# Patient Record
Sex: MALE | Race: WHITE | Employment: OTHER | URBAN - METROPOLITAN AREA
[De-identification: names, ages, dates, MRNs, and addresses within clinical notes are randomized per-mention and may not be internally consistent; named-entity substitution may affect disease eponyms.]

---

## 2023-05-14 ENCOUNTER — APPOINTMENT (OUTPATIENT)
Dept: CT IMAGING | Age: 73
DRG: 068 | End: 2023-05-14
Payer: MEDICARE

## 2023-05-14 ENCOUNTER — HOSPITAL ENCOUNTER (INPATIENT)
Age: 73
LOS: 2 days | Discharge: HOME | DRG: 068 | End: 2023-05-16
Attending: EMERGENCY MEDICINE | Admitting: INTERNAL MEDICINE
Payer: MEDICARE

## 2023-05-14 ENCOUNTER — APPOINTMENT (OUTPATIENT)
Dept: GENERAL RADIOLOGY | Age: 73
DRG: 068 | End: 2023-05-14
Payer: MEDICARE

## 2023-05-14 DIAGNOSIS — I48.91 NEW ONSET ATRIAL FIBRILLATION (HCC): ICD-10-CM

## 2023-05-14 DIAGNOSIS — I63.9 CEREBROVASCULAR ACCIDENT (CVA), UNSPECIFIED MECHANISM (HCC): Primary | ICD-10-CM

## 2023-05-14 DIAGNOSIS — I48.91 ATRIAL FIBRILLATION WITH RVR (HCC): ICD-10-CM

## 2023-05-14 DIAGNOSIS — D69.6 THROMBOCYTOPENIA (HCC): ICD-10-CM

## 2023-05-14 PROBLEM — R03.0 ELEVATED BLOOD PRESSURE READING: Status: ACTIVE | Noted: 2023-05-14

## 2023-05-14 PROBLEM — R47.01 EXPRESSIVE APHASIA: Status: ACTIVE | Noted: 2023-05-14

## 2023-05-14 PROBLEM — R20.0 LEFT ARM NUMBNESS: Status: ACTIVE | Noted: 2023-05-14

## 2023-05-14 PROBLEM — R29.810 FACIAL DROOP: Status: ACTIVE | Noted: 2023-05-14

## 2023-05-14 PROBLEM — R29.90 STROKE-LIKE SYMPTOMS: Status: ACTIVE | Noted: 2023-05-14

## 2023-05-14 LAB
ALBUMIN SERPL-MCNC: 4.5 G/DL (ref 3.4–5)
ALBUMIN/GLOB SERPL: 1.4 {RATIO} (ref 1.1–2.2)
ALP SERPL-CCNC: 238 U/L (ref 40–129)
ALT SERPL-CCNC: 39 U/L (ref 10–40)
ANION GAP SERPL CALCULATED.3IONS-SCNC: 16 MMOL/L (ref 3–16)
AST SERPL-CCNC: 75 U/L (ref 15–37)
BASOPHILS # BLD: 0 K/UL (ref 0–0.2)
BASOPHILS NFR BLD: 0.4 %
BILIRUB SERPL-MCNC: 1.6 MG/DL (ref 0–1)
BUN SERPL-MCNC: 9 MG/DL (ref 7–20)
CALCIUM SERPL-MCNC: 9.2 MG/DL (ref 8.3–10.6)
CHLORIDE SERPL-SCNC: 97 MMOL/L (ref 99–110)
CO2 SERPL-SCNC: 24 MMOL/L (ref 21–32)
CREAT SERPL-MCNC: 0.7 MG/DL (ref 0.8–1.3)
DEPRECATED RDW RBC AUTO: 13.6 % (ref 12.4–15.4)
EOSINOPHIL # BLD: 0.2 K/UL (ref 0–0.6)
EOSINOPHIL NFR BLD: 1.9 %
GFR SERPLBLD CREATININE-BSD FMLA CKD-EPI: >60 ML/MIN/{1.73_M2}
GLUCOSE BLD-MCNC: 100 MG/DL
GLUCOSE BLD-MCNC: 100 MG/DL (ref 70–99)
GLUCOSE SERPL-MCNC: 114 MG/DL (ref 70–99)
HCT VFR BLD AUTO: 49.9 % (ref 40.5–52.5)
HGB BLD-MCNC: 17.2 G/DL (ref 13.5–17.5)
INR PPP: 1.27 (ref 0.84–1.16)
LYMPHOCYTES # BLD: 1.7 K/UL (ref 1–5.1)
LYMPHOCYTES NFR BLD: 18.9 %
MCH RBC QN AUTO: 36.5 PG (ref 26–34)
MCHC RBC AUTO-ENTMCNC: 34.5 G/DL (ref 31–36)
MCV RBC AUTO: 105.7 FL (ref 80–100)
MONOCYTES # BLD: 0.9 K/UL (ref 0–1.3)
MONOCYTES NFR BLD: 10.5 %
NEUTROPHILS # BLD: 6.1 K/UL (ref 1.7–7.7)
NEUTROPHILS NFR BLD: 68.3 %
PERFORMED ON: ABNORMAL
PLATELET # BLD AUTO: 116 K/UL (ref 135–450)
PMV BLD AUTO: 8.1 FL (ref 5–10.5)
POTASSIUM SERPL-SCNC: 3.9 MMOL/L (ref 3.5–5.1)
PROT SERPL-MCNC: 7.7 G/DL (ref 6.4–8.2)
PROTHROMBIN TIME: 15.8 SEC (ref 11.5–14.8)
RBC # BLD AUTO: 4.72 M/UL (ref 4.2–5.9)
SODIUM SERPL-SCNC: 137 MMOL/L (ref 136–145)
TROPONIN, HIGH SENSITIVITY: 13 NG/L (ref 0–22)
WBC # BLD AUTO: 8.9 K/UL (ref 4–11)

## 2023-05-14 PROCEDURE — 6370000000 HC RX 637 (ALT 250 FOR IP): Performed by: INTERNAL MEDICINE

## 2023-05-14 PROCEDURE — 85025 COMPLETE CBC W/AUTO DIFF WBC: CPT

## 2023-05-14 PROCEDURE — 80053 COMPREHEN METABOLIC PANEL: CPT

## 2023-05-14 PROCEDURE — 85610 PROTHROMBIN TIME: CPT

## 2023-05-14 PROCEDURE — 70496 CT ANGIOGRAPHY HEAD: CPT

## 2023-05-14 PROCEDURE — 70450 CT HEAD/BRAIN W/O DYE: CPT

## 2023-05-14 PROCEDURE — 99285 EMERGENCY DEPT VISIT HI MDM: CPT

## 2023-05-14 PROCEDURE — 93005 ELECTROCARDIOGRAM TRACING: CPT | Performed by: EMERGENCY MEDICINE

## 2023-05-14 PROCEDURE — 2060000000 HC ICU INTERMEDIATE R&B

## 2023-05-14 PROCEDURE — 84484 ASSAY OF TROPONIN QUANT: CPT

## 2023-05-14 PROCEDURE — 71045 X-RAY EXAM CHEST 1 VIEW: CPT

## 2023-05-14 PROCEDURE — 6360000004 HC RX CONTRAST MEDICATION: Performed by: EMERGENCY MEDICINE

## 2023-05-14 RX ORDER — ACETAMINOPHEN 325 MG/1
650 TABLET ORAL EVERY 4 HOURS PRN
Status: DISCONTINUED | OUTPATIENT
Start: 2023-05-14 | End: 2023-05-16 | Stop reason: HOSPADM

## 2023-05-14 RX ORDER — DILTIAZEM HYDROCHLORIDE 60 MG/1
60 TABLET, FILM COATED ORAL EVERY 6 HOURS SCHEDULED
Status: DISCONTINUED | OUTPATIENT
Start: 2023-05-14 | End: 2023-05-15

## 2023-05-14 RX ORDER — ATORVASTATIN CALCIUM 80 MG/1
80 TABLET, FILM COATED ORAL NIGHTLY
Status: DISCONTINUED | OUTPATIENT
Start: 2023-05-14 | End: 2023-05-16 | Stop reason: HOSPADM

## 2023-05-14 RX ORDER — ASPIRIN 300 MG/1
300 SUPPOSITORY RECTAL DAILY
Status: DISCONTINUED | OUTPATIENT
Start: 2023-05-15 | End: 2023-05-16 | Stop reason: HOSPADM

## 2023-05-14 RX ORDER — ONDANSETRON 2 MG/ML
4 INJECTION INTRAMUSCULAR; INTRAVENOUS EVERY 6 HOURS PRN
Status: DISCONTINUED | OUTPATIENT
Start: 2023-05-14 | End: 2023-05-16 | Stop reason: HOSPADM

## 2023-05-14 RX ORDER — M-VIT,TX,IRON,MINS/CALC/FOLIC 27MG-0.4MG
1 TABLET ORAL DAILY
COMMUNITY

## 2023-05-14 RX ORDER — POLYETHYLENE GLYCOL 3350 17 G/17G
17 POWDER, FOR SOLUTION ORAL DAILY PRN
Status: DISCONTINUED | OUTPATIENT
Start: 2023-05-14 | End: 2023-05-16 | Stop reason: HOSPADM

## 2023-05-14 RX ORDER — ENOXAPARIN SODIUM 100 MG/ML
40 INJECTION SUBCUTANEOUS DAILY
Status: CANCELLED | OUTPATIENT
Start: 2023-05-14

## 2023-05-14 RX ORDER — ASPIRIN 81 MG/1
81 TABLET ORAL DAILY
Status: DISCONTINUED | OUTPATIENT
Start: 2023-05-15 | End: 2023-05-16 | Stop reason: HOSPADM

## 2023-05-14 RX ORDER — ASPIRIN 81 MG/1
81 TABLET ORAL DAILY
COMMUNITY

## 2023-05-14 RX ORDER — ONDANSETRON 4 MG/1
4 TABLET, ORALLY DISINTEGRATING ORAL EVERY 8 HOURS PRN
Status: DISCONTINUED | OUTPATIENT
Start: 2023-05-14 | End: 2023-05-16 | Stop reason: HOSPADM

## 2023-05-14 RX ORDER — IBUPROFEN 200 MG
200 TABLET ORAL EVERY 6 HOURS PRN
Status: ON HOLD | COMMUNITY
End: 2023-05-15 | Stop reason: HOSPADM

## 2023-05-14 RX ADMIN — APIXABAN 5 MG: 5 TABLET, FILM COATED ORAL at 20:55

## 2023-05-14 RX ADMIN — ATORVASTATIN CALCIUM 80 MG: 80 TABLET, FILM COATED ORAL at 22:25

## 2023-05-14 RX ADMIN — DILTIAZEM HYDROCHLORIDE 60 MG: 60 TABLET, FILM COATED ORAL at 20:38

## 2023-05-14 RX ADMIN — IOPAMIDOL 75 ML: 755 INJECTION, SOLUTION INTRAVENOUS at 18:58

## 2023-05-14 RX ADMIN — DILTIAZEM HYDROCHLORIDE 60 MG: 60 TABLET, FILM COATED ORAL at 22:25

## 2023-05-14 ASSESSMENT — ENCOUNTER SYMPTOMS
RHINORRHEA: 0
SORE THROAT: 0
ABDOMINAL PAIN: 0
EYE REDNESS: 0
SHORTNESS OF BREATH: 0

## 2023-05-14 ASSESSMENT — PAIN SCALES - GENERAL: PAINLEVEL_OUTOF10: 2

## 2023-05-15 ENCOUNTER — TELEPHONE (OUTPATIENT)
Dept: CARDIOLOGY | Age: 73
End: 2023-05-15

## 2023-05-15 ENCOUNTER — APPOINTMENT (OUTPATIENT)
Dept: MRI IMAGING | Age: 73
DRG: 068 | End: 2023-05-15
Payer: MEDICARE

## 2023-05-15 PROBLEM — I63.9 CEREBROVASCULAR ACCIDENT (CVA) (HCC): Status: ACTIVE | Noted: 2023-05-15

## 2023-05-15 LAB
ANION GAP SERPL CALCULATED.3IONS-SCNC: 13 MMOL/L (ref 3–16)
BASOPHILS # BLD: 0 K/UL (ref 0–0.2)
BASOPHILS NFR BLD: 0.5 %
BUN SERPL-MCNC: 10 MG/DL (ref 7–20)
CALCIUM SERPL-MCNC: 8.8 MG/DL (ref 8.3–10.6)
CHLORIDE SERPL-SCNC: 101 MMOL/L (ref 99–110)
CHOLEST SERPL-MCNC: 176 MG/DL (ref 0–199)
CO2 SERPL-SCNC: 24 MMOL/L (ref 21–32)
CREAT SERPL-MCNC: 0.6 MG/DL (ref 0.8–1.3)
DEPRECATED RDW RBC AUTO: 13.6 % (ref 12.4–15.4)
EKG ATRIAL RATE: 170 BPM
EKG DIAGNOSIS: NORMAL
EKG Q-T INTERVAL: 304 MS
EKG QRS DURATION: 88 MS
EKG QTC CALCULATION (BAZETT): 472 MS
EKG R AXIS: 38 DEGREES
EKG T AXIS: 42 DEGREES
EKG VENTRICULAR RATE: 145 BPM
EOSINOPHIL # BLD: 0.1 K/UL (ref 0–0.6)
EOSINOPHIL NFR BLD: 1.9 %
GFR SERPLBLD CREATININE-BSD FMLA CKD-EPI: >60 ML/MIN/{1.73_M2}
GLUCOSE SERPL-MCNC: 117 MG/DL (ref 70–99)
HCT VFR BLD AUTO: 47.4 % (ref 40.5–52.5)
HDLC SERPL-MCNC: 44 MG/DL (ref 40–60)
HGB BLD-MCNC: 16.5 G/DL (ref 13.5–17.5)
LDLC SERPL CALC-MCNC: 90 MG/DL
LV EF: 58 %
LVEF MODALITY: NORMAL
LYMPHOCYTES # BLD: 1.1 K/UL (ref 1–5.1)
LYMPHOCYTES NFR BLD: 14.3 %
MAGNESIUM SERPL-MCNC: 1.9 MG/DL (ref 1.8–2.4)
MCH RBC QN AUTO: 36.5 PG (ref 26–34)
MCHC RBC AUTO-ENTMCNC: 34.7 G/DL (ref 31–36)
MCV RBC AUTO: 105.1 FL (ref 80–100)
MONOCYTES # BLD: 0.8 K/UL (ref 0–1.3)
MONOCYTES NFR BLD: 10.7 %
NEUTROPHILS # BLD: 5.4 K/UL (ref 1.7–7.7)
NEUTROPHILS NFR BLD: 72.6 %
PLATELET # BLD AUTO: 104 K/UL (ref 135–450)
PMV BLD AUTO: 8.7 FL (ref 5–10.5)
POTASSIUM SERPL-SCNC: 3.5 MMOL/L (ref 3.5–5.1)
RBC # BLD AUTO: 4.52 M/UL (ref 4.2–5.9)
SODIUM SERPL-SCNC: 138 MMOL/L (ref 136–145)
T4 FREE SERPL-MCNC: 1.4 NG/DL (ref 0.9–1.8)
TRIGL SERPL-MCNC: 211 MG/DL (ref 0–150)
TSH SERPL DL<=0.005 MIU/L-ACNC: 3.82 UIU/ML (ref 0.27–4.2)
VLDLC SERPL CALC-MCNC: 42 MG/DL
WBC # BLD AUTO: 7.4 K/UL (ref 4–11)

## 2023-05-15 PROCEDURE — 80048 BASIC METABOLIC PNL TOTAL CA: CPT

## 2023-05-15 PROCEDURE — 80061 LIPID PANEL: CPT

## 2023-05-15 PROCEDURE — 1200000000 HC SEMI PRIVATE

## 2023-05-15 PROCEDURE — 84443 ASSAY THYROID STIM HORMONE: CPT

## 2023-05-15 PROCEDURE — 6370000000 HC RX 637 (ALT 250 FOR IP): Performed by: INTERNAL MEDICINE

## 2023-05-15 PROCEDURE — 85025 COMPLETE CBC W/AUTO DIFF WBC: CPT

## 2023-05-15 PROCEDURE — 99223 1ST HOSP IP/OBS HIGH 75: CPT | Performed by: NURSE PRACTITIONER

## 2023-05-15 PROCEDURE — 70551 MRI BRAIN STEM W/O DYE: CPT

## 2023-05-15 PROCEDURE — 6360000002 HC RX W HCPCS

## 2023-05-15 PROCEDURE — 93010 ELECTROCARDIOGRAM REPORT: CPT | Performed by: INTERNAL MEDICINE

## 2023-05-15 PROCEDURE — 84439 ASSAY OF FREE THYROXINE: CPT

## 2023-05-15 PROCEDURE — 97165 OT EVAL LOW COMPLEX 30 MIN: CPT

## 2023-05-15 PROCEDURE — 93306 TTE W/DOPPLER COMPLETE: CPT

## 2023-05-15 PROCEDURE — 83735 ASSAY OF MAGNESIUM: CPT

## 2023-05-15 PROCEDURE — 83036 HEMOGLOBIN GLYCOSYLATED A1C: CPT

## 2023-05-15 PROCEDURE — 97535 SELF CARE MNGMENT TRAINING: CPT

## 2023-05-15 PROCEDURE — 36415 COLL VENOUS BLD VENIPUNCTURE: CPT

## 2023-05-15 PROCEDURE — 6370000000 HC RX 637 (ALT 250 FOR IP)

## 2023-05-15 PROCEDURE — 97116 GAIT TRAINING THERAPY: CPT

## 2023-05-15 PROCEDURE — 97161 PT EVAL LOW COMPLEX 20 MIN: CPT

## 2023-05-15 RX ORDER — ATORVASTATIN CALCIUM 80 MG/1
80 TABLET, FILM COATED ORAL NIGHTLY
Qty: 30 TABLET | Refills: 0 | Status: SHIPPED | OUTPATIENT
Start: 2023-05-15

## 2023-05-15 RX ORDER — DILTIAZEM HYDROCHLORIDE 180 MG/1
180 CAPSULE, COATED, EXTENDED RELEASE ORAL DAILY
Qty: 30 CAPSULE | Refills: 1 | Status: SHIPPED | OUTPATIENT
Start: 2023-05-15

## 2023-05-15 RX ORDER — DILTIAZEM HYDROCHLORIDE 180 MG/1
180 CAPSULE, COATED, EXTENDED RELEASE ORAL DAILY
Status: DISCONTINUED | OUTPATIENT
Start: 2023-05-15 | End: 2023-05-16 | Stop reason: HOSPADM

## 2023-05-15 RX ORDER — LORAZEPAM 2 MG/ML
0.5 INJECTION INTRAMUSCULAR ONCE
Status: COMPLETED | OUTPATIENT
Start: 2023-05-15 | End: 2023-05-15

## 2023-05-15 RX ADMIN — LORAZEPAM 0.5 MG: 2 INJECTION INTRAMUSCULAR; INTRAVENOUS at 09:05

## 2023-05-15 RX ADMIN — APIXABAN 5 MG: 5 TABLET, FILM COATED ORAL at 08:03

## 2023-05-15 RX ADMIN — DILTIAZEM HYDROCHLORIDE 60 MG: 60 TABLET, FILM COATED ORAL at 05:25

## 2023-05-15 RX ADMIN — ATORVASTATIN CALCIUM 80 MG: 80 TABLET, FILM COATED ORAL at 20:43

## 2023-05-15 RX ADMIN — DILTIAZEM HYDROCHLORIDE 180 MG: 180 CAPSULE, COATED, EXTENDED RELEASE ORAL at 13:57

## 2023-05-15 RX ADMIN — RIVAROXABAN 20 MG: 20 TABLET, FILM COATED ORAL at 21:43

## 2023-05-15 RX ADMIN — ASPIRIN 81 MG: 81 TABLET, COATED ORAL at 08:03

## 2023-05-15 ASSESSMENT — PAIN SCALES - GENERAL: PAINLEVEL_OUTOF10: 0

## 2023-05-15 NOTE — CARE COORDINATION
Case Management Assessment  Initial Evaluation    Date/Time of Evaluation: 5/15/2023 2:06 PM  Assessment Completed by: SUSHANT Arredondo    If patient is discharged prior to next notation, then this note serves as note for discharge by case management. Patient Name: Chito Bellamy                   YOB: 1950  Diagnosis: Thrombocytopenia ([facility] [address] [D69.6]  Atrial fibrillation with rapid ventricular response (HCC) [I48.91]  Atrial fibrillation with RVR ([facility] [address] [I48.91]  Cerebrovascular accident (CVA), unspecified mechanism ([facility] [address] [I63.9]                   Date / Time: 5/14/2023  6:23 PM    Patient Admission Status: Inpatient   Readmission Risk (Low < 19, Mod (19-27), High > 27): Readmission Risk Score: 7.8    Current PCP: Becky Yang MD  PCP verified by CM? (P) Yes    Chart Reviewed: Yes      History Provided by: (P) Patient, Spouse  Patient Orientation: Alert and Oriented    Patient Cognition: Alert    Hospitalization in the last 30 days (Readmission):  No    If yes, Readmission Assessment in CM Navigator will be completed.     Advance Directives:      Code Status: Full Code   Patient's Primary Decision Maker is: Legal Next of Kin    [facility] - [phone]    Discharge Planning:    Patient lives with: Spouse/Significant Other Type of Home: House  Primary Care Giver: (P) Self  Patient Support Systems include: Spouse/Significant Other, Children, Family Members   Current Financial resources: (P) None  Current community resources: (P) None  Current services prior to admission: None            Current DME:              Type of Home Care services:  None    ADLS  Prior functional level: (P) Independent in ADLs/IADLs  Current functional level: (P) Independent in ADLs/IADLs    PT AM-PAC:   /24  OT AM-PAC:   /24    Family can provide assistance at DC: (P) Yes  Would you like Case Management to discuss the discharge plan with any other family members/significant

## 2023-05-15 NOTE — TELEPHONE ENCOUNTER
Monitor company [employer]  Length of monitor 14 days  Monitor ordered by Yuliet Hanson MD   Patch ID [id number]  Device number [id number]  Monitor given to Demetrius Paige RN. Nurse to apply at the time of discharge.

## 2023-05-15 NOTE — DISCHARGE INSTRUCTIONS
FOLLOW-UP APPOINTMENTS    [facility]ICE - Appointment on August 22nd at 9:45am with Anders Golden MD, electrophysiologist, [facility]. [facility],  [facility], [address] [address], [address] [facility], [address] [place] [address]. Office #: [phone]. If you are unable to make this appointment, please call to reschedule. Directions to [place] [address] towards [place]. [address] exit. Right off exit. Cross over [employer]. Right on [address] Left into hospital. Follow the signs to the emergency room ( turn left toward the Emergency room). Go right at the first stop sign. Just past the Emergency room at the second stop sign turn right and go up the ramp and park on the top level if possible. Go in the glass doors of the [facility] we on the top level of the garage [address]. As soon as you get in the door turn left and our office is the one with the glass doors. [employer] MONITOR PATCH PATIENT INSTRUCTIONS    Remove Patch in 1 week from application. Recalibrate and apply next patch. Call  [employer] CUSTOMER SUPPORT: [phone] and they will assist if needed. o PIN: [id number]    o Keep the phone with you as much as possible. Be sure to charge the phone overnight and when the battery gets low. If the phone dies completely, be sure to restart the phone and enter the PIN number to confirm the patch is connected to your phone.    o If you are away from the phone for more than 8 hours, please stay within a 30 foot range of the phone for 3 hours to ensure your data fully uploads. o If you go somewhere with no cellphone service, still keep the phone with you and charged. Your data will upload when you reach cellphone service. You can stay outside of cellphone range for up to 10 days. o This phone cannot make calls, take pictures, etc. It is solely for medical use and will only Bluetooth connect to your patch.     · Patch Instructions:    o Wait to exercise or shower for 30

## 2023-05-15 NOTE — H&P
Hospital Medicine  History and Physical    PCP: Michael Aldridge MD  Patient Name: Mariela Ferrara    Date of Service: Pt seen/examined on 5/14/23 and admitted to Inpatient with expected LOS greater than two midnights due to medical therapy    CHIEF COMPLAINT:  Pt c/o an acute onset of numbness to the left arm, left facial droop and abnormal speech which began approximately 1 hour before his arrival in the emergency room. HISTORY OF PRESENT ILLNESS: Pt is an otherwise healthy 67y.o. year-old male who takes no chronic medications. He presents for evaluation of numbness to the left arm, left facial droop and abnormal speech which began approximately 1 hour before his arrival in the emergency room. He was quickly taken to the CT scanner on arrival, and when he returned back to the ER his symptoms had resolved. At the time that I saw him he was symptom-free. Pt denies fall or head trauma, and associated signs and symptoms do not include neck pain or stiffness, diplopia or other vision changes, difficulty swallowing or focal neurological symptoms not mentioned above. He is being admitted for further evaluation and treatment. Past Medical History:    No past medical history on file. Past Surgical History:    No past surgical history on file. Allergies:  Patient has no known allergies. Medications Prior to Admission:    None    Family History:   Family history is negative for accelerated coronary artery disease, diabetes or malignancies. Social History:   TOBACCO:   has no history on file for tobacco use. ETOH:   has no history on file for alcohol use. OCCUPATION:      REVIEW OF SYSTEMS:  A full review of systems was performed and is negative except for that which appears in the HPI    Physical Exam:    Vitals: BP (!) 182/120   Pulse (!) 110   Temp 98.2 °F (36.8 °C)   Resp 17   Ht 5' 11\" (1.803 m)   Wt 215 lb (97.5 kg)   SpO2 95%   BMI 29.99 kg/m²   General appearance: WD/WN 67 y.o.

## 2023-05-15 NOTE — PLAN OF CARE
Problem: Discharge Planning  Goal: Discharge to home or other facility with appropriate resources  Recent Flowsheet Documentation  Taken 5/14/2023 2123 by Denys Mcardle, RN  Discharge to home or other facility with appropriate resources:   Identify barriers to discharge with patient and caregiver   Arrange for needed discharge resources and transportation as appropriate   Identify discharge learning needs (meds, wound care, etc)   Refer to discharge planning if patient needs post-hospital services based on physician order or complex needs related to functional status, cognitive ability or social support system     Problem: Neurosensory - Adult  Goal: Achieves stable or improved neurological status  Outcome: Progressing  Flowsheets (Taken 5/15/2023 0007)  Achieves stable or improved neurological status:   Assess for and report changes in neurological status   Initiate measures to prevent increased intracranial pressure   Maintain blood pressure and fluid volume within ordered parameters to optimize cerebral perfusion and minimize risk of hemorrhage   Monitor temperature, glucose, and sodium.  Initiate appropriate interventions as ordered

## 2023-05-16 VITALS
RESPIRATION RATE: 18 BRPM | TEMPERATURE: 98.1 F | DIASTOLIC BLOOD PRESSURE: 75 MMHG | OXYGEN SATURATION: 95 % | HEART RATE: 83 BPM | BODY MASS INDEX: 29.75 KG/M2 | HEIGHT: 71 IN | SYSTOLIC BLOOD PRESSURE: 116 MMHG | WEIGHT: 212.5 LBS

## 2023-05-16 PROBLEM — I10 PRIMARY HYPERTENSION: Status: ACTIVE | Noted: 2023-05-16

## 2023-05-16 LAB
ANION GAP SERPL CALCULATED.3IONS-SCNC: 10 MMOL/L (ref 3–16)
BASOPHILS # BLD: 0 K/UL (ref 0–0.2)
BASOPHILS NFR BLD: 0.4 %
BUN SERPL-MCNC: 8 MG/DL (ref 7–20)
CALCIUM SERPL-MCNC: 8.7 MG/DL (ref 8.3–10.6)
CHLORIDE SERPL-SCNC: 106 MMOL/L (ref 99–110)
CO2 SERPL-SCNC: 24 MMOL/L (ref 21–32)
CREAT SERPL-MCNC: 0.6 MG/DL (ref 0.8–1.3)
DEPRECATED RDW RBC AUTO: 13.8 % (ref 12.4–15.4)
EOSINOPHIL # BLD: 0.3 K/UL (ref 0–0.6)
EOSINOPHIL NFR BLD: 3.4 %
EST. AVERAGE GLUCOSE BLD GHB EST-MCNC: 91.1 MG/DL
GFR SERPLBLD CREATININE-BSD FMLA CKD-EPI: >60 ML/MIN/{1.73_M2}
GLUCOSE SERPL-MCNC: 124 MG/DL (ref 70–99)
HBA1C MFR BLD: 4.8 %
HCT VFR BLD AUTO: 48.1 % (ref 40.5–52.5)
HGB BLD-MCNC: 16.6 G/DL (ref 13.5–17.5)
LYMPHOCYTES # BLD: 1.2 K/UL (ref 1–5.1)
LYMPHOCYTES NFR BLD: 15.6 %
MAGNESIUM SERPL-MCNC: 1.7 MG/DL (ref 1.8–2.4)
MCH RBC QN AUTO: 36.5 PG (ref 26–34)
MCHC RBC AUTO-ENTMCNC: 34.6 G/DL (ref 31–36)
MCV RBC AUTO: 105.6 FL (ref 80–100)
MONOCYTES # BLD: 0.7 K/UL (ref 0–1.3)
MONOCYTES NFR BLD: 10 %
NEUTROPHILS # BLD: 5.3 K/UL (ref 1.7–7.7)
NEUTROPHILS NFR BLD: 70.6 %
PLATELET # BLD AUTO: 89 K/UL (ref 135–450)
PLATELET BLD QL SMEAR: ABNORMAL
PMV BLD AUTO: 8.3 FL (ref 5–10.5)
POTASSIUM SERPL-SCNC: 3.3 MMOL/L (ref 3.5–5.1)
RBC # BLD AUTO: 4.55 M/UL (ref 4.2–5.9)
SLIDE REVIEW: ABNORMAL
SODIUM SERPL-SCNC: 140 MMOL/L (ref 136–145)
WBC # BLD AUTO: 7.5 K/UL (ref 4–11)

## 2023-05-16 PROCEDURE — 80048 BASIC METABOLIC PNL TOTAL CA: CPT

## 2023-05-16 PROCEDURE — 6370000000 HC RX 637 (ALT 250 FOR IP)

## 2023-05-16 PROCEDURE — 6370000000 HC RX 637 (ALT 250 FOR IP): Performed by: INTERNAL MEDICINE

## 2023-05-16 PROCEDURE — 36415 COLL VENOUS BLD VENIPUNCTURE: CPT

## 2023-05-16 PROCEDURE — 83735 ASSAY OF MAGNESIUM: CPT

## 2023-05-16 PROCEDURE — 85025 COMPLETE CBC W/AUTO DIFF WBC: CPT

## 2023-05-16 RX ORDER — POTASSIUM CHLORIDE 20 MEQ/1
40 TABLET, EXTENDED RELEASE ORAL ONCE
Status: COMPLETED | OUTPATIENT
Start: 2023-05-16 | End: 2023-05-16

## 2023-05-16 RX ADMIN — ASPIRIN 81 MG: 81 TABLET, COATED ORAL at 09:54

## 2023-05-16 RX ADMIN — POTASSIUM CHLORIDE 40 MEQ: 1500 TABLET, EXTENDED RELEASE ORAL at 09:54

## 2023-05-16 RX ADMIN — DILTIAZEM HYDROCHLORIDE 180 MG: 180 CAPSULE, COATED, EXTENDED RELEASE ORAL at 09:54

## 2023-05-16 NOTE — CONSULTS
Consult Placed     Who: Neuro- Dr. Ra Graves  Date: 5/15/23  Time: 0355     Electronically signed by Marko Bright RN on 5/15/2023 at 7:31 AM
Electrophysiology Consultation   Date: 5/15/2023  Admit Date:  5/14/2023  Reason for Consultation: New onset atrial fibrillation. Consult Requesting Physician: Lera Cowden, MD     Chief Complaint   Patient presents with    Aphasia    Numbness    Facial Droop     Patient reports sudden onset of L arm tingling, L facial droop, and slurred speech around 1730,      HPI  Mr. Yaya Perdomo is a pleasant 67year old male with a no significant medical history who presents from home with stroke like symptoms and was found to be in atrial fibrillation with RVR. According to patient he was in his usual state of health until yesterday, while watch the [employer] game when his began to have some strange movement in his left arm followed by left sided facial drooping. He presented to [facility] for further evaluation and care. Patient is a social drinker. He denies illicit drug use. He denies tobacco use. He is  with two adult daughters. He is a grandfather 8 times over. He appears to be an anxious person. Patient denies fevers, chest pain, orthopnea, PND, lower extremity edema, abdominal swelling, shortness of breath, dyspnea on exertion, chills, visual changes, headaches, sore throat, cough, abdominal pain, nausea, vomiting, bleeding, bruising, dysuria, muscle/joint pain, confusion, depression, anxiety, skin lesions, etc.    Emergency Room/Hospital Course:  Patient presented to [facility] on 05/14/2023. His CMP was notable for elevated AST and alk phos. His CBC was notable for macrocytosis. His EKG showed atrial fibrillation with RVR. His head CTA showed critical stenosis of proximal left cervical ICA. Neurology and Electrophysiology was consulted.     Current rhythm: Normal sinus rhythm  Known history of atrial fibrillation: no  Valvular disease: unknown  Associated symptoms:  unknown  Heart rate is  controlled  Previous cardioversion and/or ablation: no  History of CAD: No  History of sleep
bilaterally  III,IV,VI: Extra Ocular Movements are intact. No nystagmus  V: Facial sensation is intact  VII: Facial strength and movements: intact and symmetric  IX: Normal palatal elevation and shoulder shrug  XII: Tongue movements are normal  Musculoskeletal: 5/5 in all 4 extremities. Good range of motion. No muscle atrophy. Tone: Normal tone. Reflexes: Symmetric 2+ in the arms and 2+ in the legs   Planters: Flexor bilaterally  Coordination: no pronator drift, no dysmetria with FNF and normal REM  Sensation: normal in both arms and legs. Gait/Posture: steady gait with normal posturing and station. Medical decision making:  Data: reviewed   LABS:   Lab Results   Component Value Date/Time     05/15/2023 06:22 AM    K 3.5 05/15/2023 06:22 AM     05/15/2023 06:22 AM    CO2 24 05/15/2023 06:22 AM    BUN 10 05/15/2023 06:22 AM    CREATININE 0.6 05/15/2023 06:22 AM    LABGLOM >60 05/15/2023 06:22 AM    GLUCOSE 117 05/15/2023 06:22 AM    MG 1.90 05/15/2023 06:22 AM    CALCIUM 8.8 05/15/2023 06:22 AM     Lab Results   Component Value Date/Time    WBC 7.4 05/15/2023 06:22 AM    RBC 4.52 05/15/2023 06:22 AM    HGB 16.5 05/15/2023 06:22 AM    HCT 47.4 05/15/2023 06:22 AM    .1 05/15/2023 06:22 AM    RDW 13.6 05/15/2023 06:22 AM     05/15/2023 06:22 AM     Lab Results   Component Value Date    INR 1.27 (H) 05/14/2023    PROTIME 15.8 (H) 05/14/2023       Neuroimaging was independently reviewed by myself and discussed results with the patient  Reviewed notes from different physicians including H&P and ED notes. Reviewed lab and blood testing      Impression:     Acute dysarthria, left facial droop, and left arm weakness - likely due to punctate right parietal lobe infarct, thromboembolic vs cardioembolic. Critical stenosis left ICA, asymptomatic  New onset A. Fib  HTN, uncontrolled. Recommendation:     ECHO pending. Monitor on tele.     Ok for anticoagulation from a neurological
otherwise office to contact for planning. Time with pt in all aspects of consult 60 mins.     Moy Diaz

## 2023-05-16 NOTE — CARE COORDINATION
CASE MANAGEMENT DISCHARGE SUMMARY      Discharge to: home with wife, patient denies needs, wife asking about FMLA. Wife to have paperwork faxed to CM to complete. Also notified wife she can contact patient's PCP for assistance. Precertification completed: [facility] Exemption Notification (HENS) completed: NA    IMM given: NA    New Durable Medical Equipment ordered/agency: NA    Transportation:    Family/car: wife     Confirmed discharge plan with:     Patient: yes     Family:  yes          RN, name: Vera Shanna    Note: Discharging nurse to complete CHAUNCEY, reconcile AVS, and place final copy with patient's discharge packet. RN to ensure that written prescriptions for  Level II medications are sent with patient to the facility as per protocol.

## 2023-05-16 NOTE — DISCHARGE SUMMARY
Hospital Medicine Discharge Summary    Patient: Jakub Hansen   : 1950     Admit Date: 2023   Discharge Date: 5/15/23  Disposition:  [x]Home   Code status:  [x]Full  Condition at Discharge: Stable  Primary Care Provider: Heath Cano MD    Admitting Physician: Isidoro Mccann MD  Discharge Physician: Bev Espinosa, APRN - CNP     Discharge Diagnoses: Active Hospital Problems    Diagnosis     Atrial fibrillation with rapid ventricular response (HCC) [I48.91]     Stroke-like symptoms [R29.90]     Left arm numbness [R20.0]     Facial droop (left) [R29.810]     Expressive aphasia [R47.01]     Elevated blood pressure reading [R03.0]     New onset atrial fibrillation (HCC) [I48.91]        Presenting Admission History:  67y.o. year-old male who takes no chronic medications. He presents for evaluation of numbness to the left arm, left facial droop and abnormal speech which began approximately 1 hour before his arrival in the emergency room. He was quickly taken to the CT scanner on arrival, and when he returned back to the ER his symptoms had resolved. At the time that I saw him he was symptom-free. Pt denies fall or head trauma, and associated signs and symptoms do not include neck pain or stiffness, diplopia or other vision changes, difficulty swallowing or focal neurological symptoms not mentioned above. He is being admitted for further evaluation and treatment. Assessment/Plan:      Stroke like symptoms. CT, no acute intracranial abnormality. CTA head/neck: no LVO; critical stenosis of L cervical ICA. Recommend outpatient workup with vascular surgery. MRI brain: moderate, chronic microvascular disease. Echo this admission: EF 55-60%; no regional WMAs; G1 DD, normal filling pressure; no mention of shunt w/ bubble study. Continue ASA, statin. Lipid panel and A1C in process. Continue ASA, statin, anticoagulation     Cervical ICA stenosis. Severe, per CTA head/neck.  Vascular
discharge. Continue ASA, statin, anticoagulation     Cervical ICA stenosis. Severe, per CTA head/neck. Vascular surgery was consulted. Recommend to continue medical management with  ASA, statin; and arrange for elective left CEA with either Dr. Maldonado Hernandez or Dr. Alize Love. Information for vascular surgery placed on discharge papers     Atrial fibrillation. New onset. Presented with RVR, rate 160s, rate is currently controlled. TSH 3.82, free T4 1.4 this admission. Xarelto was okayed per neuro. Continue diltiazem, xarelto. Cardiac monitor on discharge. Recommend outpatient sleep study, information for pulm placed on discharge papers    Physical Exam Performed:      /66   Pulse 90   Temp 98.3 °F (36.8 °C) (Oral)   Resp 16   Ht 5' 11\" (1.803 m)   Wt 212 lb 8 oz (96.4 kg)   SpO2 97%   BMI 29.64 kg/m²     General appearance: No apparent distress, appears stated age and cooperative. Respiratory: Normal respiratory effort. Room air  Cardiovascular: Regular rate and rhythm. Abdomen: Soft, non-tender, non-distended. Musculoskelatal: No edema  Neurologic: Non-focal  Psychiatric: Alert and oriented    Patient Discharge Instructions: Follow up:    1. Primary Care Provider Joseph Lenz MD in the next 1-2 weeks. 2.  Call vascular surgery to arrange for elective left CEA    The patient was seen and examined on day of discharge and this discharge summary is in conjunction with any daily progress note from day of discharge.  Time spent on discharge: 31 minutes in the examination, evaluation, counseling and review of medications and discharge plan.    ------------------------------------------------------------------------------------------------------------------------------------------------------    Discharge Medications:   Current Discharge Medication List        START taking these medications    Details   rivaroxaban (XARELTO) 20 MG TABS tablet Take 1 tablet by mouth Daily with supper  Qty: 30 tablet,

## 2023-05-17 ENCOUNTER — TELEPHONE (OUTPATIENT)
Dept: CARDIOLOGY CLINIC | Age: 73
End: 2023-05-17

## 2023-05-17 NOTE — PROGRESS NOTES
CMU to RN, pt's rhythm is unreadable on tele. Telemetry box and leadset changed. Pt's rhythm is clear and readable with new box #4.
Occupational Therapy  Facility/Department: [place] [address] REMOTE TELEMETRY  Occupational Therapy Initial Assessment and Treatment Note 1x    Name: Whitney Skinner  : 1950  MRN: [id number]  Date of Service: 5/15/2023    Discharge Recommendations:  Home with assist PRN      Patient Diagnosis(es): The primary encounter diagnosis was Cerebrovascular accident (CVA), unspecified mechanism ([facility] [address] Diagnoses of Thrombocytopenia ([facility] Atrial fibrillation with RVR ([facility] and New onset atrial fibrillation [facility]) were also pertinent to this visit. Past Medical History:  has no past medical history on file. Past Surgical History:  has no past surgical history on file. Assessment   Assessment: OT eval and tx completed. Pt admitted for afib, L side weakness and speech deficit. He reports return to baseline. During OT session, pt demo'd independence for self-care tasks, standing balance and transfers. Slight L shoulder weakness noted in ant and middle deltoid but strength is functional. Pt also reports hx of cervical issues. Provided education on home exer program for UE. No further OT needed. Decision Making: Medium Complexity  REQUIRES OT FOLLOW-UP: No  Activity Tolerance  Activity Tolerance: Patient Tolerated treatment well        Restrictions  Restrictions/Precautions  Restrictions/Precautions: Up as Tolerated    Subjective   General  Chart Reviewed: Yes  Patient assessed for rehabilitation services?: Yes  Additional Pertinent Hx: Admitted d/t L side weakness and speech deficit  Family / Caregiver Present: Yes (wife)  Referring Practitioner: BROOKE Porter  Diagnosis: new onset afib  Subjective  Subjective: Pt agreeable to OT.  Denies pain  General Comment  Comments: RN approved therapy  No pain  Social/Functional History  Social/Functional History  Lives With: Spouse  Type of Home: House  Home Layout: Two level, Bed/Bath upstairs, 1/2 bath on main level  Home Access: Stairs to enter without rails  Entrance Stairs - Number of
Patient admitted to room 212 from ED. Patient oriented to room, call light, bed rails, phone, lights and bathroom. Patient instructed about the schedule of the day including: vital sign frequency, lab draws, possible tests, frequency of MD and staff rounds, including RN/MD rounding together at bedside, daily weights, and I &O's. Patient instructed about prescribed diet and telemetry. Portable Telemetry box 91 in place, patient aware of placement and reason. Bed locked, in lowest position, side rails up 2/4, call light within reach. Will continue to monitor.
Physician Progress Note      Angelic Bond  [facility] #:                  [id number]  :                       1950  ADMIT DATE:       2023 6:23 PM  [address] DATE:        2023 2:32 PM  RESPONDING  PROVIDER #:        Yocasta Lennon          QUERY TEXT:    Pt admitted with \"Stroke like symptoms-Acute dysarthria, left facial droop,   and left arm weakness/New afib. \"  Neurology noted TIA. If possible, please   document in progress notes and discharge summary if you are evaluating and /or   treating any of the following: The medical record reflects the following:  Risk Factors: new afib HTN  Clinical Indicators: Neurology- \" This was likely a TIA with evidence of very   tiny single foci of ischemia on DWI and corresponding hypodensity on ADC map   in the right side- likely due to punctate right parietal lobe infarct,   thromboembolic vs cardioembolic. Critical stenosis left ICA, asymptomatic. Incidental left sided ICA stenosis   which needs to be addressed latera as outpatient. \"  Treatment: Vascular and neurology consult, ECHO pending. Monitor on tele. [id number] [address]   for anticoagulation from a neurological standpoint. Needs improved BP   management. F/u A1c, lipid panel. PT/OT/SLP, Agree with outpatient sleep   study. Vascular consult for critical ICA stenosis, left. CT, MRI, Oral   anticoagulation for new onset Afib.     Thank-You, Lilo Villa RN, BSN, CCDS  Options provided:  -- Acute dysarthria, left facial droop, and left arm weakness likely due to   TIA (unspecified Cerebral Artery or Precerebral/Carotid Artery stenosis or   occlusion) without infarction  -- Other - I will add my own diagnosis  -- Disagree - Not applicable / Not valid  -- Disagree - Clinically unable to determine / Unknown  -- Refer to Clinical Documentation Reviewer    PROVIDER RESPONSE TEXT:    This patient had Acute dysarthria, left facial droop, and left arm weakness   likely due to TIA (unspecified Cerebral Artery or
Pt ok for discharge per MD. Discharge instructions given. IV removed. Telemetry monitor removed and CMU notified. No questions or concerns at this time. Transported by wheelchair to personal car with all belongings.
Pt provided with stroke education book at this time.
Pt transferred from A2 212 to A1 106 with all belongings. Wife Willy Mccray at bedside. CMU aware. Report to [employer].
(heel-shin and rapid alternating movement BLE)  Tone: Normal  Sensation: Intact (denies numbness and tingling)     Bed Mobility Training  Bed Mobility Training: Yes  Supine to Sit: Independent  Sit to Supine: Independent    Balance  Sitting: Intact  Standing: Intact  Transfer Training  Transfer Training: Yes  Sit to Stand: Independent  Stand to Sit: Independent (no AD)    Gait Training  Gait Training: Yes  Gait  Overall Level of Assistance: Independent  Interventions: Verbal cues; Safety awareness training  Speed/Nikko: Accelerated  Gait Abnormalities:  (no gait abnormalities noted during eval; pt able to turn head in all directions to command without LOB or path deviation or nikko change)  Distance (ft): 200 Feet  Assistive Device: Gait belt    Stairs  Rail Use: Left  Stairs - Level of Assistance: Supervision  Number of Stairs Trained: 13 (reciprocal pattern; good balance/steadiness throughout)    AM-PAC Score  AM-PAC Inpatient Mobility Raw Score : 24 (05/15/23 1414)  -PAC Inpatient T-Scale Score : 61.14 (05/15/23 1414)  Mobility Inpatient CMS 0-100% Score: 0 (05/15/23 1414)  Mobility Inpatient CMS G-Code Modifier : [address] (05/15/23 1414)      Goals  Short Term Goals  Time Frame for Short Term Goals: 1 session  Short Term Goal 1: Pt will ambulate 150 ft with no AD and IND--5/15 Goal Met  Short Term Goal 2: Pt will ascend descend a full flight of steps with supervision--5/15 Goal Met  Patient Goals   Patient Goals : \"to go home\"    Education  Patient Education  Education Given To: Patient; Family  Education Provided: Role of Therapy;Plan of Care  Education Method: Verbal  Barriers to Learning: None  Education Outcome: Verbalized understanding;Demonstrated understanding  Disease Specific Education: Pt educated on signs and symptoms of CVA and need to seek immediate medical attention should they occur.  Pt verbalized understanding    Therapy Time   Individual Concurrent Group Co-treatment   Time In 1817         Time
05/16/2023 06:57 AM     05/16/2023 06:57 AM    CO2 24 05/16/2023 06:57 AM     CBC:    Lab Results   Component Value Date/Time    WBC 7.5 05/16/2023 06:57 AM    RBC 4.55 05/16/2023 06:57 AM    HGB 16.6 05/16/2023 06:57 AM    HCT 48.1 05/16/2023 06:57 AM    .6 05/16/2023 06:57 AM    RDW 13.8 05/16/2023 06:57 AM    PLT 89 05/16/2023 06:57 AM     BNP:  No results found for: BNP  Fasting Lipid Panel:    Lab Results   Component Value Date/Time    CHOL 176 05/15/2023 06:22 AM    HDL 44 05/15/2023 06:22 AM    TRIG 211 05/15/2023 06:22 AM     Cardiac Enzymes:  CK/MbTroponinNo results found for: CKTOTAL, CKMB, CKMBINDEX, TROPONINI  PT/ INR   Lab Results   Component Value Date/Time    INR 1.27 05/14/2023 06:30 PM    PROTIME 15.8 05/14/2023 06:30 PM     PTT No results found for: PTT   Lab Results   Component Value Date/Time    MG 1.70 05/16/2023 06:57 AM      Lab Results   Component Value Date/Time    TSH 3.82 05/15/2023 06:22 AM       Assessment:  Atrial fibrillation of unknown duration: ongoing, asymptomatic    -[id number] score 5 (age, HTN, CVA, vascular disease)  Acute right punctate parietal lobe infarct: neurology following   -thromboembolic vs cardio embolic   HTN: suboptimal   Critical left carotid artery stenosis: vascular plans for outpatient endarterectomy   Alcohol dependency   -reports on average 6-10 beers per night     Plan:   1. Continue Xarelto and Cardizem   2. Monitor BP at home and call if consistently out of goal ranges  3. Two week event monitor at discharge   4. Outpatient evaluation for sleep apnea  5. Reduce alcohol intake   6. Office to arrange for office follow up   7.  Okay for discharge       SMILEY Quintana-CRISTHIAN  [address]  [phone]

## 2023-05-19 ENCOUNTER — OFFICE VISIT (OUTPATIENT)
Dept: VASCULAR SURGERY | Age: 73
End: 2023-05-19
Payer: MEDICARE

## 2023-05-19 VITALS
HEIGHT: 71 IN | SYSTOLIC BLOOD PRESSURE: 160 MMHG | DIASTOLIC BLOOD PRESSURE: 62 MMHG | WEIGHT: 212 LBS | BODY MASS INDEX: 29.68 KG/M2

## 2023-05-19 DIAGNOSIS — I65.22 LEFT CAROTID STENOSIS: Primary | ICD-10-CM

## 2023-05-19 PROCEDURE — 3077F SYST BP >= 140 MM HG: CPT | Performed by: SURGERY

## 2023-05-19 PROCEDURE — G8427 DOCREV CUR MEDS BY ELIG CLIN: HCPCS | Performed by: SURGERY

## 2023-05-19 PROCEDURE — 3017F COLORECTAL CA SCREEN DOC REV: CPT | Performed by: SURGERY

## 2023-05-19 PROCEDURE — 1036F TOBACCO NON-USER: CPT | Performed by: SURGERY

## 2023-05-19 PROCEDURE — 1111F DSCHRG MED/CURRENT MED MERGE: CPT | Performed by: SURGERY

## 2023-05-19 PROCEDURE — G8419 CALC BMI OUT NRM PARAM NOF/U: HCPCS | Performed by: SURGERY

## 2023-05-19 PROCEDURE — 3078F DIAST BP <80 MM HG: CPT | Performed by: SURGERY

## 2023-05-19 PROCEDURE — 99214 OFFICE O/P EST MOD 30 MIN: CPT | Performed by: SURGERY

## 2023-05-19 PROCEDURE — 1123F ACP DISCUSS/DSCN MKR DOCD: CPT | Performed by: SURGERY

## 2023-05-19 NOTE — PROGRESS NOTES
Outpatient Follow Up Note    Angel Castellano is 67 y.o. male who presents today for  follow up regarding:    Chief Complaint   Patient presents with    Other     Patient is coming in for carotid artery stenosis. pamlr        Recent discharge from hospital with TIA/CVA symptoms involving R cerebral hemisphere- unclear source. High grade asymptomatic LICA stenosis noted. He was seen by Dr Maldonado Hernandez in my absence. He reports no further symptoms since discharge. History reviewed. No pertinent past medical history. History reviewed. No pertinent surgical history. Social History:    Social History     Tobacco Use   Smoking Status Never   Smokeless Tobacco Never     Current Medications:  Current Outpatient Medications   Medication Sig Dispense Refill    rivaroxaban (XARELTO) 20 MG TABS tablet Take 1 tablet by mouth Daily with supper 30 tablet 1    atorvastatin (LIPITOR) 80 MG tablet Take 1 tablet by mouth nightly 30 tablet 0    dilTIAZem (CARDIZEM CD) 180 MG extended release capsule Take 1 capsule by mouth daily 30 capsule 1    aspirin 81 MG EC tablet Take 1 tablet by mouth daily      Multiple Vitamins-Minerals (THERAPEUTIC MULTIVITAMIN-MINERALS) tablet Take 1 tablet by mouth daily       No current facility-administered medications for this visit. Allergies:  Patient has no known allergies. REVIEW OF SYSTEMS:   A 14 point review of systems was completed. Pertinent positives identified in the HPI, all other review of systems negative. Objective:   PHYSICAL EXAM:        VITALS:  BP (!) 160/62 (Site: Right Upper Arm)   Ht 5' 11\" (1.803 m)   Wt 212 lb (96.2 kg)   BMI 29.57 kg/m²   CONSTITUTIONAL: Cooperative, no apparent distress, and appears well nourished / developed  NEUROLOGIC:  Awake and oriented to person, place and time. PSYCH: Calm affect. SKIN: Warm and dry. HEENT: Sclera non-icteric, normocephalic, neck supple, normal carotid pulses with no bruits and thyroid normal size.   LUNGS:  No Statement Selected

## 2023-05-24 ENCOUNTER — ANESTHESIA EVENT (OUTPATIENT)
Dept: OPERATING ROOM | Age: 73
DRG: 038 | End: 2023-05-24
Payer: MEDICARE

## 2023-05-25 ENCOUNTER — TELEPHONE (OUTPATIENT)
Dept: VASCULAR SURGERY | Age: 73
End: 2023-05-25

## 2023-05-25 ENCOUNTER — ANESTHESIA (OUTPATIENT)
Dept: OPERATING ROOM | Age: 73
DRG: 038 | End: 2023-05-25
Payer: MEDICARE

## 2023-05-25 ENCOUNTER — HOSPITAL ENCOUNTER (INPATIENT)
Age: 73
LOS: 1 days | Discharge: HOME | DRG: 038 | End: 2023-05-26
Attending: SURGERY | Admitting: SURGERY
Payer: MEDICARE

## 2023-05-25 DIAGNOSIS — I65.22 CAROTID STENOSIS, LEFT: Primary | ICD-10-CM

## 2023-05-25 PROBLEM — I48.19 PERSISTENT ATRIAL FIBRILLATION (HCC): Status: ACTIVE | Noted: 2023-05-14

## 2023-05-25 PROBLEM — I48.3 TYPICAL ATRIAL FLUTTER (HCC): Status: ACTIVE | Noted: 2023-05-25

## 2023-05-25 LAB
ABO + RH BLD: NORMAL
ANION GAP SERPL CALCULATED.3IONS-SCNC: 12 MMOL/L (ref 3–16)
BLD GP AB SCN SERPL QL: NORMAL
BUN SERPL-MCNC: 9 MG/DL (ref 7–20)
CALCIUM SERPL-MCNC: 9.6 MG/DL (ref 8.3–10.6)
CHLORIDE SERPL-SCNC: 101 MMOL/L (ref 99–110)
CO2 SERPL-SCNC: 22 MMOL/L (ref 21–32)
CREAT SERPL-MCNC: <0.5 MG/DL (ref 0.8–1.3)
DEPRECATED RDW RBC AUTO: 13.6 % (ref 12.4–15.4)
GFR SERPLBLD CREATININE-BSD FMLA CKD-EPI: >60 ML/MIN/{1.73_M2}
GLUCOSE SERPL-MCNC: 122 MG/DL (ref 70–99)
HCT VFR BLD AUTO: 48.1 % (ref 40.5–52.5)
HGB BLD-MCNC: 16.8 G/DL (ref 13.5–17.5)
MCH RBC QN AUTO: 36.4 PG (ref 26–34)
MCHC RBC AUTO-ENTMCNC: 35 G/DL (ref 31–36)
MCV RBC AUTO: 104.2 FL (ref 80–100)
PLATELET # BLD AUTO: 135 K/UL (ref 135–450)
PMV BLD AUTO: 8.7 FL (ref 5–10.5)
POTASSIUM SERPL-SCNC: 3.9 MMOL/L (ref 3.5–5.1)
RBC # BLD AUTO: 4.62 M/UL (ref 4.2–5.9)
SODIUM SERPL-SCNC: 135 MMOL/L (ref 136–145)
WBC # BLD AUTO: 8.4 K/UL (ref 4–11)

## 2023-05-25 PROCEDURE — 6360000002 HC RX W HCPCS: Performed by: NURSE ANESTHETIST, CERTIFIED REGISTERED

## 2023-05-25 PROCEDURE — 86900 BLOOD TYPING SEROLOGIC ABO: CPT

## 2023-05-25 PROCEDURE — A4217 STERILE WATER/SALINE, 500 ML: HCPCS | Performed by: SURGERY

## 2023-05-25 PROCEDURE — 2580000003 HC RX 258: Performed by: NURSE ANESTHETIST, CERTIFIED REGISTERED

## 2023-05-25 PROCEDURE — 80048 BASIC METABOLIC PNL TOTAL CA: CPT

## 2023-05-25 PROCEDURE — 2500000003 HC RX 250 WO HCPCS: Performed by: NURSE ANESTHETIST, CERTIFIED REGISTERED

## 2023-05-25 PROCEDURE — 2000000000 HC ICU R&B

## 2023-05-25 PROCEDURE — 86901 BLOOD TYPING SEROLOGIC RH(D): CPT

## 2023-05-25 PROCEDURE — 7100000000 HC PACU RECOVERY - FIRST 15 MIN: Performed by: SURGERY

## 2023-05-25 PROCEDURE — 7100000001 HC PACU RECOVERY - ADDTL 15 MIN: Performed by: SURGERY

## 2023-05-25 PROCEDURE — 2580000003 HC RX 258: Performed by: SURGERY

## 2023-05-25 PROCEDURE — 2500000003 HC RX 250 WO HCPCS: Performed by: ANESTHESIOLOGY

## 2023-05-25 PROCEDURE — 6360000002 HC RX W HCPCS: Performed by: SURGERY

## 2023-05-25 PROCEDURE — 3700000001 HC ADD 15 MINUTES (ANESTHESIA): Performed by: SURGERY

## 2023-05-25 PROCEDURE — 2709999900 HC NON-CHARGEABLE SUPPLY: Performed by: SURGERY

## 2023-05-25 PROCEDURE — 85027 COMPLETE CBC AUTOMATED: CPT

## 2023-05-25 PROCEDURE — 6360000002 HC RX W HCPCS: Performed by: ANESTHESIOLOGY

## 2023-05-25 PROCEDURE — 3700000000 HC ANESTHESIA ATTENDED CARE: Performed by: SURGERY

## 2023-05-25 PROCEDURE — 2500000003 HC RX 250 WO HCPCS: Performed by: INTERNAL MEDICINE

## 2023-05-25 PROCEDURE — 6370000000 HC RX 637 (ALT 250 FOR IP): Performed by: SURGERY

## 2023-05-25 PROCEDURE — 2580000003 HC RX 258: Performed by: INTERNAL MEDICINE

## 2023-05-25 PROCEDURE — 6370000000 HC RX 637 (ALT 250 FOR IP): Performed by: ANESTHESIOLOGY

## 2023-05-25 PROCEDURE — 2500000003 HC RX 250 WO HCPCS: Performed by: SURGERY

## 2023-05-25 PROCEDURE — C1768 GRAFT, VASCULAR: HCPCS | Performed by: SURGERY

## 2023-05-25 PROCEDURE — 3600000007 HC SURGERY HYBRID BASE: Performed by: SURGERY

## 2023-05-25 PROCEDURE — 86850 RBC ANTIBODY SCREEN: CPT

## 2023-05-25 PROCEDURE — 03CJ0ZZ EXTIRPATION OF MATTER FROM LEFT COMMON CAROTID ARTERY, OPEN APPROACH: ICD-10-PCS | Performed by: SURGERY

## 2023-05-25 PROCEDURE — 6360000002 HC RX W HCPCS: Performed by: INTERNAL MEDICINE

## 2023-05-25 PROCEDURE — 93005 ELECTROCARDIOGRAM TRACING: CPT | Performed by: ANESTHESIOLOGY

## 2023-05-25 PROCEDURE — 3600000017 HC SURGERY HYBRID ADDL 15MIN: Performed by: SURGERY

## 2023-05-25 DEVICE — XENOSURE BIOLOGIC PATCH, 0.8CM X 8CM, EIFU
Type: IMPLANTABLE DEVICE | Site: NECK | Status: FUNCTIONAL
Brand: XENOSURE BIOLOGIC PATCH

## 2023-05-25 RX ORDER — PROMETHAZINE HYDROCHLORIDE 25 MG/1
12.5 TABLET ORAL EVERY 6 HOURS PRN
Status: DISCONTINUED | OUTPATIENT
Start: 2023-05-25 | End: 2023-05-25

## 2023-05-25 RX ORDER — ASPIRIN 81 MG/1
81 TABLET ORAL DAILY
Status: DISCONTINUED | OUTPATIENT
Start: 2023-05-26 | End: 2023-05-26

## 2023-05-25 RX ORDER — SODIUM CHLORIDE 0.9 % (FLUSH) 0.9 %
5-40 SYRINGE (ML) INJECTION PRN
Status: DISCONTINUED | OUTPATIENT
Start: 2023-05-25 | End: 2023-05-26 | Stop reason: HOSPADM

## 2023-05-25 RX ORDER — SODIUM CHLORIDE 9 MG/ML
INJECTION, SOLUTION INTRAVENOUS PRN
Status: DISCONTINUED | OUTPATIENT
Start: 2023-05-25 | End: 2023-05-25 | Stop reason: HOSPADM

## 2023-05-25 RX ORDER — DILTIAZEM HYDROCHLORIDE 180 MG/1
180 CAPSULE, COATED, EXTENDED RELEASE ORAL NIGHTLY
Status: DISCONTINUED | OUTPATIENT
Start: 2023-05-25 | End: 2023-05-26 | Stop reason: HOSPADM

## 2023-05-25 RX ORDER — SODIUM CHLORIDE 0.9 % (FLUSH) 0.9 %
5-40 SYRINGE (ML) INJECTION EVERY 12 HOURS SCHEDULED
Status: DISCONTINUED | OUTPATIENT
Start: 2023-05-25 | End: 2023-05-26 | Stop reason: HOSPADM

## 2023-05-25 RX ORDER — OXYCODONE HYDROCHLORIDE 5 MG/1
10 TABLET ORAL PRN
Status: COMPLETED | OUTPATIENT
Start: 2023-05-25 | End: 2023-05-25

## 2023-05-25 RX ORDER — ROCURONIUM BROMIDE 10 MG/ML
INJECTION, SOLUTION INTRAVENOUS PRN
Status: DISCONTINUED | OUTPATIENT
Start: 2023-05-25 | End: 2023-05-25 | Stop reason: SDUPTHER

## 2023-05-25 RX ORDER — NITROGLYCERIN 20 MG/100ML
5 INJECTION INTRAVENOUS CONTINUOUS PRN
Status: DISCONTINUED | OUTPATIENT
Start: 2023-05-25 | End: 2023-05-26 | Stop reason: HOSPADM

## 2023-05-25 RX ORDER — OXYCODONE HYDROCHLORIDE 5 MG/1
5 TABLET ORAL PRN
Status: COMPLETED | OUTPATIENT
Start: 2023-05-25 | End: 2023-05-25

## 2023-05-25 RX ORDER — HYDRALAZINE HYDROCHLORIDE 20 MG/ML
10 INJECTION INTRAMUSCULAR; INTRAVENOUS
Status: DISCONTINUED | OUTPATIENT
Start: 2023-05-25 | End: 2023-05-26 | Stop reason: HOSPADM

## 2023-05-25 RX ORDER — MEPERIDINE HYDROCHLORIDE 50 MG/ML
12.5 INJECTION INTRAMUSCULAR; INTRAVENOUS; SUBCUTANEOUS EVERY 5 MIN PRN
Status: DISCONTINUED | OUTPATIENT
Start: 2023-05-25 | End: 2023-05-25 | Stop reason: HOSPADM

## 2023-05-25 RX ORDER — OXYCODONE HYDROCHLORIDE 5 MG/1
5 TABLET ORAL EVERY 4 HOURS PRN
Status: DISCONTINUED | OUTPATIENT
Start: 2023-05-25 | End: 2023-05-26 | Stop reason: HOSPADM

## 2023-05-25 RX ORDER — ONDANSETRON 2 MG/ML
INJECTION INTRAMUSCULAR; INTRAVENOUS
Status: DISPENSED
Start: 2023-05-25 | End: 2023-05-26

## 2023-05-25 RX ORDER — DILTIAZEM HYDROCHLORIDE 5 MG/ML
10 INJECTION INTRAVENOUS ONCE
Status: COMPLETED | OUTPATIENT
Start: 2023-05-25 | End: 2023-05-25

## 2023-05-25 RX ORDER — LABETALOL HYDROCHLORIDE 5 MG/ML
5 INJECTION, SOLUTION INTRAVENOUS EVERY 10 MIN PRN
Status: DISCONTINUED | OUTPATIENT
Start: 2023-05-25 | End: 2023-05-25 | Stop reason: HOSPADM

## 2023-05-25 RX ORDER — MORPHINE SULFATE 2 MG/ML
2 INJECTION, SOLUTION INTRAMUSCULAR; INTRAVENOUS
Status: DISCONTINUED | OUTPATIENT
Start: 2023-05-25 | End: 2023-05-26 | Stop reason: HOSPADM

## 2023-05-25 RX ORDER — SODIUM CHLORIDE 0.9 % (FLUSH) 0.9 %
5-40 SYRINGE (ML) INJECTION PRN
Status: DISCONTINUED | OUTPATIENT
Start: 2023-05-25 | End: 2023-05-25 | Stop reason: HOSPADM

## 2023-05-25 RX ORDER — SODIUM CHLORIDE, SODIUM LACTATE, POTASSIUM CHLORIDE, CALCIUM CHLORIDE 600; 310; 30; 20 MG/100ML; MG/100ML; MG/100ML; MG/100ML
INJECTION, SOLUTION INTRAVENOUS CONTINUOUS PRN
Status: DISCONTINUED | OUTPATIENT
Start: 2023-05-25 | End: 2023-05-25 | Stop reason: SDUPTHER

## 2023-05-25 RX ORDER — SODIUM CHLORIDE 0.9 % (FLUSH) 0.9 %
5-40 SYRINGE (ML) INJECTION EVERY 12 HOURS SCHEDULED
Status: DISCONTINUED | OUTPATIENT
Start: 2023-05-25 | End: 2023-05-25 | Stop reason: HOSPADM

## 2023-05-25 RX ORDER — SODIUM CHLORIDE, SODIUM LACTATE, POTASSIUM CHLORIDE, CALCIUM CHLORIDE 600; 310; 30; 20 MG/100ML; MG/100ML; MG/100ML; MG/100ML
INJECTION, SOLUTION INTRAVENOUS CONTINUOUS
Status: DISCONTINUED | OUTPATIENT
Start: 2023-05-25 | End: 2023-05-25 | Stop reason: HOSPADM

## 2023-05-25 RX ORDER — HEPARIN SODIUM 10000 [USP'U]/ML
INJECTION, SOLUTION INTRAVENOUS; SUBCUTANEOUS PRN
Status: DISCONTINUED | OUTPATIENT
Start: 2023-05-25 | End: 2023-05-25 | Stop reason: SDUPTHER

## 2023-05-25 RX ORDER — PROCHLORPERAZINE EDISYLATE 5 MG/ML
10 INJECTION INTRAMUSCULAR; INTRAVENOUS EVERY 6 HOURS PRN
Status: DISCONTINUED | OUTPATIENT
Start: 2023-05-25 | End: 2023-05-26 | Stop reason: HOSPADM

## 2023-05-25 RX ORDER — DEXAMETHASONE SODIUM PHOSPHATE 4 MG/ML
INJECTION, SOLUTION INTRA-ARTICULAR; INTRALESIONAL; INTRAMUSCULAR; INTRAVENOUS; SOFT TISSUE PRN
Status: DISCONTINUED | OUTPATIENT
Start: 2023-05-25 | End: 2023-05-25 | Stop reason: SDUPTHER

## 2023-05-25 RX ORDER — SODIUM CHLORIDE 9 MG/ML
INJECTION, SOLUTION INTRAVENOUS CONTINUOUS
Status: DISCONTINUED | OUTPATIENT
Start: 2023-05-25 | End: 2023-05-26

## 2023-05-25 RX ORDER — SODIUM CHLORIDE 9 MG/ML
INJECTION, SOLUTION INTRAVENOUS PRN
Status: DISCONTINUED | OUTPATIENT
Start: 2023-05-25 | End: 2023-05-26 | Stop reason: HOSPADM

## 2023-05-25 RX ORDER — FAMOTIDINE 10 MG/ML
20 INJECTION, SOLUTION INTRAVENOUS ONCE
Status: COMPLETED | OUTPATIENT
Start: 2023-05-25 | End: 2023-05-25

## 2023-05-25 RX ORDER — KETAMINE HCL IN NACL, ISO-OSM 100MG/10ML
SYRINGE (ML) INJECTION PRN
Status: DISCONTINUED | OUTPATIENT
Start: 2023-05-25 | End: 2023-05-25 | Stop reason: SDUPTHER

## 2023-05-25 RX ORDER — NITROGLYCERIN 5 MG/ML
INJECTION, SOLUTION INTRAVENOUS PRN
Status: DISCONTINUED | OUTPATIENT
Start: 2023-05-25 | End: 2023-05-25 | Stop reason: SDUPTHER

## 2023-05-25 RX ORDER — OXYCODONE HYDROCHLORIDE 5 MG/1
10 TABLET ORAL EVERY 4 HOURS PRN
Status: DISCONTINUED | OUTPATIENT
Start: 2023-05-25 | End: 2023-05-26 | Stop reason: HOSPADM

## 2023-05-25 RX ORDER — DILTIAZEM HYDROCHLORIDE 180 MG/1
180 CAPSULE, COATED, EXTENDED RELEASE ORAL DAILY
Status: DISCONTINUED | OUTPATIENT
Start: 2023-05-25 | End: 2023-05-25 | Stop reason: SDUPTHER

## 2023-05-25 RX ORDER — LABETALOL HYDROCHLORIDE 5 MG/ML
INJECTION, SOLUTION INTRAVENOUS PRN
Status: DISCONTINUED | OUTPATIENT
Start: 2023-05-25 | End: 2023-05-25 | Stop reason: SDUPTHER

## 2023-05-25 RX ORDER — ONDANSETRON 2 MG/ML
4 INJECTION INTRAMUSCULAR; INTRAVENOUS EVERY 6 HOURS PRN
Status: DISCONTINUED | OUTPATIENT
Start: 2023-05-25 | End: 2023-05-25

## 2023-05-25 RX ORDER — PROPOFOL 10 MG/ML
INJECTION, EMULSION INTRAVENOUS PRN
Status: DISCONTINUED | OUTPATIENT
Start: 2023-05-25 | End: 2023-05-25 | Stop reason: SDUPTHER

## 2023-05-25 RX ORDER — ATORVASTATIN CALCIUM 80 MG/1
80 TABLET, FILM COATED ORAL EVERY MORNING
Status: DISCONTINUED | OUTPATIENT
Start: 2023-05-26 | End: 2023-05-26 | Stop reason: HOSPADM

## 2023-05-25 RX ORDER — MORPHINE SULFATE 4 MG/ML
4 INJECTION, SOLUTION INTRAMUSCULAR; INTRAVENOUS
Status: DISCONTINUED | OUTPATIENT
Start: 2023-05-25 | End: 2023-05-26 | Stop reason: HOSPADM

## 2023-05-25 RX ORDER — SODIUM NITROPRUSSIDE 25 MG/ML
INJECTION INTRAVENOUS PRN
Status: DISCONTINUED | OUTPATIENT
Start: 2023-05-25 | End: 2023-05-25

## 2023-05-25 RX ORDER — DIPHENHYDRAMINE HYDROCHLORIDE 50 MG/ML
12.5 INJECTION INTRAMUSCULAR; INTRAVENOUS
Status: DISCONTINUED | OUTPATIENT
Start: 2023-05-25 | End: 2023-05-25 | Stop reason: HOSPADM

## 2023-05-25 RX ORDER — FENTANYL CITRATE 50 UG/ML
INJECTION, SOLUTION INTRAMUSCULAR; INTRAVENOUS PRN
Status: DISCONTINUED | OUTPATIENT
Start: 2023-05-25 | End: 2023-05-25 | Stop reason: SDUPTHER

## 2023-05-25 RX ORDER — ONDANSETRON 2 MG/ML
4 INJECTION INTRAMUSCULAR; INTRAVENOUS
Status: DISCONTINUED | OUTPATIENT
Start: 2023-05-25 | End: 2023-05-25 | Stop reason: HOSPADM

## 2023-05-25 RX ORDER — MIDAZOLAM HYDROCHLORIDE 1 MG/ML
INJECTION INTRAMUSCULAR; INTRAVENOUS PRN
Status: DISCONTINUED | OUTPATIENT
Start: 2023-05-25 | End: 2023-05-25 | Stop reason: SDUPTHER

## 2023-05-25 RX ORDER — NITROGLYCERIN 20 MG/100ML
INJECTION INTRAVENOUS CONTINUOUS PRN
Status: DISCONTINUED | OUTPATIENT
Start: 2023-05-25 | End: 2023-05-25 | Stop reason: SDUPTHER

## 2023-05-25 RX ORDER — CEFAZOLIN SODIUM IN 0.9 % NACL 2 G/100 ML
2000 PLASTIC BAG, INJECTION (ML) INTRAVENOUS
Status: COMPLETED | OUTPATIENT
Start: 2023-05-25 | End: 2023-05-25

## 2023-05-25 RX ORDER — CLONIDINE HYDROCHLORIDE 0.1 MG/1
0.1 TABLET ORAL
Status: DISCONTINUED | OUTPATIENT
Start: 2023-05-25 | End: 2023-05-26 | Stop reason: HOSPADM

## 2023-05-25 RX ORDER — PHENYLEPHRINE HCL IN 0.9% NACL 1 MG/10 ML
SYRINGE (ML) INTRAVENOUS PRN
Status: DISCONTINUED | OUTPATIENT
Start: 2023-05-25 | End: 2023-05-25 | Stop reason: SDUPTHER

## 2023-05-25 RX ORDER — ONDANSETRON 2 MG/ML
INJECTION INTRAMUSCULAR; INTRAVENOUS PRN
Status: DISCONTINUED | OUTPATIENT
Start: 2023-05-25 | End: 2023-05-25 | Stop reason: SDUPTHER

## 2023-05-25 RX ORDER — ONDANSETRON 2 MG/ML
4 INJECTION INTRAMUSCULAR; INTRAVENOUS EVERY 4 HOURS PRN
Status: DISCONTINUED | OUTPATIENT
Start: 2023-05-25 | End: 2023-05-26 | Stop reason: HOSPADM

## 2023-05-25 RX ORDER — LIDOCAINE HYDROCHLORIDE 20 MG/ML
INJECTION, SOLUTION INFILTRATION; PERINEURAL PRN
Status: DISCONTINUED | OUTPATIENT
Start: 2023-05-25 | End: 2023-05-25 | Stop reason: SDUPTHER

## 2023-05-25 RX ADMIN — Medication 10 ML: at 23:35

## 2023-05-25 RX ADMIN — Medication 50 MCG: at 14:57

## 2023-05-25 RX ADMIN — Medication 100 MCG: at 14:35

## 2023-05-25 RX ADMIN — LIDOCAINE HYDROCHLORIDE 60 MG: 20 INJECTION, SOLUTION INFILTRATION; PERINEURAL at 13:40

## 2023-05-25 RX ADMIN — FENTANYL CITRATE 100 MCG: 50 INJECTION, SOLUTION INTRAMUSCULAR; INTRAVENOUS at 13:40

## 2023-05-25 RX ADMIN — LABETALOL HYDROCHLORIDE 5 MG: 5 INJECTION, SOLUTION INTRAVENOUS at 15:52

## 2023-05-25 RX ADMIN — HEPARIN SODIUM 1000 UNITS: 10000 INJECTION, SOLUTION INTRAVENOUS; SUBCUTANEOUS at 15:01

## 2023-05-25 RX ADMIN — Medication 100 MCG: at 14:10

## 2023-05-25 RX ADMIN — DILTIAZEM HYDROCHLORIDE 10 MG: 5 INJECTION INTRAVENOUS at 22:26

## 2023-05-25 RX ADMIN — NITROGLYCERIN 160 MCG/MIN: 20 INJECTION INTRAVENOUS at 16:24

## 2023-05-25 RX ADMIN — ROCURONIUM BROMIDE 50 MG: 10 SOLUTION INTRAVENOUS at 13:40

## 2023-05-25 RX ADMIN — Medication 100 MCG: at 14:59

## 2023-05-25 RX ADMIN — NITROGLYCERIN 170 MCG/MIN: 20 INJECTION INTRAVENOUS at 19:29

## 2023-05-25 RX ADMIN — NITROGLYCERIN 25 MCG: 5 INJECTION, SOLUTION INTRAVENOUS at 14:55

## 2023-05-25 RX ADMIN — OXYCODONE HYDROCHLORIDE 5 MG: 5 TABLET ORAL at 18:14

## 2023-05-25 RX ADMIN — NITROGLYCERIN 25 MCG: 5 INJECTION, SOLUTION INTRAVENOUS at 15:02

## 2023-05-25 RX ADMIN — FENTANYL CITRATE 50 MCG: 50 INJECTION, SOLUTION INTRAMUSCULAR; INTRAVENOUS at 14:32

## 2023-05-25 RX ADMIN — DEXAMETHASONE SODIUM PHOSPHATE 4 MG: 4 INJECTION, SOLUTION INTRAMUSCULAR; INTRAVENOUS at 13:58

## 2023-05-25 RX ADMIN — Medication 2000 MG: at 13:34

## 2023-05-25 RX ADMIN — Medication 100 MCG: at 14:40

## 2023-05-25 RX ADMIN — PHENYLEPHRINE HYDROCHLORIDE 50 MCG/MIN: 10 INJECTION INTRAVENOUS at 13:18

## 2023-05-25 RX ADMIN — DILTIAZEM HYDROCHLORIDE 180 MG: 180 CAPSULE, COATED, EXTENDED RELEASE ORAL at 21:25

## 2023-05-25 RX ADMIN — NITROGLYCERIN 50 MCG: 5 INJECTION, SOLUTION INTRAVENOUS at 15:37

## 2023-05-25 RX ADMIN — Medication 100 MCG: at 13:47

## 2023-05-25 RX ADMIN — SODIUM CHLORIDE, SODIUM LACTATE, POTASSIUM CHLORIDE, CALCIUM CHLORIDE: 600; 310; 30; 20 INJECTION, SOLUTION INTRAVENOUS at 13:49

## 2023-05-25 RX ADMIN — Medication 100 MCG: at 14:43

## 2023-05-25 RX ADMIN — ONDANSETRON 4 MG: 2 INJECTION INTRAMUSCULAR; INTRAVENOUS at 19:17

## 2023-05-25 RX ADMIN — LABETALOL HYDROCHLORIDE 10 MG: 5 INJECTION, SOLUTION INTRAVENOUS at 15:47

## 2023-05-25 RX ADMIN — HYDROMORPHONE HYDROCHLORIDE 0.5 MG: 1 INJECTION, SOLUTION INTRAMUSCULAR; INTRAVENOUS; SUBCUTANEOUS at 17:53

## 2023-05-25 RX ADMIN — Medication 100 MCG: at 15:12

## 2023-05-25 RX ADMIN — FENTANYL CITRATE 50 MCG: 50 INJECTION, SOLUTION INTRAMUSCULAR; INTRAVENOUS at 15:50

## 2023-05-25 RX ADMIN — FENTANYL CITRATE 100 MCG: 50 INJECTION, SOLUTION INTRAMUSCULAR; INTRAVENOUS at 15:55

## 2023-05-25 RX ADMIN — SUGAMMADEX 200 MG: 100 INJECTION, SOLUTION INTRAVENOUS at 15:35

## 2023-05-25 RX ADMIN — PHENYLEPHRINE HYDROCHLORIDE 50 MCG/MIN: 10 INJECTION INTRAVENOUS at 13:46

## 2023-05-25 RX ADMIN — Medication 100 MCG: at 14:51

## 2023-05-25 RX ADMIN — SODIUM CHLORIDE, SODIUM LACTATE, POTASSIUM CHLORIDE, AND CALCIUM CHLORIDE: .6; .31; .03; .02 INJECTION, SOLUTION INTRAVENOUS at 13:34

## 2023-05-25 RX ADMIN — Medication 100 MCG: at 14:28

## 2023-05-25 RX ADMIN — NITROGLYCERIN 100 MCG: 5 INJECTION, SOLUTION INTRAVENOUS at 15:38

## 2023-05-25 RX ADMIN — FAMOTIDINE 20 MG: 10 INJECTION, SOLUTION INTRAVENOUS at 11:52

## 2023-05-25 RX ADMIN — Medication 20 MG: at 14:03

## 2023-05-25 RX ADMIN — NITROGLYCERIN 100 MCG: 5 INJECTION, SOLUTION INTRAVENOUS at 15:44

## 2023-05-25 RX ADMIN — Medication 10 MG: at 14:30

## 2023-05-25 RX ADMIN — HYDROMORPHONE HYDROCHLORIDE 0.5 MG: 1 INJECTION, SOLUTION INTRAMUSCULAR; INTRAVENOUS; SUBCUTANEOUS at 17:11

## 2023-05-25 RX ADMIN — MIDAZOLAM HYDROCHLORIDE 2 MG: 2 INJECTION, SOLUTION INTRAMUSCULAR; INTRAVENOUS at 13:33

## 2023-05-25 RX ADMIN — PROCHLORPERAZINE EDISYLATE 10 MG: 5 INJECTION INTRAMUSCULAR; INTRAVENOUS at 22:35

## 2023-05-25 RX ADMIN — PROPOFOL 200 MG: 10 INJECTION, EMULSION INTRAVENOUS at 13:40

## 2023-05-25 RX ADMIN — NITROGLYCERIN 100 MCG: 5 INJECTION, SOLUTION INTRAVENOUS at 15:42

## 2023-05-25 RX ADMIN — Medication 50 MCG: at 15:25

## 2023-05-25 RX ADMIN — Medication 50 MCG: at 15:16

## 2023-05-25 RX ADMIN — NITROGLYCERIN 50 MCG/MIN: 20 INJECTION INTRAVENOUS at 15:40

## 2023-05-25 RX ADMIN — NITROGLYCERIN 50 MCG: 5 INJECTION, SOLUTION INTRAVENOUS at 15:40

## 2023-05-25 RX ADMIN — Medication 100 MCG: at 14:12

## 2023-05-25 RX ADMIN — HEPARIN SODIUM 5000 UNITS: 10000 INJECTION, SOLUTION INTRAVENOUS; SUBCUTANEOUS at 14:21

## 2023-05-25 RX ADMIN — Medication 50 MCG: at 15:11

## 2023-05-25 RX ADMIN — LABETALOL HYDROCHLORIDE 5 MG: 5 INJECTION, SOLUTION INTRAVENOUS at 16:06

## 2023-05-25 RX ADMIN — SODIUM CHLORIDE: 9 INJECTION, SOLUTION INTRAVENOUS at 22:02

## 2023-05-25 RX ADMIN — Medication 100 MCG: at 14:25

## 2023-05-25 RX ADMIN — NITROGLYCERIN 50 MCG: 5 INJECTION, SOLUTION INTRAVENOUS at 14:57

## 2023-05-25 RX ADMIN — NITROGLYCERIN 100 MCG: 5 INJECTION, SOLUTION INTRAVENOUS at 15:53

## 2023-05-25 RX ADMIN — FENTANYL CITRATE 50 MCG: 50 INJECTION, SOLUTION INTRAMUSCULAR; INTRAVENOUS at 14:53

## 2023-05-25 RX ADMIN — NITROGLYCERIN 100 MCG: 5 INJECTION, SOLUTION INTRAVENOUS at 15:49

## 2023-05-25 RX ADMIN — SODIUM CHLORIDE 5 MG/HR: 900 INJECTION, SOLUTION INTRAVENOUS at 23:44

## 2023-05-25 RX ADMIN — FENTANYL CITRATE 50 MCG: 50 INJECTION, SOLUTION INTRAMUSCULAR; INTRAVENOUS at 16:11

## 2023-05-25 RX ADMIN — FENTANYL CITRATE 50 MCG: 50 INJECTION, SOLUTION INTRAMUSCULAR; INTRAVENOUS at 16:05

## 2023-05-25 RX ADMIN — NITROGLYCERIN 50 MCG: 5 INJECTION, SOLUTION INTRAVENOUS at 15:04

## 2023-05-25 RX ADMIN — SODIUM CHLORIDE, SODIUM LACTATE, POTASSIUM CHLORIDE, AND CALCIUM CHLORIDE: .6; .31; .03; .02 INJECTION, SOLUTION INTRAVENOUS at 15:03

## 2023-05-25 RX ADMIN — Medication 100 MCG: at 14:49

## 2023-05-25 RX ADMIN — ROCURONIUM BROMIDE 20 MG: 10 SOLUTION INTRAVENOUS at 14:46

## 2023-05-25 RX ADMIN — NITROGLYCERIN 100 MCG: 5 INJECTION, SOLUTION INTRAVENOUS at 15:47

## 2023-05-25 RX ADMIN — ONDANSETRON 4 MG: 2 INJECTION INTRAMUSCULAR; INTRAVENOUS at 13:58

## 2023-05-25 ASSESSMENT — PAIN SCALES - GENERAL
PAINLEVEL_OUTOF10: 6
PAINLEVEL_OUTOF10: 4
PAINLEVEL_OUTOF10: 0
PAINLEVEL_OUTOF10: 6
PAINLEVEL_OUTOF10: 6
PAINLEVEL_OUTOF10: 3
PAINLEVEL_OUTOF10: 3

## 2023-05-25 ASSESSMENT — PAIN DESCRIPTION - LOCATION
LOCATION: NECK

## 2023-05-25 ASSESSMENT — PAIN DESCRIPTION - PAIN TYPE
TYPE: SURGICAL PAIN

## 2023-05-25 ASSESSMENT — PAIN DESCRIPTION - ORIENTATION
ORIENTATION: LEFT

## 2023-05-25 ASSESSMENT — PAIN DESCRIPTION - DESCRIPTORS
DESCRIPTORS: ACHING

## 2023-05-25 ASSESSMENT — PAIN - FUNCTIONAL ASSESSMENT: PAIN_FUNCTIONAL_ASSESSMENT: 0-10

## 2023-05-25 NOTE — ANESTHESIA PRE PROCEDURE
Department of Anesthesiology  Preprocedure Note       Name:  Samuel Chatman   Age:  67 y.o.  :  1950                                          MRN:  [id number]         Date:  2023      Surgeon: Ben Dunn):  Kaelyn Maravilla MD    Procedure: Procedure(s):  LEFT CAROTID ENDARTERECTOMY    Medications prior to admission:   Prior to Admission medications    Medication Sig Start Date End Date Taking?  Authorizing Provider   rivaroxaban (XARELTO) 20 MG TABS tablet Take 1 tablet by mouth Daily with supper 5/15/23   SMILEY Bourne CNP   atorvastatin (LIPITOR) 80 MG tablet Take 1 tablet by mouth nightly  Patient taking differently: Take 1 tablet by mouth every morning 5/15/23   SMILEY Bourne CNP   dilTIAZem (CARDIZEM CD) 180 MG extended release capsule Take 1 capsule by mouth daily  Patient taking differently: Take 1 capsule by mouth at bedtime 5/15/23   SMILEY Bourne CNP   aspirin 81 MG EC tablet Take 1 tablet by mouth daily    Historical Provider, MD   Multiple Vitamins-Minerals (THERAPEUTIC MULTIVITAMIN-MINERALS) tablet Take 1 tablet by mouth daily    Historical Provider, MD       Current medications:    Current Facility-Administered Medications   Medication Dose Route Frequency Provider Last Rate Last Admin    famotidine (PEPCID) injection 20 mg  20 mg IntraVENous Once Lazarus Goldberg, MD        lactated ringers IV soln infusion   IntraVENous Continuous Lazarus Goldberg, MD        sodium chloride flush 0.9 % injection 5-40 mL  5-40 mL IntraVENous 2 times per day Lazarus Goldberg, MD        sodium chloride flush 0.9 % injection 5-40 mL  5-40 mL IntraVENous PRN Lazarus Goldberg, MD        0.9 % sodium chloride infusion   IntraVENous PRN Lazarus Goldberg, MD         Current Outpatient Medications   Medication Sig Dispense Refill    rivaroxaban (XARELTO) 20 MG TABS tablet Take 1 tablet by mouth Daily with supper 30 tablet 1    atorvastatin (LIPITOR) 80

## 2023-05-25 NOTE — CONSULTS
Electrophysiology Consultation   Date: 5/25/2023  Admit Date:  5/25/2023  Reason for Consultation: Atrial fibrillation and flutter with RVR. Consult Requesting Physician: Dwight Harris MD     No chief complaint on file. HPI:   Mr. Colt Turcios is a pleasant 67year old male with a medical history significant for atrial fibrillation with RVR, peripheral vascular disease (left carotid stenosis) and cerebral vascular disease who presents from home for left carotid endarterectomy whose post operative course is complicated by atrial fibrillation with RVR. According to patient he has been suffering from fatigue since we last interviewed. He otherwise has been doing well. He reports some left sided neck pain that is controlled post operatively. Patient denies fevers, chest pain, orthopnea, PND, lower extremity edema, abdominal swelling, shortness of breath, dyspnea on exertion, chills, visual changes, headaches, sore throat, cough, abdominal pain, nausea, vomiting, bleeding, bruising, dysuria, muscle/joint pain, confusion, depression, anxiety, skin lesions, etc.    Past Medical History:   Diagnosis Date    Atrial fibrillation with rapid ventricular response (HCC)     CAD (coronary artery disease)     CVA (cerebral vascular accident) ([facility] [address]     Hearing loss     right    Hyperlipidemia     Hypertension     TIA (transient ischemic attack)         Past Surgical History:   Procedure Laterality Date    COLONOSCOPY      HERNIA REPAIR      TIBIA FRACTURE SURGERY Left        No Known Allergies    Social History:  Reviewed. reports that he quit smoking about 21 years ago. His smoking use included cigarettes. He has never used smokeless tobacco. He reports current alcohol use of about 6.0 standard drinks per week. He reports that he does not use drugs. Family History:  Reviewed. family history is not on file. No premature CAD. Review of System:  All other systems reviewed except for that noted above.  Pertinent

## 2023-05-25 NOTE — H&P
I have reviewed the history and physical (See note dated 5/19/2023) and examined the patient and find no relevant changes. I have reviewed with the patient and/or family the risks, benefits, and alternatives to the procedure.

## 2023-05-25 NOTE — TELEPHONE ENCOUNTER
Faxed Rx to [facility] for the Xarelto 20 mg 1 tab PO QD Qty. 30 with 3 additional refills.  Pamlr

## 2023-05-25 NOTE — ANESTHESIA PROCEDURE NOTES
Arterial Line:    An arterial line was placed using ultrasound guidance and surface landmarks, in the pre-op for the following indication(s): continuous blood pressure monitoring and blood sampling needed. A 20 gauge (size), 1 and 3/8 inch (length), Angiocath (type) catheter was placed, Seldinger technique not used, into the right radial artery, secured by tape.   Anesthesia type: Local    Events:  patient tolerated procedure well with no complications and EBL 4KG.2/96/8169 12:11 PM5/25/2023 12:12 PM  Anesthesiologist: Salma Vincent MD  Performed: Anesthesiologist   Preanesthetic Checklist  Completed: patient identified, IV checked, site marked, risks and benefits discussed, surgical/procedural consents, equipment checked, pre-op evaluation, timeout performed, anesthesia consent given, oxygen available and monitors applied/VS acknowledged

## 2023-05-25 NOTE — ANESTHESIA POSTPROCEDURE EVALUATION
Department of Anesthesiology  Postprocedure Note    Patient: Carlos Corrigan  MRN: [id number]  YOB: 1950  Date of evaluation: 5/25/2023      Procedure Summary     Date: 05/25/23 Room / Location: [place] [address] (HYBRID) / [facility]    Anesthesia Start: 1762 Anesthesia Stop: 1847    Procedure: LEFT CAROTID ENDARTERECTOMY (Left: Neck) Diagnosis:       Carotid stenosis, bilateral      (CAROTID ARTERY STENOSIS)    Surgeons: Zoila Velasco MD Responsible Provider: Meliton Oates MD    Anesthesia Type: general ASA Status: 3          Anesthesia Type: No value filed.     Neisha Phase I: Neisha Score: 9    Neisha Phase II:        Anesthesia Post Evaluation    Comments: Postoperative Anesthesia Note    Name:    Carlos Corrigan  MRN:      [id number]    Patient Vitals in the past 12 hrs:  05/25/23 1800, BP:(!) 144/90, Pulse:(!) 109, SpO2:97 %  05/25/23 1753, BP:(!) 148/91, Pulse:98, SpO2:96 %  05/25/23 1745, BP:(!) 164/74, Pulse:(!) 102, SpO2:98 %  05/25/23 1730, BP:(!) 141/47, Pulse:96, SpO2:96 %  05/25/23 1715, BP:(!) 149/96, Pulse:98, SpO2:96 %  05/25/23 1711, Pulse:(!) 104, SpO2:97 %  05/25/23 1700, BP:135/63, Pulse:(!) 110, SpO2:95 %  05/25/23 1645, BP:(!) 148/99, Pulse:(!) 102, SpO2:92 %  05/25/23 1630, BP:(!) 164/93, Pulse:(!) 112, SpO2:(!) 87 %  05/25/23 1625, Pulse:(!) 116, SpO2:97 %  05/25/23 1624, BP:(!) 156/106, Pulse:91  05/25/23 1620, BP:(!) 156/101, Pulse:(!) 118, SpO2:95 %  05/25/23 1615, BP:(!) 171/93, Pulse:98, SpO2:95 %  05/25/23 1610, BP:(!) 158/101, Pulse:(!) 109, SpO2:96 %  05/25/23 1605, BP:(!) 149/93, Pulse:(!) 111, SpO2:94 %  05/25/23 1603, BP:(!) 164/107, Temp:98 °F (36.7 °C), Temp src:Temporal, Pulse:(!) 105, Resp:16, SpO2:97 %  05/25/23 1058, BP:(!) 164/101, Temp:98.8 °F (37.1 °C), Temp src:Temporal, Pulse:(!) 104, Resp:16, SpO2:97 %, Height:6' (1.829 m), Weight:207 lb 14.4 oz (94.3 kg)     LABS:    CBC  Lab Results       Component                Value               Date/Time

## 2023-05-25 NOTE — BRIEF OP NOTE
Brief Postoperative Note      Patient: Stone Anand  YOB: 1950  MRN: [id number]    Date of Procedure: 5/25/2023    Pre-Op Diagnosis Codes:     * Carotid stenosis, bilateral [I65.23]    Post-Op Diagnosis: Same       Procedure(s):  LEFT CAROTID ENDARTERECTOMY    Surgeon(s):  Providence Canavan, MD    Assistant:  Surgical Assistant: Jazmin Rahman Caro    Anesthesia: General    Estimated Blood Loss (mL): less than 510     Complications: None    Specimens:   * No specimens in log *    Implants:  Implant Name Type Inv.  Item Serial No.  Lot No. LRB No. Used Action   GRAFT VASC W0.8XL8CM THK0.35-0.75MM CAR PERICARD PROC BOV -  Vascular grafts GRAFT VASC W0.8XL8CM THK0.35-0.75MM CAR PERICARD PROC BOV 0000 [employer]-WD [id number] Left 1 Implanted         Drains:   Closed/Suction Drain Left Neck Bulb (Active)   Dressing Status New dressing applied 05/25/23 1539           Electronically signed by Providence Canavan, MD on 5/25/2023 at 3:51 PM

## 2023-05-26 VITALS
HEIGHT: 72 IN | SYSTOLIC BLOOD PRESSURE: 136 MMHG | WEIGHT: 207.9 LBS | RESPIRATION RATE: 15 BRPM | HEART RATE: 88 BPM | TEMPERATURE: 97.6 F | OXYGEN SATURATION: 96 % | BODY MASS INDEX: 28.16 KG/M2 | DIASTOLIC BLOOD PRESSURE: 61 MMHG

## 2023-05-26 LAB
ANION GAP SERPL CALCULATED.3IONS-SCNC: 12 MMOL/L (ref 3–16)
BASOPHILS # BLD: 0 K/UL (ref 0–0.2)
BASOPHILS NFR BLD: 0.2 %
BUN SERPL-MCNC: 12 MG/DL (ref 7–20)
CALCIUM SERPL-MCNC: 8.2 MG/DL (ref 8.3–10.6)
CHLORIDE SERPL-SCNC: 99 MMOL/L (ref 99–110)
CO2 SERPL-SCNC: 24 MMOL/L (ref 21–32)
CREAT SERPL-MCNC: 0.7 MG/DL (ref 0.8–1.3)
DEPRECATED RDW RBC AUTO: 13.6 % (ref 12.4–15.4)
EKG ATRIAL RATE: 337 BPM
EKG DIAGNOSIS: NORMAL
EKG P AXIS: 42 DEGREES
EKG Q-T INTERVAL: 350 MS
EKG QRS DURATION: 90 MS
EKG QTC CALCULATION (BAZETT): 471 MS
EKG R AXIS: 16 DEGREES
EKG T AXIS: 10 DEGREES
EKG VENTRICULAR RATE: 109 BPM
EOSINOPHIL # BLD: 0 K/UL (ref 0–0.6)
EOSINOPHIL NFR BLD: 0 %
GFR SERPLBLD CREATININE-BSD FMLA CKD-EPI: >60 ML/MIN/{1.73_M2}
GLUCOSE SERPL-MCNC: 208 MG/DL (ref 70–99)
HCT VFR BLD AUTO: 42.3 % (ref 40.5–52.5)
HGB BLD-MCNC: 14.6 G/DL (ref 13.5–17.5)
LYMPHOCYTES # BLD: 0.6 K/UL (ref 1–5.1)
LYMPHOCYTES NFR BLD: 6.2 %
MAGNESIUM SERPL-MCNC: 1.4 MG/DL (ref 1.8–2.4)
MCH RBC QN AUTO: 36.3 PG (ref 26–34)
MCHC RBC AUTO-ENTMCNC: 34.5 G/DL (ref 31–36)
MCV RBC AUTO: 105.3 FL (ref 80–100)
MONOCYTES # BLD: 1 K/UL (ref 0–1.3)
MONOCYTES NFR BLD: 11.5 %
NEUTROPHILS # BLD: 7.3 K/UL (ref 1.7–7.7)
NEUTROPHILS NFR BLD: 82.1 %
PLATELET # BLD AUTO: 134 K/UL (ref 135–450)
PMV BLD AUTO: 8.1 FL (ref 5–10.5)
POTASSIUM SERPL-SCNC: 3.7 MMOL/L (ref 3.5–5.1)
RBC # BLD AUTO: 4.02 M/UL (ref 4.2–5.9)
SODIUM SERPL-SCNC: 135 MMOL/L (ref 136–145)
WBC # BLD AUTO: 8.9 K/UL (ref 4–11)

## 2023-05-26 PROCEDURE — 6370000000 HC RX 637 (ALT 250 FOR IP): Performed by: SURGERY

## 2023-05-26 PROCEDURE — 85025 COMPLETE CBC W/AUTO DIFF WBC: CPT

## 2023-05-26 PROCEDURE — 36415 COLL VENOUS BLD VENIPUNCTURE: CPT

## 2023-05-26 PROCEDURE — 2580000003 HC RX 258: Performed by: SURGERY

## 2023-05-26 PROCEDURE — 2500000003 HC RX 250 WO HCPCS: Performed by: SURGERY

## 2023-05-26 PROCEDURE — 80048 BASIC METABOLIC PNL TOTAL CA: CPT

## 2023-05-26 PROCEDURE — 6360000002 HC RX W HCPCS: Performed by: INTERNAL MEDICINE

## 2023-05-26 PROCEDURE — 6370000000 HC RX 637 (ALT 250 FOR IP): Performed by: INTERNAL MEDICINE

## 2023-05-26 PROCEDURE — 83735 ASSAY OF MAGNESIUM: CPT

## 2023-05-26 RX ORDER — POTASSIUM CHLORIDE 20 MEQ/1
40 TABLET, EXTENDED RELEASE ORAL PRN
Status: DISCONTINUED | OUTPATIENT
Start: 2023-05-26 | End: 2023-05-26 | Stop reason: HOSPADM

## 2023-05-26 RX ORDER — ASPIRIN 81 MG/1
81 TABLET, CHEWABLE ORAL DAILY
Status: DISCONTINUED | OUTPATIENT
Start: 2023-05-26 | End: 2023-05-26 | Stop reason: HOSPADM

## 2023-05-26 RX ORDER — POTASSIUM CHLORIDE 7.45 MG/ML
10 INJECTION INTRAVENOUS PRN
Status: DISCONTINUED | OUTPATIENT
Start: 2023-05-26 | End: 2023-05-26 | Stop reason: HOSPADM

## 2023-05-26 RX ORDER — OXYCODONE HYDROCHLORIDE 5 MG/1
5 TABLET ORAL EVERY 6 HOURS PRN
Qty: 12 TABLET | Refills: 0 | Status: SHIPPED | OUTPATIENT
Start: 2023-05-26 | End: 2023-05-29

## 2023-05-26 RX ORDER — MAGNESIUM SULFATE 1 G/100ML
1000 INJECTION INTRAVENOUS PRN
Status: DISCONTINUED | OUTPATIENT
Start: 2023-05-26 | End: 2023-05-26 | Stop reason: HOSPADM

## 2023-05-26 RX ORDER — MAGNESIUM OXIDE 400 MG/1
400 TABLET ORAL DAILY
Qty: 60 TABLET | Refills: 1 | Status: SHIPPED | OUTPATIENT
Start: 2023-05-26

## 2023-05-26 RX ORDER — DILTIAZEM HYDROCHLORIDE 180 MG/1
180 CAPSULE, COATED, EXTENDED RELEASE ORAL 2 TIMES DAILY
Qty: 120 CAPSULE | Refills: 2 | Status: SHIPPED | OUTPATIENT
Start: 2023-05-26

## 2023-05-26 RX ADMIN — MAGNESIUM SULFATE HEPTAHYDRATE 1000 MG: 1 INJECTION, SOLUTION INTRAVENOUS at 05:56

## 2023-05-26 RX ADMIN — MAGNESIUM SULFATE HEPTAHYDRATE 1000 MG: 1 INJECTION, SOLUTION INTRAVENOUS at 13:51

## 2023-05-26 RX ADMIN — NITROGLYCERIN 100 MCG/MIN: 20 INJECTION INTRAVENOUS at 09:43

## 2023-05-26 RX ADMIN — Medication 10 ML: at 07:58

## 2023-05-26 RX ADMIN — ATORVASTATIN CALCIUM 80 MG: 80 TABLET, FILM COATED ORAL at 09:39

## 2023-05-26 RX ADMIN — ASPIRIN 81 MG: 81 TABLET, CHEWABLE ORAL at 07:57

## 2023-05-26 RX ADMIN — DILTIAZEM HYDROCHLORIDE 180 MG: 180 CAPSULE, COATED, EXTENDED RELEASE ORAL at 09:37

## 2023-05-26 NOTE — OP NOTE
[address] [address]                                OPERATIVE REPORT    PATIENT NAME: Aj Arreaga                     :        1950  MED REC NO:   [id number]                          ROOM:       8736  ACCOUNT NO:   [de-identified]                           ADMIT DATE: 2023  PROVIDER:     Red Levin MD    DATE OF PROCEDURE:  2023    PREOPERATIVE DIAGNOSIS:  High-grade left carotid stenosis. POSTOPERATIVE DIAGNOSIS:  High-grade left carotid stenosis. PROCEDURE:  Left carotid endarterectomy. ANESTHESIA:  General endotracheal.    SURGEON:  Red Levin MD    INDICATIONS:  The patient is a 75-year-old male who is found to have a  very high-grade left carotid stenosis. He is brought to the operating  room to undergo endarterectomy. PROCEDURE:  The patient was brought to the operating room, placed in  supine position. General endotracheal anesthesia induced. After  adequate anesthesia, the left neck was prepped and draped in sterile  fashion. Incision was made along the medial border of  sternocleidomastoid muscle, carried down through subcutaneous tissues  and platysma using cautery. Common facial vein was ligated and divided  using 2-0 silk ties and 3-0 silk suture ligatures. The jugular vein was  then retracted laterally exposing the carotid artery. The common distal  internal carotid artery and proximal external carotid artery were  dissected and circled with vessel loops. The carotid bifurcation region  was left undisturbed. The patient was given 5000 units of intravenous  heparin. After approximately 3 minutes, the common carotid artery was  clamped and the vessel loop around the external carotid artery was  constricted occluding flow.   The distal common carotid artery was  punctured with a 22-gauge needle connected to a pressure line allowing  measurement of the internal carotid artery

## 2023-05-26 NOTE — PLAN OF CARE
Problem: Chronic Conditions and Co-morbidities  Goal: Patient's chronic conditions and co-morbidity symptoms are monitored and maintained or improved  Outcome: Progressing     Problem: Discharge Planning  Goal: Discharge to home or other facility with appropriate resources  Outcome: Completed

## 2023-05-26 NOTE — PROGRESS NOTES
05/25/23 @ 9780 Vascular Surgery paged per KATHYA Worthy's request
Dakotah Castmc    Age 67 y.o.    male    1950    MRN [id number]    5/25/2023  Arrival Time_____________  OR Time____________145 [address]     Procedure(s):  LEFT CAROTID ENDARTERECTOMY                      General   Surgeon(s):  Daysi Webster, MD      DAY ADMIT ___  SDS/OP ___  OUTPT IN BED ___        Phone [phone] (home) [phone] (work)    PCP _____________________ Phone_________________ [employer] ( ) Epic CE ( ) Appt ________    ADDITIONAL INFO __________________________________ Cardio/Consult _____________    NOTES _____________________________________________________________________    ____________________________________________________________________________    PAT APPT DATE:________ TIME: ________  FAXED QAD: _______  (__) H&P w/ Hospitalist  __________________________________________________________________________  Preop Nurse phone screen complete: _____________  (__) CBC     (__) W/ DIFF ___________     (__) Hgb A1C    ___________  (__) CHEST X RAY   __________  (__) LIPID PROFILE  ___________  (__) EKG   __________  (__) PT/PTT   ___________  (__) PFT's   __________  (__) BMP   ___________  (__) CAROTIDS  __________  (__) CMP   ___________  (__) VEIN MAPPING  __________  (__) U/A   ___________  (__) HISTORY & PHYSICAL __________  (__) URINE C & S  ___________  (__) CARDIAC CLEARANCE __________  (__) U/A W/ FLEX  ___________  (__) PULM.  CLEARANCE __________  (__) SERUM PREGNANCY ___________  (__) Check Epic DOS orders __________  (__) TYPE & SCREEN __________repeat ( ) (__)  __________________ __________  (__) ALBUMIN   ___________  (__)  __________________ __________  (__) TRANSFERRIN  ___________  (__)  __________________ __________  (__) LIVER PROFILE  ___________  (__)  __________________ __________  (__) MRSA NASAL SWAB ___________  (__) URINE PREG DOS __________  (__) SED RATE  ___________  (__) BLOOD SUGAR DOS __________  (__) C-REACTIVE PROTEIN ___________    (__) VITAMIN D
Patient admitted to room 0237. Alert and oriented. Heart rate elevated in Afib. Nitro drip running see mar. Report received from off going PACU nurse. Patient complaint of nausea and vomiting. Vascular contacted get Hospitalist orders. HR continues 150's. Hospital physician notified.
Patient discharged. All education completed and all questions answered. All lines, tubes, drains removed. Patient left with all personal belongings, in a personal car with family member.
Surgery Date and Time: 5/25/23 @ 12:30 pm Arrival Time:  10:30 am      The instructions given regarding when and if a patient needs to stop oral intake prior to surgery varies. Follow the specific instructions you were given by your surgeon or RN during preop call. _X__Nothing to eat or to drink after Midnight the night before the surgery. NO gum, mints, candy or ice chips day of surgery. Aspirin, Ibuprofen, Advil, Naproxen, Vitamin E and other Anti-inflammatory products and supplements should be stopped for 5 -7days before surgery or as directed by your physician. Check with your Surgeon/PCP/Cardiologist regarding stopping Plavix, Medication:     xarelto, aspirin                       Last dose:  5/23 per surgeon's office      Do not smoke, and do not drink any alcoholic beverages 24 hours prior to surgery. This includes NA Beer. Refrain from the usage of any recreational drugs, including non-prescribed prescription drugs. You may brush your teeth and gargle the morning of surgery. DO NOT SWALLOW WATER   You MUST plan for a responsible adult to stay on site while you are here and take you home after your surgery. You will not be allowed to leave alone or drive yourself home. It is strongly suggested someone stay with you the first 24 hrs. Your surgery will be cancelled if you do not have a ride home. A parent/legal guardian must accompany a child scheduled for surgery and plan to stay at the hospital until the child is discharged. Please do not bring other children with you. Please wear simple, loose-fitting clothing to the hospital. Niko Shanks not bring valuables (money, credit cards, checkbooks, etc.) Do not wear any makeup (including no eye makeup) and no nail polish if applicable. DO NOT wear any jewelry or piercings on day of surgery. All body piercing jewelry must be removed.              If you have dentures, they will be removed before going to the OR; we will
Vascular Surgery Progress Note      SUBJECTIVE:  Nausea last night- now resolved.      OBJECTIVE    Physical  CURRENT VITALS:  /75   Pulse 88   Temp 97.8 °F (36.6 °C) (Oral)   Resp 13   Ht 6' (1.829 m)   Wt 207 lb 14.4 oz (94.3 kg)   SpO2 94%   BMI 28.20 kg/m²   24 HR INTAKE/OUTPUT:    Intake/Output Summary (Last 24 hours) at 5/26/2023 0834  Last data filed at 5/26/2023 0505  Gross per 24 hour   Intake 3000 ml   Output 1605 ml   Net 1395 ml     Incision intact  No hematoma  Tongue midline  Neuro grossly normal    Data  CBC:   Lab Results   Component Value Date/Time    WBC 8.9 05/26/2023 04:27 AM    RBC 4.02 05/26/2023 04:27 AM    HGB 14.6 05/26/2023 04:27 AM    HCT 42.3 05/26/2023 04:27 AM    .3 05/26/2023 04:27 AM    MCH 36.3 05/26/2023 04:27 AM    MCHC 34.5 05/26/2023 04:27 AM    RDW 13.6 05/26/2023 04:27 AM     05/26/2023 04:27 AM    MPV 8.1 05/26/2023 04:27 AM       ASSESSMENT AND PLAN    POD #1 CEA   NESS removed  Afib   Restart anticoag today   EP f/u    Ok to discharge later today if ok with EP
are symmetrical and full   Abdomen:  No masses or tenderness  Bowel sounds present  Extremities:   No cyanosis or clubbing   No lower extremity edema   Skin: warm and dry  Neurological:  Alert and oriented  Moves all extremities well  No abnormalities of mood, affect, memory, mentation, or behavior are noted    Data  Echo: 05/15/2023   Summary   Normal left ventricular systolic function with ejection fraction of 55-60%. No regional wall motion abnormalites are seen. Normal left ventricular size with mild concentric left ventricular   hypertrophy. Grade I diastolic dysfunction with normal filling pressure. Mild mitral regurgitation. A bubble study was performed and fails to show evidence of shunting. No tricuspid regurgitation to estimate systolic pulmonary artery pressure   (SPAP). All labs and testing reviewed.   Lab Review     Renal Profile:   Lab Results   Component Value Date/Time    CREATININE 0.7 05/26/2023 04:27 AM    BUN 12 05/26/2023 04:27 AM     05/26/2023 04:27 AM    K 3.7 05/26/2023 04:27 AM    K 3.3 05/16/2023 06:57 AM    CL 99 05/26/2023 04:27 AM    CO2 24 05/26/2023 04:27 AM     CBC:    Lab Results   Component Value Date/Time    WBC 8.9 05/26/2023 04:27 AM    RBC 4.02 05/26/2023 04:27 AM    HGB 14.6 05/26/2023 04:27 AM    HCT 42.3 05/26/2023 04:27 AM    .3 05/26/2023 04:27 AM    RDW 13.6 05/26/2023 04:27 AM     05/26/2023 04:27 AM     BNP:  No results found for: BNP  Fasting Lipid Panel:    Lab Results   Component Value Date/Time    CHOL 176 05/15/2023 06:22 AM    HDL 44 05/15/2023 06:22 AM    TRIG 211 05/15/2023 06:22 AM     Cardiac Enzymes:  CK/MbTroponinNo results found for: CKTOTAL, CKMB, CKMBINDEX, TROPONINI  PT/ INR   Lab Results   Component Value Date/Time    INR 1.27 05/14/2023 06:30 PM    PROTIME 15.8 05/14/2023 06:30 PM     PTT No results found for: PTT   Lab Results   Component Value Date/Time    MG 1.40 05/26/2023 04:27 AM      Lab Results   Component

## 2023-05-26 NOTE — CARE COORDINATION
CASE MANAGEMENT DISCHARGE SUMMARY      Discharge to: Home with spouse    Precertification completed: [address] Exemption Notification (HENS) completed: n/a    IMM given:05/26/23    New Durable Medical Equipment ordered/agency: n/a    Transportation:    Family/car:Private       Confirmed discharge plan with:     Patient: yes     Family:  yes   Name: Lizy Blanc (Spouse)   Contact number:[phone]         RN, name: Christos Brand RN        Note: Discharging nurse to complete CHAUNCEY, reconcile AVS, and place final copy with patient's discharge packet. RN to ensure that written prescriptions for  Level II medications are sent with patient to the facility as per protocol.
Lives with spouse. Has cane at home. The Plan for Transition of Care is related to the following treatment goals of Carotid stenosis, bilateral [I65.23]  Carotid stenosis, left [C83.68]    IF APPLICABLE: The Patient and/or patient representative Marline Arias and his family were provided with a choice of provider and agrees with the discharge plan. Freedom of choice list with basic dialogue that supports the patient's individualized plan of care/goals and shares the quality data associated with the providers was provided to:     Patient Representative Name:       The Patient and/or Patient Representative Agree with the Discharge Plan?       Bogdan Centeno RN  Case Management Department  Ph: [phone] Fax: [phone]

## 2023-05-26 NOTE — DISCHARGE SUMMARY
Physician Discharge Summary     Patient ID:  Scott Andrews  [id number]  27 y.o.  1950    Admit date: 5/25/2023    Discharge date and time: 5/26/2023  3:46 PM     Admitting Physician: Alessia Avila MD     Discharge Physician: same    Admission Diagnoses: Carotid stenosis, bilateral [I65.23]  Carotid stenosis, left [I65.22]    Discharge Diagnoses: same    Admission Condition: good    Discharged Condition: good    Indication for Admission: Admitted to undergo Left Carotid Endarterectomy for high grade stenosis. Hospital Course: Admitted, taken to OR where above performed. Post-op course routine and remarkable only for afib, which was present preoperatively. Cardiology was consulted regarding rate control and meds adjusted. Consults: cardiology    Disposition: home    In process/preliminary results:  Outstanding Order Results       Date and Time Order Name Status Description    5/25/2023 11:36 AM EKG 12 Lead Preliminary             Patient Instructions:   Discharge Medication List as of 5/26/2023  2:58 PM        START taking these medications    Details   oxyCODONE (ROXICODONE) 5 MG immediate release tablet Take 1 tablet by mouth every 6 hours as needed for Pain for up to 3 days. Intended supply: 3 days.  Take lowest dose possible to manage pain Max Daily Amount: 20 mg, Disp-12 tablet, R-0Normal      magnesium oxide (MAG-OX) 400 MG tablet Take 1 tablet by mouth daily, Disp-60 tablet, R-1Normal           CONTINUE these medications which have CHANGED    Details   dilTIAZem (CARDIZEM CD) 180 MG extended release capsule Take 1 capsule by mouth in the morning and at bedtime, Disp-120 capsule, R-2Normal           CONTINUE these medications which have NOT CHANGED    Details   rivaroxaban (XARELTO) 20 MG TABS tablet Take 1 tablet by mouth Daily with supper, Disp-30 tablet, R-1Normal      atorvastatin (LIPITOR) 80 MG tablet Take 1 tablet by mouth nightly, Disp-30 tablet, R-0Normal      aspirin 81 MG EC tablet Take

## 2023-05-26 NOTE — CONSULTS
[facility] Medicine    Patient: Eben Jameson  :  1950  MRN:  [id number]    Date of Service: 23  Requesting: Alan Luis MD  Reason:  Atrial fibrillation with RVR    Chief Complaint  S/p carotid endarterectomy    HISTORY OF PRESENT ILLNESS:    Eben aJmeson is a 67 y.o. male. He is s/p elective left CEA today. He was recently hospitalized for stroke-like symptoms  - . No stroke was found but patient was found to have critical stenosis of the left internal carotid artery. He was also newly found to be in atrial fibrillation. Post-operatively patient has been nauseated, repeatedly vomiting, and markedly tachycardic (in atrial fibrillation). He has been unable to tolerate his usual oral medications for heart rate control (diltiazem). Review of Systems:  All pertinent positives and negatives are as noted in the HPI section. All other systems were reviewed and are negative. Past Medical History:   Diagnosis Date    Atrial fibrillation with rapid ventricular response (HCC)     CAD (coronary artery disease)     CVA (cerebral vascular accident) ([facility] [address]     Hearing loss     right    Hyperlipidemia     Hypertension     TIA (transient ischemic attack)        Past Surgical History:   Procedure Laterality Date    COLONOSCOPY      HERNIA REPAIR      TIBIA FRACTURE SURGERY Left          Prior to Admission medications    Medication Sig Start Date End Date Taking?  Authorizing Provider   rivaroxaban (XARELTO) 20 MG TABS tablet Take 1 tablet by mouth Daily with supper 5/15/23   SMILEY Rocha CNP   atorvastatin (LIPITOR) 80 MG tablet Take 1 tablet by mouth nightly  Patient taking differently: Take 1 tablet by mouth every morning 5/15/23   SMILEY Rocha CNP   dilTIAZem (CARDIZEM CD) 180 MG extended release capsule Take 1 capsule by mouth daily  Patient taking differently: Take 1 capsule by mouth at bedtime 5/15/23   SMILEY Rocha CNP   aspirin 81 MG EC tablet

## 2023-05-26 NOTE — DISCHARGE INSTR - COC
Continuity of Care Form    Patient Name: Dollie Blizzard   :  1950  MRN:  [id number]    Admit date:  2023  Discharge date:  ***    Code Status Order: Full Code   Advance Directives:   Advance Care Flowsheet Documentation       Date/Time Healthcare Directive Type of Healthcare Directive Copy in [address] Agent's Name Healthcare Agent's Phone Number    [phone] No, patient does not have an advance directive for healthcare treatment -- -- -- -- --            Admitting Physician: Broderick Cruz MD  PCP: Claudine Alves MD    Discharging Nurse: [facility] Unit/Room#: 7141/1537-34  Discharging Unit Phone Number: ***    Emergency Contact:   Extended Emergency Contact Information  Primary Emergency Contact: [facility]  Address: [address] Phone: [phone]  Mobile Phone: [phone]  Relation: Spouse    Past Surgical History:  Past Surgical History:   Procedure Laterality Date    CAROTID ENDARTERECTOMY Left 2023    LEFT CAROTID ENDARTERECTOMY performed by Broderick Cruz MD at [facility] [address] Left        Immunization History: There is no immunization history on file for this patient.     Active Problems:  Patient Active Problem List   Diagnosis Code    Atrial fibrillation with rapid ventricular response (HCC) I48.91    Stroke-like symptoms R29.90    Left arm numbness R20.0    Facial droop (left) R29.810    Expressive aphasia R47.01    Elevated blood pressure reading R03.0    Persistent atrial fibrillation (HCC) I48.19    Cerebrovascular accident (CVA) ([facility] [address] I63.9    Primary hypertension I10    Stenosis of left carotid artery I65.22    Carotid stenosis, left I65.22    Typical atrial flutter (HCC) I48.3       Isolation/Infection:   Isolation            No Isolation          Patient Infection Status       None to display

## 2023-06-08 ENCOUNTER — OFFICE VISIT (OUTPATIENT)
Dept: PULMONOLOGY | Age: 73
End: 2023-06-08
Payer: MEDICARE

## 2023-06-08 VITALS
SYSTOLIC BLOOD PRESSURE: 150 MMHG | WEIGHT: 206 LBS | HEART RATE: 97 BPM | RESPIRATION RATE: 16 BRPM | TEMPERATURE: 98 F | OXYGEN SATURATION: 97 % | HEIGHT: 72 IN | BODY MASS INDEX: 27.9 KG/M2 | DIASTOLIC BLOOD PRESSURE: 70 MMHG

## 2023-06-08 DIAGNOSIS — R06.02 SHORTNESS OF BREATH: Primary | ICD-10-CM

## 2023-06-08 PROCEDURE — 1111F DSCHRG MED/CURRENT MED MERGE: CPT | Performed by: STUDENT IN AN ORGANIZED HEALTH CARE EDUCATION/TRAINING PROGRAM

## 2023-06-08 PROCEDURE — 3077F SYST BP >= 140 MM HG: CPT | Performed by: STUDENT IN AN ORGANIZED HEALTH CARE EDUCATION/TRAINING PROGRAM

## 2023-06-08 PROCEDURE — G8427 DOCREV CUR MEDS BY ELIG CLIN: HCPCS | Performed by: STUDENT IN AN ORGANIZED HEALTH CARE EDUCATION/TRAINING PROGRAM

## 2023-06-08 PROCEDURE — 99204 OFFICE O/P NEW MOD 45 MIN: CPT | Performed by: STUDENT IN AN ORGANIZED HEALTH CARE EDUCATION/TRAINING PROGRAM

## 2023-06-08 PROCEDURE — 3078F DIAST BP <80 MM HG: CPT | Performed by: STUDENT IN AN ORGANIZED HEALTH CARE EDUCATION/TRAINING PROGRAM

## 2023-06-08 PROCEDURE — 3017F COLORECTAL CA SCREEN DOC REV: CPT | Performed by: STUDENT IN AN ORGANIZED HEALTH CARE EDUCATION/TRAINING PROGRAM

## 2023-06-08 PROCEDURE — 1036F TOBACCO NON-USER: CPT | Performed by: STUDENT IN AN ORGANIZED HEALTH CARE EDUCATION/TRAINING PROGRAM

## 2023-06-08 PROCEDURE — G8419 CALC BMI OUT NRM PARAM NOF/U: HCPCS | Performed by: STUDENT IN AN ORGANIZED HEALTH CARE EDUCATION/TRAINING PROGRAM

## 2023-06-08 PROCEDURE — 1123F ACP DISCUSS/DSCN MKR DOCD: CPT | Performed by: STUDENT IN AN ORGANIZED HEALTH CARE EDUCATION/TRAINING PROGRAM

## 2023-06-08 ASSESSMENT — SLEEP AND FATIGUE QUESTIONNAIRES
NECK CIRCUMFERENCE (INCHES): 18.5
HOW LIKELY ARE YOU TO NOD OFF OR FALL ASLEEP WHILE SITTING INACTIVE IN A PUBLIC PLACE: 0
ESS TOTAL SCORE: 0
HOW LIKELY ARE YOU TO NOD OFF OR FALL ASLEEP WHEN YOU ARE A PASSENGER IN A CAR FOR AN HOUR WITHOUT A BREAK: 0
HOW LIKELY ARE YOU TO NOD OFF OR FALL ASLEEP WHILE SITTING AND READING: 0
HOW LIKELY ARE YOU TO NOD OFF OR FALL ASLEEP WHILE SITTING QUIETLY AFTER LUNCH WITHOUT ALCOHOL: 0
HOW LIKELY ARE YOU TO NOD OFF OR FALL ASLEEP WHILE LYING DOWN TO REST IN THE AFTERNOON WHEN CIRCUMSTANCES PERMIT: 0
HOW LIKELY ARE YOU TO NOD OFF OR FALL ASLEEP WHILE WATCHING TV: 0
HOW LIKELY ARE YOU TO NOD OFF OR FALL ASLEEP WHILE SITTING AND TALKING TO SOMEONE: 0
HOW LIKELY ARE YOU TO NOD OFF OR FALL ASLEEP IN A CAR, WHILE STOPPED FOR A FEW MINUTES IN TRAFFIC: 0

## 2023-06-08 ASSESSMENT — ENCOUNTER SYMPTOMS
SORE THROAT: 0
EYE DISCHARGE: 0
COLOR CHANGE: 0
ABDOMINAL PAIN: 0
WHEEZING: 0
TROUBLE SWALLOWING: 0
VOMITING: 0
SHORTNESS OF BREATH: 0
EYE PAIN: 0
STRIDOR: 0
ABDOMINAL DISTENTION: 0
BACK PAIN: 0
EYE REDNESS: 0
EYE ITCHING: 0
CONSTIPATION: 0
NAUSEA: 0
COUGH: 0
DIARRHEA: 0

## 2023-06-08 NOTE — PROGRESS NOTES
MA Communication:   The following orders are received by verbal communication from   Tejal Thorne MD    Orders include:  PFT 6 min walk        FU 4 mo same day

## 2023-06-08 NOTE — PROGRESS NOTES
[address] Visit   [address] [facility] [address]  Vera Michaud Chi  [phone]    Chief Complaint/Referring Provider:  Patient is being seen at the request of MICHELE Solis for a consultation for SOB. Presenting HPI:   Patient is a 80-year-old male with significant past medical history of CAD, hyperlipidemia, hypertension, carotid stenosis that presents to [facility] pulmonary clinic for evaluation of shortness of breath. Patient had a recent left carotid endarterectomy for high-grade stenosis. Prior to this procedure, patient was having weakness in his lower extremities and pain which caused him to not walk far. He is an active individual in which he has to walk hills and ambulate often. He does not complain of shortness of breath at rest.  He denies any fever, chills, chest pain, nausea, vomiting, abdominal pain. In the mornings, he has cough with productive clear sputum in which she believes is from his allergies. He has no other complaints. Patient quit smoking 20 to 30 years ago, he was smoking 10 to 15 cigarettes/day. He denies any illicit drug use. He drinks light beer daily, around 4-5 beers. 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 He has 2 dogs and a cat at home, denies any household exposures.       Past Medical History:   Diagnosis Date    Atrial fibrillation with rapid ventricular response (HCC)     CAD (coronary artery disease)     CVA (cerebral vascular accident) ([facility] [address]     Hearing loss     right    Hyperlipidemia     Hypertension     TIA (transient ischemic attack)        Past Surgical History:   Procedure Laterality Date    CAROTID ENDARTERECTOMY Left 5/25/2023    LEFT CAROTID ENDARTERECTOMY performed by Antonio Young MD at [facility] [address] Left        No Known Allergies    Medication listwas reviewed and updated as needed in [facility]    Social History

## 2023-06-08 NOTE — PATIENT INSTRUCTIONS
Remember to bring a list of pulmonary medications and any CPAP or BiPAP machines to your next appointment with the office. Please keep all of your future appointments scheduled by [facility] office. Out of respect for other patients and providers, you may be asked to reschedule your appointment if you arrive later than your scheduled appointment time. Appointments cancelled less than 24hrs in advance will be considered a no show. Patients with three missed appointments within 1 year or four missed appointments within 2 years can be dismissed from the practice. Please be aware that our physicians are required to work in the Intensive Care Unit at [facility].  Your appointment may need to be rescheduled if they are designated to work during your appointment time. You may receive a survey regarding the care you received during your visit. Your input is valuable to us. We encourage you to complete and return your survey. We hope you will choose us in the future for your healthcare needs. Pt instructed of all future appointment dates & times, including radiology, labs, procedures & referrals. If procedures were scheduled preparation instructions provided. Instructions on future appointments with [facility] Pulmonary were given.

## 2023-06-09 ENCOUNTER — OFFICE VISIT (OUTPATIENT)
Dept: VASCULAR SURGERY | Age: 73
End: 2023-06-09

## 2023-06-09 VITALS
DIASTOLIC BLOOD PRESSURE: 72 MMHG | WEIGHT: 206 LBS | BODY MASS INDEX: 27.9 KG/M2 | SYSTOLIC BLOOD PRESSURE: 132 MMHG | HEIGHT: 72 IN

## 2023-06-09 DIAGNOSIS — Z09 POSTOPERATIVE EXAMINATION: Primary | ICD-10-CM

## 2023-06-09 DIAGNOSIS — Z48.812 POSTOP CAROTID ENDARTERECTOMY SURVEILLANCE, ENCOUNTER FOR: ICD-10-CM

## 2023-06-09 NOTE — PROGRESS NOTES
Postop Progress Note    Subjective    Daljit Owen presents to the office for postop follow up. Objective    Vitals:    06/09/23 1043   BP: 132/72     General: alert, cooperative and no distress  Incision: healing well    Assessment  Doing well postoperatively. Plan  1. Continue any current medications  2. Carotid duplex in 6 months.      Electronically signed by Jes Fernandez MD on 6/9/2023 at 10:59 AM

## 2023-07-10 PROCEDURE — 93228 REMOTE 30 DAY ECG REV/REPORT: CPT | Performed by: INTERNAL MEDICINE

## 2023-07-25 DIAGNOSIS — I48.19 PERSISTENT ATRIAL FIBRILLATION (HCC): Primary | ICD-10-CM

## 2023-08-17 ENCOUNTER — TELEPHONE (OUTPATIENT)
Dept: CARDIOLOGY CLINIC | Age: 73
End: 2023-08-17

## 2023-08-17 NOTE — TELEPHONE ENCOUNTER
Please help me generate a letter. Patient at low risk for MACE during a low risk procedure. No further cardiac testing indicated. Ok to hold [facility] however while off [facility] risk for CVA increases however risk benefits favor holding. Please make sure patient aware.

## 2023-08-17 NOTE — TELEPHONE ENCOUNTER
CARDIAC CLEARANCE     What type of procedure are you having? Colonoscopy due to + Cologuard    Which physician is performing your procedure? Dr Whitman Class    When is your procedure scheduled for? 11/17/23    Where are you having this procedure? [facility]    Are you taking Blood Thinners? Yes  If so what?  Xarelto 20 mg    Does the surgeon want you to stop your blood thinner? yes   If so for how long? 1 day prior    Phone Number and Contact Name for Physicians office:  Michalene Sacks  [phone]    Fax number to send information:  [phone]

## 2023-08-21 NOTE — PROGRESS NOTES
[facility]   Electrophysiology Consult Note    Date: 8/22/23  Patient Name: Rosalia Rodriguez  YOB: 1950    Primary Care Physician: Jake Garcia MD    CHIEF COMPLAINT:   Chief Complaint   Patient presents with    Follow-Up from Hospital    Atrial Fibrillation    Other     Atrial flutter      HISTORY OF PRESENT ILLNESS: Rosalia Rodriguez is a 67 y.o. male who presents for evaluation for atrial fibrillation. The patient has a past medical history of coronary artery disease, CVA, hyperlipidemia, hypertension, and atrial fibrillation. He was admitted on 05/25/2023 for a scheduled left carotid endarterectomy. He was found to be in AF with RVR. He was treated with Diltiazem IV. Patient wore a cardiac event monitor from 05/16/2023 to 06/11/2023 which demonstrated predominately AF (87% burden) with an average HR of 93 (). PAC burden 0.58%, PVC burden 1.32%. Today, 08/22/2023, ECG demonstrates AF (95 BPM). He reports feeling constant fatigue. Patient denies current edema, chest pain, shortness of breath, palpitations, dizziness or syncope. Patient is taking all cardiac medications as prescribed and tolerates them well. He denies any recent issues with bleeding or bruising. Past Medical History:   has a past medical history of Atrial fibrillation with rapid ventricular response ([address]), CAD (coronary artery disease), CVA (cerebral vascular accident) ([address]), Hearing loss, Hyperlipidemia, Hypertension, and TIA (transient ischemic attack). Past Surgical History:   has a past surgical history that includes hernia repair; Colonoscopy; Tibia fracture surgery (Left); and Carotid endarterectomy (Left, 5/25/2023). Allergies:  Patient has no known allergies. Social History:   reports that he quit smoking about 21 years ago. His smoking use included cigarettes. He has never used smokeless tobacco. He reports current alcohol use of about 6.0 standard drinks per week.  He reports that he

## 2023-08-22 ENCOUNTER — OFFICE VISIT (OUTPATIENT)
Dept: CARDIOLOGY CLINIC | Age: 73
End: 2023-08-22
Payer: MEDICARE

## 2023-08-22 VITALS
HEIGHT: 72 IN | OXYGEN SATURATION: 98 % | WEIGHT: 206.2 LBS | DIASTOLIC BLOOD PRESSURE: 80 MMHG | BODY MASS INDEX: 27.93 KG/M2 | HEART RATE: 95 BPM | SYSTOLIC BLOOD PRESSURE: 126 MMHG

## 2023-08-22 DIAGNOSIS — I48.3 TYPICAL ATRIAL FLUTTER (HCC): ICD-10-CM

## 2023-08-22 DIAGNOSIS — I48.19 PERSISTENT ATRIAL FIBRILLATION (HCC): ICD-10-CM

## 2023-08-22 DIAGNOSIS — I48.91 ATRIAL FIBRILLATION WITH RAPID VENTRICULAR RESPONSE (HCC): Primary | ICD-10-CM

## 2023-08-22 DIAGNOSIS — Z09 HOSPITAL DISCHARGE FOLLOW-UP: ICD-10-CM

## 2023-08-22 PROCEDURE — 3079F DIAST BP 80-89 MM HG: CPT | Performed by: INTERNAL MEDICINE

## 2023-08-22 PROCEDURE — 99214 OFFICE O/P EST MOD 30 MIN: CPT | Performed by: INTERNAL MEDICINE

## 2023-08-22 PROCEDURE — 1036F TOBACCO NON-USER: CPT | Performed by: INTERNAL MEDICINE

## 2023-08-22 PROCEDURE — 93000 ELECTROCARDIOGRAM COMPLETE: CPT | Performed by: INTERNAL MEDICINE

## 2023-08-22 PROCEDURE — 1111F DSCHRG MED/CURRENT MED MERGE: CPT | Performed by: INTERNAL MEDICINE

## 2023-08-22 PROCEDURE — G8419 CALC BMI OUT NRM PARAM NOF/U: HCPCS | Performed by: INTERNAL MEDICINE

## 2023-08-22 PROCEDURE — G8427 DOCREV CUR MEDS BY ELIG CLIN: HCPCS | Performed by: INTERNAL MEDICINE

## 2023-08-22 PROCEDURE — 3017F COLORECTAL CA SCREEN DOC REV: CPT | Performed by: INTERNAL MEDICINE

## 2023-08-22 PROCEDURE — 1123F ACP DISCUSS/DSCN MKR DOCD: CPT | Performed by: INTERNAL MEDICINE

## 2023-08-22 PROCEDURE — 3074F SYST BP LT 130 MM HG: CPT | Performed by: INTERNAL MEDICINE

## 2023-08-22 RX ORDER — DILTIAZEM HYDROCHLORIDE 240 MG/1
240 CAPSULE, COATED, EXTENDED RELEASE ORAL 2 TIMES DAILY
Qty: 180 CAPSULE | Refills: 2 | Status: SHIPPED | OUTPATIENT
Start: 2023-08-22

## 2023-08-22 NOTE — PATIENT INSTRUCTIONS
RECOMMENDATIONS:  Discussed at length treatment options for AF:   1. Medications to slow heart rate (goal of less than 90 at rest, less than 110 with mild activity)   2. Medications for rhythm control (amiodarone, dronedarone, and dofetilide or sotalol which requires hospital admission for four days). Discussed risks and benefits. 3. Ablation to burn the abnormal electrical activity within the heart. This is not a cure for AF but will help to suppress it. Discussed risks and benefits. 4. Pace and Ablate to put pacemaker in the heart and burn the electrical activity in the heart. Would be dependent upon pacemaker for the rest of your life. This would be a last resort. 5. Cardioversion to electrically shock heart back into normal rhythm. Continue oral anticoagulant to prevent stroke in the presence of AF. Let us know how you would like to proceed. Increase Diltiazem to 240 mg twice daily.   - Please let us know if you heart rate is below 50. Follow up with me in 6 months.

## 2023-10-16 ENCOUNTER — HOSPITAL ENCOUNTER (OUTPATIENT)
Dept: PULMONOLOGY | Age: 73
Discharge: HOME | End: 2023-10-16
Payer: MEDICARE

## 2023-10-16 VITALS — OXYGEN SATURATION: 97 %

## 2023-10-16 DIAGNOSIS — R06.02 SHORTNESS OF BREATH: ICD-10-CM

## 2023-10-16 LAB
DLCO %PRED: 105 %
DLCO PRED: NORMAL
DLCO/VA %PRED: NORMAL
DLCO/VA PRED: NORMAL
DLCO/VA: NORMAL
DLCO: NORMAL
EXPIRATORY TIME-POST: NORMAL
EXPIRATORY TIME: NORMAL
FEF 25-75% %CHNG: NORMAL
FEF 25-75% %PRED-POST: NORMAL
FEF 25-75% %PRED-PRE: NORMAL
FEF 25-75% PRED: NORMAL
FEF 25-75%-POST: NORMAL
FEF 25-75%-PRE: NORMAL
FEV1 %PRED-POST: 98 %
FEV1 %PRED-PRE: 94 %
FEV1 PRED: NORMAL
FEV1-POST: NORMAL
FEV1-PRE: NORMAL
FEV1/FVC %PRED-POST: 97 %
FEV1/FVC %PRED-PRE: 96 %
FEV1/FVC PRED: NORMAL
FEV1/FVC-POST: NORMAL
FEV1/FVC-PRE: NORMAL
FVC %PRED-POST: 100 L
FVC %PRED-PRE: 97 %
FVC PRED: NORMAL
FVC-POST: NORMAL
FVC-PRE: NORMAL
GAW %PRED: NORMAL
GAW PRED: NORMAL
GAW: NORMAL
IC %PRED: NORMAL
IC PRED: NORMAL
IC: NORMAL
MEP: NORMAL
MIP: NORMAL
MVV %PRED-PRE: NORMAL
MVV PRED: NORMAL
MVV-PRE: NORMAL
PEF %PRED-POST: NORMAL
PEF %PRED-PRE: NORMAL
PEF PRED: NORMAL
PEF%CHNG: NORMAL
PEF-POST: NORMAL
PEF-PRE: NORMAL
RAW %PRED: NORMAL
RAW PRED: NORMAL
RAW: NORMAL
RV %PRED: NORMAL
RV PRED: NORMAL
RV: NORMAL
SVC %PRED: NORMAL
SVC PRED: NORMAL
SVC: NORMAL
TLC %PRED: 87 %
TLC PRED: NORMAL
TLC: NORMAL
VA %PRED: NORMAL
VA PRED: NORMAL
VA: NORMAL
VTG %PRED: NORMAL
VTG PRED: NORMAL
VTG: NORMAL

## 2023-10-16 PROCEDURE — 94726 PLETHYSMOGRAPHY LUNG VOLUMES: CPT

## 2023-10-16 PROCEDURE — 94618 PULMONARY STRESS TESTING: CPT

## 2023-10-16 PROCEDURE — 94729 DIFFUSING CAPACITY: CPT

## 2023-10-16 PROCEDURE — 6370000000 HC RX 637 (ALT 250 FOR IP): Performed by: STUDENT IN AN ORGANIZED HEALTH CARE EDUCATION/TRAINING PROGRAM

## 2023-10-16 PROCEDURE — 94060 EVALUATION OF WHEEZING: CPT

## 2023-10-16 RX ORDER — ALBUTEROL SULFATE 90 UG/1
4 AEROSOL, METERED RESPIRATORY (INHALATION) ONCE
Status: COMPLETED | OUTPATIENT
Start: 2023-10-16 | End: 2023-10-16

## 2023-10-16 RX ADMIN — Medication 4 PUFF: at 09:29

## 2023-10-16 ASSESSMENT — PULMONARY FUNCTION TESTS
FVC_PERCENT_PREDICTED_POST: 100
FEV1/FVC_PERCENT_PREDICTED_POST: 97
FVC_PERCENT_PREDICTED_PRE: 97
FEV1_PERCENT_PREDICTED_PRE: 94
FEV1/FVC_PERCENT_PREDICTED_PRE: 96
FEV1_PERCENT_PREDICTED_POST: 98

## 2023-10-17 ENCOUNTER — OFFICE VISIT (OUTPATIENT)
Dept: PULMONOLOGY | Age: 73
End: 2023-10-17
Payer: MEDICARE

## 2023-10-17 VITALS
HEIGHT: 72 IN | DIASTOLIC BLOOD PRESSURE: 75 MMHG | BODY MASS INDEX: 27.74 KG/M2 | RESPIRATION RATE: 16 BRPM | OXYGEN SATURATION: 100 % | WEIGHT: 204.8 LBS | TEMPERATURE: 97.5 F | SYSTOLIC BLOOD PRESSURE: 151 MMHG | HEART RATE: 90 BPM

## 2023-10-17 DIAGNOSIS — R06.02 SHORTNESS OF BREATH: Primary | ICD-10-CM

## 2023-10-17 PROCEDURE — 94060 EVALUATION OF WHEEZING: CPT | Performed by: INTERNAL MEDICINE

## 2023-10-17 PROCEDURE — 99213 OFFICE O/P EST LOW 20 MIN: CPT | Performed by: STUDENT IN AN ORGANIZED HEALTH CARE EDUCATION/TRAINING PROGRAM

## 2023-10-17 PROCEDURE — G8419 CALC BMI OUT NRM PARAM NOF/U: HCPCS | Performed by: STUDENT IN AN ORGANIZED HEALTH CARE EDUCATION/TRAINING PROGRAM

## 2023-10-17 PROCEDURE — 94618 PULMONARY STRESS TESTING: CPT | Performed by: INTERNAL MEDICINE

## 2023-10-17 PROCEDURE — G8427 DOCREV CUR MEDS BY ELIG CLIN: HCPCS | Performed by: STUDENT IN AN ORGANIZED HEALTH CARE EDUCATION/TRAINING PROGRAM

## 2023-10-17 PROCEDURE — 3017F COLORECTAL CA SCREEN DOC REV: CPT | Performed by: STUDENT IN AN ORGANIZED HEALTH CARE EDUCATION/TRAINING PROGRAM

## 2023-10-17 PROCEDURE — 1123F ACP DISCUSS/DSCN MKR DOCD: CPT | Performed by: STUDENT IN AN ORGANIZED HEALTH CARE EDUCATION/TRAINING PROGRAM

## 2023-10-17 PROCEDURE — 1036F TOBACCO NON-USER: CPT | Performed by: STUDENT IN AN ORGANIZED HEALTH CARE EDUCATION/TRAINING PROGRAM

## 2023-10-17 PROCEDURE — G0008 ADMIN INFLUENZA VIRUS VAC: HCPCS | Performed by: STUDENT IN AN ORGANIZED HEALTH CARE EDUCATION/TRAINING PROGRAM

## 2023-10-17 PROCEDURE — 94726 PLETHYSMOGRAPHY LUNG VOLUMES: CPT | Performed by: INTERNAL MEDICINE

## 2023-10-17 PROCEDURE — 3077F SYST BP >= 140 MM HG: CPT | Performed by: STUDENT IN AN ORGANIZED HEALTH CARE EDUCATION/TRAINING PROGRAM

## 2023-10-17 PROCEDURE — G8484 FLU IMMUNIZE NO ADMIN: HCPCS | Performed by: STUDENT IN AN ORGANIZED HEALTH CARE EDUCATION/TRAINING PROGRAM

## 2023-10-17 PROCEDURE — 3078F DIAST BP <80 MM HG: CPT | Performed by: STUDENT IN AN ORGANIZED HEALTH CARE EDUCATION/TRAINING PROGRAM

## 2023-10-17 PROCEDURE — 94729 DIFFUSING CAPACITY: CPT | Performed by: INTERNAL MEDICINE

## 2023-10-17 PROCEDURE — 90694 VACC AIIV4 NO PRSRV 0.5ML IM: CPT | Performed by: STUDENT IN AN ORGANIZED HEALTH CARE EDUCATION/TRAINING PROGRAM

## 2023-10-17 ASSESSMENT — ENCOUNTER SYMPTOMS
EYE ITCHING: 0
ABDOMINAL PAIN: 0
EYE PAIN: 0
SORE THROAT: 0
TROUBLE SWALLOWING: 0
EYE REDNESS: 0
VOMITING: 0
ABDOMINAL DISTENTION: 0
WHEEZING: 0
DIARRHEA: 0
COUGH: 0
COLOR CHANGE: 0
NAUSEA: 0
BACK PAIN: 0
SHORTNESS OF BREATH: 0
EYE DISCHARGE: 0
CONSTIPATION: 0
STRIDOR: 0

## 2023-10-17 NOTE — PROGRESS NOTES
[facility] Follow-[facility]  1 [facility], [address] [facility],[address]  Sebas Mckeon (: 1950 ) is a 68 y.o. male here for an evaluation of   Chief Complaint   Patient presents with    Follow-up     4 mo fu     Results     Pft 6 min walk same day    Shortness of Breath         SUBJECTIVE/OBJECTIVE:    Patient is a 70-year-old male with significant past medical history of CAD, hyperlipidemia, hypertension, carotid stenosis that presents to [facility] pulmonary clinic for follow-up visit. Patient has no complaints at this time. He denies any fever, chills, chest pain, shortness of breath, nausea, vomiting, abdominal pain. He is here for follow-up of pulmonary function test and 6-minute walk test.     Patient quit smoking 20 to 30 years ago, he was smoking 10 to 15 cigarettes/day. He denies any illicit drug use. He drinks light beer daily, around 4-5 beers. 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 He has 2 dogs and a cat at home, denies any household exposures. Review of Systems   Constitutional:  Negative for activity change, appetite change, chills, diaphoresis and fatigue. HENT:  Negative for congestion, sore throat and trouble swallowing. Eyes:  Negative for pain, discharge, redness and itching. Respiratory:  Negative for cough, shortness of breath, wheezing and stridor. Cardiovascular:  Negative for chest pain, palpitations and leg swelling. Gastrointestinal:  Negative for abdominal distention, abdominal pain, constipation, diarrhea, nausea and vomiting. Endocrine: Negative for polydipsia, polyphagia and polyuria. Genitourinary:  Negative for difficulty urinating. Musculoskeletal:  Negative for back pain, myalgias and neck pain. Skin:  Negative for color change. Neurological:  Negative for dizziness, weakness and light-headedness.    Psychiatric/Behavioral:  Negative for agitation and behavioral

## 2023-10-17 NOTE — PROCEDURES
[address] [place] [id number]                               PULMONARY FUNCTION    PATIENT NAME: Severa Kindred                     :        1950  MED REC NO:   [id number]                          ROOM:  ACCOUNT NO:   [de-identified]                           ADMIT DATE: 10/16/2023  PROVIDER:     Nora Al MD    DATE OF PROCEDURE:  10/16/2023    PFT INTERPRETATION    The patient is a 70-year-old male who underwent a PFT for shortness of  breath. Spirometry shows FVC to be 97%, FEV1 to be 94%, FEV1 to FVC  ratio was 96%, FEF 25%-75% was 85%. The patient did not have any  significant postbronchodilator improvement in the large airways. Lung  volume shows total lung capacity was normal.  Air trapping was present. The patient also has decrease in ERV because of body habitus. The  patient's diffusion capacity when adjusted for volume was normal.  Flow  volume loop was normal.    The patient also underwent a 6-minute walk test, which shows baseline  oxygen saturation of 97% on room air at rest with a heart rate of 103,  respiratory rate of 18, dyspnea-modified Arsenio scale of 0.5,  fatigue-modified Arsenio scale of 0.5. The patient did not have any  exertional hypoxemia on this study. The patient had walked 1200 feet. The patient's expected distance was 1755 feet. The patient achieved 68%  of expected distance. The patient did have increase in heart rate,  which went from baseline of 103 to 136 per minute in 4 minutes of  walking. On the basis of this PFT, the patient does not have any  significant obstructive or restrictive lung disease. The patient does  have some air trapping and other changes in the PFT parameters because  of body habitus. The patient does not have any exertional hypoxemia on  suboptimal exercise, but the patient did have increased heart rate with  exercise. Please correlate clinically.         Tyrone Salmeron

## 2023-10-17 NOTE — PROGRESS NOTES
MA Communication:   The following orders are received by verbal communication from   Haim Reid MD    Orders include:  FU PRN       HD flu

## 2023-10-17 NOTE — PATIENT INSTRUCTIONS
Remember to bring a list of pulmonary medications and any CPAP or BiPAP machines to your next appointment with the office. Please keep all of your future appointments scheduled by [facility]. Out of respect for other patients and providers, you may be asked to reschedule your appointment if you arrive later than your scheduled appointment time. Appointments cancelled less than 24hrs in advance will be considered a no show. Patients with three missed appointments within 1 year or four missed appointments within 2 years can be dismissed from the practice. Please be aware that our physicians are required to work in the Intensive Care Unit at [facility].  Your appointment may need to be rescheduled if they are designated to work during your appointment time. You may receive a survey regarding the care you received during your visit. Your input is valuable to us. We encourage you to complete and return your survey. We hope you will choose us in the future for your healthcare needs. Pt instructed of all future appointment dates & times, including radiology, labs, procedures & referrals. If procedures were scheduled preparation instructions provided. Instructions on future appointments with [facility] were given.

## 2024-01-02 DIAGNOSIS — I65.23 BILATERAL CAROTID ARTERY STENOSIS: Primary | ICD-10-CM

## 2024-01-16 ENCOUNTER — HOSPITAL ENCOUNTER (OUTPATIENT)
Dept: VASCULAR LAB | Age: 74
Discharge: HOME | End: 2024-01-16
Payer: MEDICARE

## 2024-01-16 DIAGNOSIS — I65.23 BILATERAL CAROTID ARTERY STENOSIS: ICD-10-CM

## 2024-01-16 PROCEDURE — 93880 EXTRACRANIAL BILAT STUDY: CPT

## 2024-01-17 ENCOUNTER — TELEPHONE (OUTPATIENT)
Dept: VASCULAR SURGERY | Age: 74
End: 2024-01-17

## 2024-01-17 DIAGNOSIS — I65.23 BILATERAL CAROTID ARTERY STENOSIS: Primary | ICD-10-CM

## 2024-02-23 ENCOUNTER — OFFICE VISIT (OUTPATIENT)
Dept: VASCULAR SURGERY | Age: 74
End: 2024-02-23

## 2024-02-23 VITALS
DIASTOLIC BLOOD PRESSURE: 78 MMHG | SYSTOLIC BLOOD PRESSURE: 160 MMHG | WEIGHT: 204 LBS | BODY MASS INDEX: 27.63 KG/M2 | HEIGHT: 72 IN

## 2024-02-23 DIAGNOSIS — I73.9 PVD (PERIPHERAL VASCULAR DISEASE) WITH CLAUDICATION (HCC): Primary | ICD-10-CM

## 2024-02-26 ENCOUNTER — TELEPHONE (OUTPATIENT)
Dept: VASCULAR SURGERY | Age: 74
End: 2024-02-26

## 2024-02-27 ENCOUNTER — HOSPITAL ENCOUNTER (OUTPATIENT)
Dept: CARDIAC CATH/INVASIVE PROCEDURES | Age: 74
Discharge: HOME | End: 2024-02-27
Attending: SURGERY | Admitting: SURGERY
Payer: MEDICARE

## 2024-02-27 VITALS
WEIGHT: 206 LBS | RESPIRATION RATE: 13 BRPM | BODY MASS INDEX: 27.9 KG/M2 | DIASTOLIC BLOOD PRESSURE: 77 MMHG | SYSTOLIC BLOOD PRESSURE: 158 MMHG | HEIGHT: 72 IN | OXYGEN SATURATION: 97 % | HEART RATE: 84 BPM

## 2024-02-27 PROBLEM — I73.9 PVD (PERIPHERAL VASCULAR DISEASE) (HCC): Status: ACTIVE | Noted: 2024-02-27

## 2024-02-27 LAB
ANION GAP SERPL CALCULATED.3IONS-SCNC: 12 MMOL/L (ref 3–16)
BUN SERPL-MCNC: 7 MG/DL (ref 7–20)
CALCIUM SERPL-MCNC: 9.1 MG/DL (ref 8.3–10.6)
CHLORIDE SERPL-SCNC: 101 MMOL/L (ref 99–110)
CO2 SERPL-SCNC: 26 MMOL/L (ref 21–32)
CREAT SERPL-MCNC: 0.7 MG/DL (ref 0.8–1.3)
DEPRECATED RDW RBC AUTO: 14.2 % (ref 12.4–15.4)
GFR SERPLBLD CREATININE-BSD FMLA CKD-EPI: >60 ML/MIN/{1.73_M2}
GLUCOSE SERPL-MCNC: 113 MG/DL (ref 70–99)
HCT VFR BLD AUTO: 48.9 % (ref 40.5–52.5)
HGB BLD-MCNC: 17.1 G/DL (ref 13.5–17.5)
MAGNESIUM SERPL-MCNC: 1.8 MG/DL (ref 1.8–2.4)
MCH RBC QN AUTO: 35.8 PG (ref 26–34)
MCHC RBC AUTO-ENTMCNC: 34.9 G/DL (ref 31–36)
MCV RBC AUTO: 102.7 FL (ref 80–100)
PLATELET # BLD AUTO: 147 K/UL (ref 135–450)
PMV BLD AUTO: 7.9 FL (ref 5–10.5)
POTASSIUM SERPL-SCNC: 3.5 MMOL/L (ref 3.5–5.1)
RBC # BLD AUTO: 4.76 M/UL (ref 4.2–5.9)
SODIUM SERPL-SCNC: 139 MMOL/L (ref 136–145)
WBC # BLD AUTO: 6.7 K/UL (ref 4–11)

## 2024-02-27 PROCEDURE — C2623 CATH, TRANSLUMIN, DRUG-COAT: HCPCS | Performed by: SURGERY

## 2024-02-27 PROCEDURE — 2709999900 HC NON-CHARGEABLE SUPPLY: Performed by: SURGERY

## 2024-02-27 PROCEDURE — 85027 COMPLETE CBC AUTOMATED: CPT

## 2024-02-27 PROCEDURE — 6370000000 HC RX 637 (ALT 250 FOR IP)

## 2024-02-27 PROCEDURE — C1769 GUIDE WIRE: HCPCS | Performed by: SURGERY

## 2024-02-27 PROCEDURE — 83735 ASSAY OF MAGNESIUM: CPT

## 2024-02-27 PROCEDURE — 6360000002 HC RX W HCPCS

## 2024-02-27 PROCEDURE — 75774 ARTERY X-RAY EACH VESSEL: CPT

## 2024-02-27 PROCEDURE — 75716 ARTERY X-RAYS ARMS/LEGS: CPT

## 2024-02-27 PROCEDURE — C1760 CLOSURE DEV, VASC: HCPCS | Performed by: SURGERY

## 2024-02-27 PROCEDURE — C1725 CATH, TRANSLUMIN NON-LASER: HCPCS | Performed by: SURGERY

## 2024-02-27 PROCEDURE — 6360000004 HC RX CONTRAST MEDICATION

## 2024-02-27 PROCEDURE — C9765 REVASC INTRA LITHOTRIP-STENT: HCPCS

## 2024-02-27 PROCEDURE — C1876 STENT, NON-COA/NON-COV W/DEL: HCPCS | Performed by: SURGERY

## 2024-02-27 PROCEDURE — 37228 HC TIB PER TERRITORY PLASTY: CPT

## 2024-02-27 PROCEDURE — 2500000003 HC RX 250 WO HCPCS

## 2024-02-27 PROCEDURE — C1894 INTRO/SHEATH, NON-LASER: HCPCS | Performed by: SURGERY

## 2024-02-27 PROCEDURE — 80048 BASIC METABOLIC PNL TOTAL CA: CPT

## 2024-02-27 PROCEDURE — C1887 CATHETER, GUIDING: HCPCS | Performed by: SURGERY

## 2024-02-27 RX ORDER — FENTANYL CITRATE 50 UG/ML
INJECTION, SOLUTION INTRAMUSCULAR; INTRAVENOUS
Status: COMPLETED | OUTPATIENT
Start: 2024-02-27 | End: 2024-02-27

## 2024-02-27 RX ORDER — CLOPIDOGREL BISULFATE 75 MG/1
75 TABLET ORAL DAILY
Status: DISCONTINUED | OUTPATIENT
Start: 2024-02-28 | End: 2024-02-27 | Stop reason: HOSPADM

## 2024-02-27 RX ORDER — MIDAZOLAM HYDROCHLORIDE 1 MG/ML
INJECTION INTRAMUSCULAR; INTRAVENOUS
Status: COMPLETED | OUTPATIENT
Start: 2024-02-27 | End: 2024-02-27

## 2024-02-27 RX ORDER — CLOPIDOGREL BISULFATE 75 MG/1
75 TABLET ORAL DAILY
Qty: 30 TABLET | Refills: 5 | Status: SHIPPED | OUTPATIENT
Start: 2024-02-28

## 2024-02-27 RX ORDER — ASPIRIN 81 MG/1
81 TABLET, CHEWABLE ORAL ONCE
Status: COMPLETED | OUTPATIENT
Start: 2024-02-27 | End: 2024-02-27

## 2024-02-27 RX ORDER — ASPIRIN 81 MG/1
243 TABLET, CHEWABLE ORAL ONCE
Status: DISCONTINUED | OUTPATIENT
Start: 2024-02-27 | End: 2024-02-27

## 2024-02-27 RX ORDER — CLOPIDOGREL BISULFATE 75 MG/1
150 TABLET ORAL ONCE
Status: COMPLETED | OUTPATIENT
Start: 2024-02-27 | End: 2024-02-27

## 2024-02-27 RX ORDER — HEPARIN SODIUM 1000 [USP'U]/ML
INJECTION, SOLUTION INTRAVENOUS; SUBCUTANEOUS
Status: COMPLETED | OUTPATIENT
Start: 2024-02-27 | End: 2024-02-27

## 2024-02-27 RX ADMIN — CLOPIDOGREL BISULFATE 150 MG: 75 TABLET ORAL at 10:29

## 2024-02-27 RX ADMIN — FENTANYL CITRATE 25 MCG: 50 INJECTION, SOLUTION INTRAMUSCULAR; INTRAVENOUS at 09:11

## 2024-02-27 RX ADMIN — FENTANYL CITRATE 25 MCG: 50 INJECTION, SOLUTION INTRAMUSCULAR; INTRAVENOUS at 08:44

## 2024-02-27 RX ADMIN — HEPARIN SODIUM 5000 UNITS: 1000 INJECTION, SOLUTION INTRAVENOUS; SUBCUTANEOUS at 08:17

## 2024-02-27 RX ADMIN — FENTANYL CITRATE 25 MCG: 50 INJECTION, SOLUTION INTRAMUSCULAR; INTRAVENOUS at 09:45

## 2024-02-27 RX ADMIN — FENTANYL CITRATE 25 MCG: 50 INJECTION, SOLUTION INTRAMUSCULAR; INTRAVENOUS at 07:59

## 2024-02-27 RX ADMIN — MIDAZOLAM HYDROCHLORIDE 1 MG: 1 INJECTION INTRAMUSCULAR; INTRAVENOUS at 08:30

## 2024-02-27 RX ADMIN — MIDAZOLAM HYDROCHLORIDE 1 MG: 1 INJECTION INTRAMUSCULAR; INTRAVENOUS at 07:59

## 2024-02-27 RX ADMIN — MIDAZOLAM HYDROCHLORIDE 1 MG: 1 INJECTION INTRAMUSCULAR; INTRAVENOUS at 07:39

## 2024-02-27 RX ADMIN — ASPIRIN 81 MG: 81 TABLET, CHEWABLE ORAL at 06:59

## 2024-02-27 RX ADMIN — MIDAZOLAM HYDROCHLORIDE 0.5 MG: 1 INJECTION INTRAMUSCULAR; INTRAVENOUS at 09:01

## 2024-02-27 RX ADMIN — FENTANYL CITRATE 25 MCG: 50 INJECTION, SOLUTION INTRAMUSCULAR; INTRAVENOUS at 09:01

## 2024-02-27 RX ADMIN — MIDAZOLAM HYDROCHLORIDE 0.5 MG: 1 INJECTION INTRAMUSCULAR; INTRAVENOUS at 08:44

## 2024-02-27 RX ADMIN — HEPARIN SODIUM 1000 UNITS: 1000 INJECTION, SOLUTION INTRAVENOUS; SUBCUTANEOUS at 09:36

## 2024-02-27 RX ADMIN — FENTANYL CITRATE 25 MCG: 50 INJECTION, SOLUTION INTRAMUSCULAR; INTRAVENOUS at 09:23

## 2024-02-27 RX ADMIN — FENTANYL CITRATE 25 MCG: 50 INJECTION, SOLUTION INTRAMUSCULAR; INTRAVENOUS at 08:18

## 2024-02-27 RX ADMIN — FENTANYL CITRATE 25 MCG: 50 INJECTION, SOLUTION INTRAMUSCULAR; INTRAVENOUS at 08:29

## 2024-02-27 NOTE — PROGRESS NOTES
Patient/family given discharge instructions. Patient/family verbalize understanding of discharge instructions, all questions addressed, copy given to patient/family. Pt transferred to vehicle via wheelchair to be discharged home with family.    Site looks unremarkable.

## 2024-02-27 NOTE — DISCHARGE INSTRUCTIONS
Cath Lab at  [facility]    Discharge Instructions        2/27/2024  Jamal Hutchison   Date of Birth 1950       Activity:  No driving for 24 hours.  No Tub baths, swimming or hot tubs for 5 days.  In 24 hours you may remove dressing and shower.  Wash site with soap and water and leave open to air.  No lotions, powders or ointments near site for 5 days.   No lifting over 5 pounds for 5 days.  Return to work/school in 5 days unless instructed otherwise by your doctor.    Diet:  Resume previous diet unless instructed otherwise by your doctor, if so general guidelines for a cardiac diet will be provided to you.   Drink extra non-alcoholic/decaffienated fluids for first 24 hours after your procedure.    Groin Management:  If you have stairs to walk up, pretend you have a cast on the affected leg walk up them without bending affected leg.  When you go home go to the bed or sofa, do not get up except to go to the bathroom.  Avoid strenuous activity for 5 days. For example, lifting heavy objects greater than 10 lbs, bicycling, jogging, climbing stairs, or walking long distances.  If bleeding occurs from the site or a hematoma (lump), begins to increase in size, apply pressure directly over the site and call 911 to return to the hospital.     Special Instructions:  Report any coolness or numbness in the leg where the cath was done to the MD or call 911  Report any chills, fever, itching, red bumps or rash.  Report any of the following to the MD: drainage from the cath site, redness and/or swelling at the site, increased tenderness at the site.  If you are currently taking Metformin or Metformin combination medications for Diabetes, hold your dose for 48 hours after your procedure.      Sedation Discharge Instructions:  For the next 24 hours do not drive a car, operate machinery, power tools or kitchen appliances.  Do not drink alcohol; including beer or wine.  Do not make any important decisions or sign any

## 2024-02-28 NOTE — OP NOTE
[address] [id number]                            OPERATIVE REPORT      PATIENT NAME: TACHO MONSON               : 1950  MED REC NO: [id number]                      ROOM:   ACCOUNT NO: [id number]                       ADMIT DATE: 2024  PROVIDER: Nathan Castaneda MD      DATE OF PROCEDURE:  2024    SURGEON:  Nathan Castaneda MD    PREOPERATIVE DIAGNOSIS:  Peripheral vascular disease with severe bilateral lower extremity claudication, worse on the left than the right.    POSTOPERATIVE DIAGNOSIS:  Peripheral vascular disease with severe bilateral lower extremity claudication, worse on the left than the right.    PROCEDURES PERFORMED:    1. Ultrasound-guided right femoral artery access.  2. Bilateral lower extremity angiograms.  3. Angioplasty and stenting of the distal left superficial femoral artery.  4. Popliteal angioplasty.  5. Angioplasty of the tibioperoneal trunk.    ANESTHESIA:  Local with moderate monitored sedation.    INDICATIONS:  The patient is a 73-year-old male with very severe limiting left lower extremity claudication and to a lesser extent right lower extremity claudication.  Noninvasive studies suggested bilateral superficial femoral artery occlusive disease.  The patient is brought to the angio suite at this time to undergo angiography with possible intervention.    DESCRIPTION OF PROCEDURE:  The patient was brought to the angio suite, placed in a supine position.  Under my direct supervision, Versed and fentanyl were administered for moderate sedation.  The patient was monitored by an independent trained nurse observer using continuous blood pressure, EKG, and pulse oximetry.  I spent approximately 150 minutes face-to-face with the patient providing and directing sedation.  Ultrasound was used to identify the right common femoral artery; this did have some posterior plaque distally, but proximally, the

## 2024-03-01 ENCOUNTER — OFFICE VISIT (OUTPATIENT)
Dept: CARDIOLOGY CLINIC | Age: 74
End: 2024-03-01
Payer: MEDICARE

## 2024-03-01 VITALS
SYSTOLIC BLOOD PRESSURE: 138 MMHG | WEIGHT: 209.2 LBS | OXYGEN SATURATION: 98 % | HEIGHT: 72 IN | DIASTOLIC BLOOD PRESSURE: 78 MMHG | BODY MASS INDEX: 28.33 KG/M2 | HEART RATE: 98 BPM

## 2024-03-01 DIAGNOSIS — I48.19 PERSISTENT ATRIAL FIBRILLATION (HCC): Primary | ICD-10-CM

## 2024-03-01 PROCEDURE — 99214 OFFICE O/P EST MOD 30 MIN: CPT

## 2024-03-01 PROCEDURE — G8419 CALC BMI OUT NRM PARAM NOF/U: HCPCS

## 2024-03-01 PROCEDURE — G8484 FLU IMMUNIZE NO ADMIN: HCPCS

## 2024-03-01 PROCEDURE — 3017F COLORECTAL CA SCREEN DOC REV: CPT

## 2024-03-01 PROCEDURE — G8427 DOCREV CUR MEDS BY ELIG CLIN: HCPCS

## 2024-03-01 PROCEDURE — 1123F ACP DISCUSS/DSCN MKR DOCD: CPT

## 2024-03-01 PROCEDURE — 3075F SYST BP GE 130 - 139MM HG: CPT

## 2024-03-01 PROCEDURE — 1036F TOBACCO NON-USER: CPT

## 2024-03-01 PROCEDURE — 3078F DIAST BP <80 MM HG: CPT

## 2024-03-01 RX ORDER — PANTOPRAZOLE SODIUM 40 MG/1
40 TABLET, DELAYED RELEASE ORAL
Qty: 90 TABLET | Refills: 1 | Status: SHIPPED | OUTPATIENT
Start: 2024-03-01

## 2024-03-01 NOTE — PATIENT INSTRUCTIONS
1. Restart Diltiazem 240 mg twice daily  2.  Continue Xarelto 20 mg daily for stroke risk reduction  3.  Continue Plavix 75 mg daily  4.  Start Lipitor 40 mg daily  5. Start Protonix 40 mg daily; Gas X as needed  6. Check BP and HR daily--call with updates  7. Decrease alcohol intake per day    Follow up in 3 months Tiffanie MORROW

## 2024-03-01 NOTE — PROGRESS NOTES
correlation is recommended, particularly when  comparing to results calculated using previous equations.  The CKD-EPI equation is less accurate in patients with  extremes of muscle mass, extra-renal metabolism of  creatinine, excessive creatinine ingestion, or following  therapy that affects renal tubular secretion.     05/26/2023 >60 >60 Final     Comment:     Pediatric calculator link  https://www.kidney.org/professionals/kdoqi/gfr_calculatorped  Effective Oct 3, 2022  These results are not intended for use in patients  <18 years of age.  eGFR results are calculated without  a race factor using the 2021 CKD-EPI equation.  Careful  clinical correlation is recommended, particularly when  comparing to results calculated using previous equations.  The CKD-EPI equation is less accurate in patients with  extremes of muscle mass, extra-renal metabolism of  creatinine, excessive creatinine ingestion, or following  therapy that affects renal tubular secretion.     05/25/2023 >60 >60 Final     Comment:     Pediatric calculator link  https://www.kidney.org/professionals/kdoqi/gfr_calculatorped  Effective Oct 3, 2022  These results are not intended for use in patients  <18 years of age.  eGFR results are calculated without  a race factor using the 2021 CKD-EPI equation.  Careful  clinical correlation is recommended, particularly when  comparing to results calculated using previous equations.  The CKD-EPI equation is less accurate in patients with  extremes of muscle mass, extra-renal metabolism of  creatinine, excessive creatinine ingestion, or following  therapy that affects renal tubular secretion.       Glucose   Date Value Ref Range Status   02/27/2024 113 (H) 70 - 99 mg/dL Final   05/26/2023 208 (H) 70 - 99 mg/dL Final   05/25/2023 122 (H) 70 - 99 mg/dL Final     PT/INR:   Protime   Date Value Ref Range Status   05/14/2023 15.8 (H) 11.5 - 14.8 sec Final     Comment:     Effective 3-28-23 09:00am EST  Please note

## 2024-03-22 ENCOUNTER — OFFICE VISIT (OUTPATIENT)
Dept: VASCULAR SURGERY | Age: 74
End: 2024-03-22

## 2024-03-22 VITALS
DIASTOLIC BLOOD PRESSURE: 60 MMHG | SYSTOLIC BLOOD PRESSURE: 138 MMHG | WEIGHT: 209 LBS | HEIGHT: 72 IN | BODY MASS INDEX: 28.31 KG/M2

## 2024-03-22 DIAGNOSIS — Z09 POSTOPERATIVE EXAMINATION: Primary | ICD-10-CM

## 2024-03-22 PROCEDURE — 99024 POSTOP FOLLOW-UP VISIT: CPT | Performed by: SURGERY

## 2024-03-22 NOTE — PROGRESS NOTES
Postop Progress Note    Subjective    Jamal W Scales presents to the office for postop follow up.  Very happy can walk to mailbox now without stopping.  Doty some cramping in RLE but not disabling.      Objective    Vitals:    03/22/24 1012   BP: 138/60     General: alert, cooperative and no distress  Feet warm       Assessment  Doing well postoperatively s/p LLE angio and intervention.   LLE disease does not need to be treated unless symptoms worsen.      Plan  Encouraged daily walking.   F/u prn    Electronically signed by Nathan Castaneda MD on 3/22/2024 at 10:31 AM  
4 = No assist / stand by assistance

## 2024-05-24 ENCOUNTER — OFFICE VISIT (OUTPATIENT)
Dept: CARDIOLOGY CLINIC | Age: 74
End: 2024-05-24
Payer: MEDICARE

## 2024-05-24 VITALS
HEIGHT: 72 IN | HEART RATE: 79 BPM | SYSTOLIC BLOOD PRESSURE: 154 MMHG | OXYGEN SATURATION: 99 % | BODY MASS INDEX: 27.66 KG/M2 | DIASTOLIC BLOOD PRESSURE: 66 MMHG | WEIGHT: 204.2 LBS

## 2024-05-24 DIAGNOSIS — I48.91 ATRIAL FIBRILLATION WITH RAPID VENTRICULAR RESPONSE (HCC): Primary | ICD-10-CM

## 2024-05-24 DIAGNOSIS — I48.3 TYPICAL ATRIAL FLUTTER (HCC): ICD-10-CM

## 2024-05-24 PROCEDURE — 93000 ELECTROCARDIOGRAM COMPLETE: CPT

## 2024-05-24 PROCEDURE — 3017F COLORECTAL CA SCREEN DOC REV: CPT

## 2024-05-24 PROCEDURE — G8427 DOCREV CUR MEDS BY ELIG CLIN: HCPCS

## 2024-05-24 PROCEDURE — 1123F ACP DISCUSS/DSCN MKR DOCD: CPT

## 2024-05-24 PROCEDURE — 1036F TOBACCO NON-USER: CPT

## 2024-05-24 PROCEDURE — 99214 OFFICE O/P EST MOD 30 MIN: CPT

## 2024-05-24 PROCEDURE — 3077F SYST BP >= 140 MM HG: CPT

## 2024-05-24 PROCEDURE — G8419 CALC BMI OUT NRM PARAM NOF/U: HCPCS

## 2024-05-24 PROCEDURE — 3078F DIAST BP <80 MM HG: CPT

## 2024-05-24 RX ORDER — PANTOPRAZOLE SODIUM 40 MG/1
40 TABLET, DELAYED RELEASE ORAL 2 TIMES DAILY
Qty: 90 TABLET | Refills: 1 | Status: SHIPPED | OUTPATIENT
Start: 2024-05-24

## 2024-05-24 NOTE — PATIENT INSTRUCTIONS
1 Continue Diltiazem 240 mg twice daily  2.  Continue Xarelto 20 mg daily for stroke risk reduction  3.  Continue Plavix 75 mg daily  4.  Continue Lipitor 40 mg daily  5.  Continue Protonix 40 mg twice daily  6. Fiber tablets daily--Metamucil or Citrucel   6. Check BP and HR daily --call with any abnormal readings  7. Decrease alcohol intake per day    Neurology--Dr Emmanuelle Pelletier    Follow up in 6 months with Dr Murry

## 2024-05-24 NOTE — PROGRESS NOTES
Comment:     Pediatric calculator link  https://www.kidney.org/professionals/kdoqi/gfr_calculatorped  Effective Oct 3, 2022  These results are not intended for use in patients  <18 years of age.  eGFR results are calculated without  a race factor using the 2021 CKD-EPI equation.  Careful  clinical correlation is recommended, particularly when  comparing to results calculated using previous equations.  The CKD-EPI equation is less accurate in patients with  extremes of muscle mass, extra-renal metabolism of  creatinine, excessive creatinine ingestion, or following  therapy that affects renal tubular secretion.     05/25/2023 >60 >60 Final     Comment:     Pediatric calculator link  https://www.kidney.org/professionals/kdoqi/gfr_calculatorped  Effective Oct 3, 2022  These results are not intended for use in patients  <18 years of age.  eGFR results are calculated without  a race factor using the 2021 CKD-EPI equation.  Careful  clinical correlation is recommended, particularly when  comparing to results calculated using previous equations.  The CKD-EPI equation is less accurate in patients with  extremes of muscle mass, extra-renal metabolism of  creatinine, excessive creatinine ingestion, or following  therapy that affects renal tubular secretion.       Glucose   Date Value Ref Range Status   02/27/2024 113 (H) 70 - 99 mg/dL Final   05/26/2023 208 (H) 70 - 99 mg/dL Final   05/25/2023 122 (H) 70 - 99 mg/dL Final     PT/INR:   Protime   Date Value Ref Range Status   05/14/2023 15.8 (H) 11.5 - 14.8 sec Final     Comment:     Effective 3-28-23 09:00am EST  Please note reference ranges have  changed for PT and INR Testing.       INR   Date Value Ref Range Status   05/14/2023 1.27 (H) 0.84 - 1.16 Final     Comment:     Effective 3/28/23 at 09:00am EST    Normal: 0.84 - 1.16  Therapeutic: 2.0 - 3.0  Pros. Valve: 2.5 - 3.5  AMI: 2.0 - 3.0       APTT: No components found for: \"PT2T\"  FASTING LIPID PANEL:   Lab Results

## 2024-06-24 RX ORDER — CLOPIDOGREL BISULFATE 75 MG/1
75 TABLET ORAL DAILY
Qty: 90 TABLET | Refills: 1 | Status: SHIPPED | OUTPATIENT
Start: 2024-06-24

## 2024-07-08 RX ORDER — PANTOPRAZOLE SODIUM 40 MG/1
40 TABLET, DELAYED RELEASE ORAL 2 TIMES DAILY
Qty: 180 TABLET | Refills: 3 | Status: SHIPPED | OUTPATIENT
Start: 2024-07-08

## 2024-07-29 DIAGNOSIS — I48.91 ATRIAL FIBRILLATION WITH RAPID VENTRICULAR RESPONSE (HCC): ICD-10-CM

## 2024-07-29 RX ORDER — DILTIAZEM HYDROCHLORIDE 240 MG/1
CAPSULE, COATED, EXTENDED RELEASE ORAL
Qty: 180 CAPSULE | Refills: 3 | Status: SHIPPED | OUTPATIENT
Start: 2024-07-29

## 2024-11-26 ENCOUNTER — OFFICE VISIT (OUTPATIENT)
Dept: CARDIOLOGY CLINIC | Age: 74
End: 2024-11-26

## 2024-11-26 ENCOUNTER — TELEPHONE (OUTPATIENT)
Dept: VASCULAR SURGERY | Age: 74
End: 2024-11-26

## 2024-11-26 ENCOUNTER — TELEPHONE (OUTPATIENT)
Dept: CARDIOLOGY CLINIC | Age: 74
End: 2024-11-26

## 2024-11-26 VITALS
HEART RATE: 95 BPM | BODY MASS INDEX: 26.77 KG/M2 | DIASTOLIC BLOOD PRESSURE: 72 MMHG | HEIGHT: 72 IN | SYSTOLIC BLOOD PRESSURE: 138 MMHG | WEIGHT: 197.63 LBS | OXYGEN SATURATION: 97 %

## 2024-11-26 DIAGNOSIS — I48.19 PERSISTENT ATRIAL FIBRILLATION (HCC): ICD-10-CM

## 2024-11-26 DIAGNOSIS — R94.31 ABNORMAL ECG: ICD-10-CM

## 2024-11-26 DIAGNOSIS — I48.91 ATRIAL FIBRILLATION WITH RAPID VENTRICULAR RESPONSE (HCC): Primary | ICD-10-CM

## 2024-11-26 DIAGNOSIS — I48.3 TYPICAL ATRIAL FLUTTER (HCC): ICD-10-CM

## 2024-11-26 NOTE — PATIENT INSTRUCTIONS
RECOMMENDATIONS:  Revisited options for AF:   -Discussed risks and benefits of AF ablation. We would plan on starting on amiodarone post operatively.  -Also discussed Watchman as an option of coming off of blood thinners.   2. Limited echo to assess LA size and LV function prior to AF ablation.  3. Stop Xarelto.   4. Start Eliquis 5mg twice daily   5. Follow up after procedure.     Your provider has ordered testing for further evaluation.  An order/prescription has been included in your paper work.   To schedule outpatient testing, contact Central Scheduling by calling [address] [place] [address] ([phone]).

## 2024-11-26 NOTE — PROGRESS NOTES
and also restart his Lipitor at 40 mg daily.  His wife will check his blood pressure and heart rate daily.  She will call with any consistent abnormal readings.  We also discussed possible atrial fibrillation in the future.   At 5/2024 OV, patient stated he feels tired.  He is frustrated that he cannot get out and walk due to all the rain we have had over the last 2 months.  Patient is wondering when he is going to start to feel back to normal.  Patient stated he has not felt the same since he had his stroke last May 2023.  He never had any follow-up with neurology after stroke. Patient continues to drink 7-10 beers per day.   Today, ECG demonstrates AF 95 BPM w/ occasional PVCs. He reports he is not feeling well. He has been having loose bowel movements about once per house and groin pain following angioplasty of femoral artery with Dr. Castaneda. He believes this may be related to taking plavix. He reports he continues to drink 4-5 beers per day. Patient would like to try taking Eliquis instead of Xarelto. Patient denies current edema, chest pain, shortness of breath, palpitations, dizziness or syncope.     Past Medical History:   has a past medical history of Atrial fibrillation with rapid ventricular response (HCC), CAD (coronary artery disease), CVA (cerebral vascular accident) (HCC), Hearing loss, Hyperlipidemia, Hypertension, and TIA (transient ischemic attack).    Past Surgical History:   has a past surgical history that includes hernia repair; Colonoscopy; Tibia fracture surgery (Left); and Carotid endarterectomy (Left, 5/25/2023).     Allergies:  Patient has no known allergies.    Social History:   reports that he quit smoking about 22 years ago. His smoking use included cigarettes. He has never used smokeless tobacco. He reports current alcohol use of about 6.0 standard drinks of alcohol per week. He reports that he does not use drugs.     Family History: family history is not on file.    Home Medications:  
Home

## 2024-11-26 NOTE — TELEPHONE ENCOUNTER
Called patient as he is having some issues with possibly being on the plavix. If so he can stop Plavix and just be on an aspirin. Also if having groin pain/need arterial duplex per Dr Castaneda. Left him a message to call me. Pamritu

## 2024-11-26 NOTE — TELEPHONE ENCOUNTER
Spouse returned my call regarding patient being on Plavix and diarrhea and gastro intestional discomfort from gas. Patient felt from Plavix so Dr Castaneda said stop it and go on baby aspirin. If does not subside then should see his gastro doctor. pamritu

## 2024-11-26 NOTE — TELEPHONE ENCOUNTER
Procedure:  Afib Ablation  Doctor:  Dr. Murry  Date:  12/4/24  Time:  8:30am  Arrival:  7am  Reps:  Carto  Anesthesia:  Yes    Echo 12/3      Spoke with patient's wife. Please have patient arrive to the main entrance of [facility] ([address] [place] [id number]) and check in with the registration desk.  They will be directed to the Cath Lab.  Remind patient to be NPO after midnight (8 hours prior). Do not apply lotions/creams on skin the day of procedure.    [facility]

## 2024-11-26 NOTE — TELEPHONE ENCOUNTER
Patient was seen in office with Dr. Murry 11/26/2024. Patient reports he is experiencing groin pain and frequent diarrhea \"every hour\" since his angioplasty with you 2/2024. He feels this may be related to his plavix.

## 2024-11-26 NOTE — TELEPHONE ENCOUNTER
Case request created for RFCA for AF. Please call patient to schedule.     *echo needs to be completed prior to ablation*    Medication instructions:   HOLD eliquis the morning of procedure.   Do not take OTC medications including magnesium and multivitamin the morning of procedure.

## 2024-12-03 ENCOUNTER — HOSPITAL ENCOUNTER (OUTPATIENT)
Dept: CARDIOLOGY | Age: 74
Discharge: HOME | End: 2024-12-05
Attending: INTERNAL MEDICINE
Payer: MEDICARE

## 2024-12-03 VITALS
HEIGHT: 72 IN | WEIGHT: 197 LBS | SYSTOLIC BLOOD PRESSURE: 138 MMHG | DIASTOLIC BLOOD PRESSURE: 72 MMHG | BODY MASS INDEX: 26.68 KG/M2

## 2024-12-03 DIAGNOSIS — R94.31 ABNORMAL ECG: ICD-10-CM

## 2024-12-03 LAB
ECHO AO ASC DIAM: 3.5 CM
ECHO AO ASCENDING AORTA INDEX: 1.65 CM/M2
ECHO AO ROOT DIAM: 3.2 CM
ECHO AO ROOT INDEX: 1.51 CM/M2
ECHO BSA: 2.13 M2
ECHO LA AREA 2C: 26.7 CM2
ECHO LA AREA 4C: 28 CM2
ECHO LA DIAMETER INDEX: 1.93 CM/M2
ECHO LA DIAMETER: 4.1 CM
ECHO LA MAJOR AXIS: 6.2 CM
ECHO LA MINOR AXIS: 6.1 CM
ECHO LA TO AORTIC ROOT RATIO: 1.28
ECHO LA VOL BP: 99 ML (ref 18–58)
ECHO LA VOL MOD A2C: 95 ML (ref 18–58)
ECHO LA VOL MOD A4C: 102 ML (ref 18–58)
ECHO LA VOL/BSA BIPLANE: 47 ML/M2 (ref 16–34)
ECHO LA VOLUME INDEX MOD A2C: 45 ML/M2 (ref 16–34)
ECHO LA VOLUME INDEX MOD A4C: 48 ML/M2 (ref 16–34)
ECHO LV EDV 3D: 152 ML
ECHO LV EDV INDEX 3D: 72 ML/M2
ECHO LV EF PHYSICIAN: 55 %
ECHO LV EJECTION FRACTION 3D: 57 %
ECHO LV ESV 3D: 66 ML
ECHO LV ESV INDEX 3D: 31 ML/M2
ECHO LV FRACTIONAL SHORTENING: 42 % (ref 28–44)
ECHO LV GLOBAL LONGITUDINAL STRAIN (GLS): -16.2 %
ECHO LV GLOBAL LONGITUDINAL STRAIN (GLS): -19.2 %
ECHO LV GLOBAL LONGITUDINAL STRAIN (GLS): -21.9 %
ECHO LV GLOBAL LONGITUDINAL STRAIN (GLS): -7.4 %
ECHO LV INTERNAL DIMENSION DIASTOLE INDEX: 2.78 CM/M2
ECHO LV INTERNAL DIMENSION DIASTOLIC: 5.9 CM (ref 4.2–5.9)
ECHO LV INTERNAL DIMENSION SYSTOLIC INDEX: 1.6 CM/M2
ECHO LV INTERNAL DIMENSION SYSTOLIC: 3.4 CM
ECHO LV IVSD: 0.9 CM (ref 0.6–1)
ECHO LV MASS 2D: 209.6 G (ref 88–224)
ECHO LV MASS 3D INDEX: 65.1 G/M2
ECHO LV MASS 3D: 138 G
ECHO LV MASS INDEX 2D: 98.8 G/M2 (ref 49–115)
ECHO LV POSTERIOR WALL DIASTOLIC: 0.9 CM (ref 0.6–1)
ECHO LV RELATIVE WALL THICKNESS RATIO: 0.31
ECHO RA AREA 4C: 15.2 CM2
ECHO RA END SYSTOLIC VOLUME APICAL 4 CHAMBER INDEX BSA: 16 ML/M2
ECHO RA VOLUME: 34 ML
ECHO RV FREE WALL PEAK S': 20.9 CM/S
ECHO RV TAPSE: 2.2 CM (ref 1.7–?)
ECHO TV REGURGITANT MAX VELOCITY: 2.54 M/S
ECHO TV REGURGITANT PEAK GRADIENT: 26 MMHG
ECHO TV REGURGITANT PEAK GRADIENT: 26 MMHG

## 2024-12-03 PROCEDURE — 93321 DOPPLER ECHO F-UP/LMTD STD: CPT | Performed by: INTERNAL MEDICINE

## 2024-12-03 PROCEDURE — 93356 MYOCRD STRAIN IMG SPCKL TRCK: CPT | Performed by: INTERNAL MEDICINE

## 2024-12-03 PROCEDURE — 93308 TTE F-UP OR LMTD: CPT | Performed by: INTERNAL MEDICINE

## 2024-12-03 PROCEDURE — 93325 DOPPLER ECHO COLOR FLOW MAPG: CPT | Performed by: INTERNAL MEDICINE

## 2024-12-03 PROCEDURE — 93308 TTE F-UP OR LMTD: CPT

## 2024-12-04 ENCOUNTER — HOSPITAL ENCOUNTER (OUTPATIENT)
Age: 74
Setting detail: OBSERVATION
Discharge: HOME | End: 2024-12-05
Attending: INTERNAL MEDICINE | Admitting: INTERNAL MEDICINE
Payer: MEDICARE

## 2024-12-04 ENCOUNTER — ANESTHESIA (OUTPATIENT)
Dept: CARDIAC CATH/INVASIVE PROCEDURES | Age: 74
End: 2024-12-04
Payer: MEDICARE

## 2024-12-04 ENCOUNTER — APPOINTMENT (OUTPATIENT)
Dept: CT IMAGING | Age: 74
End: 2024-12-04
Attending: INTERNAL MEDICINE
Payer: MEDICARE

## 2024-12-04 ENCOUNTER — ANESTHESIA EVENT (OUTPATIENT)
Dept: CARDIAC CATH/INVASIVE PROCEDURES | Age: 74
End: 2024-12-04
Payer: MEDICARE

## 2024-12-04 DIAGNOSIS — I48.91 A-FIB (HCC): ICD-10-CM

## 2024-12-04 DIAGNOSIS — K74.60 HEPATIC CIRRHOSIS, UNSPECIFIED HEPATIC CIRRHOSIS TYPE, UNSPECIFIED WHETHER ASCITES PRESENT (HCC): Primary | ICD-10-CM

## 2024-12-04 LAB
ABO + RH BLD: NORMAL
ALBUMIN SERPL-MCNC: 3.7 G/DL (ref 3.4–5)
ALP SERPL-CCNC: 215 U/L (ref 40–129)
ALT SERPL-CCNC: 24 U/L (ref 10–40)
ANION GAP SERPL CALCULATED.3IONS-SCNC: 17 MMOL/L (ref 3–16)
AST SERPL-CCNC: 55 U/L (ref 15–37)
BILIRUB DIRECT SERPL-MCNC: 0.9 MG/DL (ref 0–0.3)
BILIRUB INDIRECT SERPL-MCNC: 0.7 MG/DL (ref 0–1)
BILIRUB SERPL-MCNC: 1.6 MG/DL (ref 0–1)
BLD GP AB SCN SERPL QL: NORMAL
BUN SERPL-MCNC: 5 MG/DL (ref 7–20)
CALCIUM SERPL-MCNC: 9.1 MG/DL (ref 8.3–10.6)
CHLORIDE SERPL-SCNC: 100 MMOL/L (ref 99–110)
CO2 SERPL-SCNC: 24 MMOL/L (ref 21–32)
CREAT SERPL-MCNC: 0.8 MG/DL (ref 0.8–1.3)
DEPRECATED RDW RBC AUTO: 16.2 % (ref 12.4–15.4)
EKG DIAGNOSIS: NORMAL
EKG Q-T INTERVAL: 392 MS
EKG QRS DURATION: 86 MS
EKG QTC CALCULATION (BAZETT): 457 MS
EKG R AXIS: 31 DEGREES
EKG T AXIS: 19 DEGREES
EKG VENTRICULAR RATE: 82 BPM
FOLATE SERPL-MCNC: 14.5 NG/ML (ref 4.78–24.2)
GFR SERPLBLD CREATININE-BSD FMLA CKD-EPI: >90 ML/MIN/{1.73_M2}
GLUCOSE SERPL-MCNC: 118 MG/DL (ref 70–99)
HCT VFR BLD AUTO: 38.4 % (ref 40.5–52.5)
HGB BLD-MCNC: 12 G/DL (ref 13.5–17.5)
INR PPP: 2.05 (ref 0.85–1.15)
MCH RBC QN AUTO: 25.7 PG (ref 26–34)
MCHC RBC AUTO-ENTMCNC: 31.1 G/DL (ref 31–36)
MCV RBC AUTO: 82.8 FL (ref 80–100)
PLATELET # BLD AUTO: 173 K/UL (ref 135–450)
PMV BLD AUTO: 8.6 FL (ref 5–10.5)
POTASSIUM SERPL-SCNC: 3.1 MMOL/L (ref 3.5–5.1)
PROT SERPL-MCNC: 6.4 G/DL (ref 6.4–8.2)
PROTHROMBIN TIME: 23.2 SEC (ref 11.9–14.9)
RBC # BLD AUTO: 4.65 M/UL (ref 4.2–5.9)
SODIUM SERPL-SCNC: 141 MMOL/L (ref 136–145)
VIT B12 SERPL-MCNC: 1478 PG/ML (ref 211–911)
WBC # BLD AUTO: 7.5 K/UL (ref 4–11)

## 2024-12-04 PROCEDURE — 85027 COMPLETE CBC AUTOMATED: CPT

## 2024-12-04 PROCEDURE — 86015 ACTIN ANTIBODY EACH: CPT

## 2024-12-04 PROCEDURE — 84165 PROTEIN E-PHORESIS SERUM: CPT

## 2024-12-04 PROCEDURE — 74178 CT ABD&PLV WO CNTR FLWD CNTR: CPT

## 2024-12-04 PROCEDURE — 82103 ALPHA-1-ANTITRYPSIN TOTAL: CPT

## 2024-12-04 PROCEDURE — 82390 ASSAY OF CERULOPLASMIN: CPT

## 2024-12-04 PROCEDURE — 82607 VITAMIN B-12: CPT

## 2024-12-04 PROCEDURE — 6360000004 HC RX CONTRAST MEDICATION: Performed by: INTERNAL MEDICINE

## 2024-12-04 PROCEDURE — 83516 IMMUNOASSAY NONANTIBODY: CPT

## 2024-12-04 PROCEDURE — 2709999900 HC NON-CHARGEABLE SUPPLY: Performed by: INTERNAL MEDICINE

## 2024-12-04 PROCEDURE — 2580000003 HC RX 258

## 2024-12-04 PROCEDURE — 84155 ASSAY OF PROTEIN SERUM: CPT

## 2024-12-04 PROCEDURE — 93005 ELECTROCARDIOGRAM TRACING: CPT | Performed by: INTERNAL MEDICINE

## 2024-12-04 PROCEDURE — 86900 BLOOD TYPING SEROLOGIC ABO: CPT

## 2024-12-04 PROCEDURE — 82746 ASSAY OF FOLIC ACID SERUM: CPT

## 2024-12-04 PROCEDURE — 93010 ELECTROCARDIOGRAM REPORT: CPT | Performed by: INTERNAL MEDICINE

## 2024-12-04 PROCEDURE — 80048 BASIC METABOLIC PNL TOTAL CA: CPT

## 2024-12-04 PROCEDURE — 80076 HEPATIC FUNCTION PANEL: CPT

## 2024-12-04 PROCEDURE — 86850 RBC ANTIBODY SCREEN: CPT

## 2024-12-04 PROCEDURE — 6370000000 HC RX 637 (ALT 250 FOR IP)

## 2024-12-04 PROCEDURE — 85610 PROTHROMBIN TIME: CPT

## 2024-12-04 PROCEDURE — 82105 ALPHA-FETOPROTEIN SERUM: CPT

## 2024-12-04 PROCEDURE — 86038 ANTINUCLEAR ANTIBODIES: CPT

## 2024-12-04 PROCEDURE — 36415 COLL VENOUS BLD VENIPUNCTURE: CPT

## 2024-12-04 PROCEDURE — G0378 HOSPITAL OBSERVATION PER HR: HCPCS

## 2024-12-04 PROCEDURE — 84425 ASSAY OF VITAMIN B-1: CPT

## 2024-12-04 PROCEDURE — 86901 BLOOD TYPING SEROLOGIC RH(D): CPT

## 2024-12-04 PROCEDURE — 80074 ACUTE HEPATITIS PANEL: CPT

## 2024-12-04 RX ORDER — SODIUM CHLORIDE, SODIUM LACTATE, POTASSIUM CHLORIDE, CALCIUM CHLORIDE 600; 310; 30; 20 MG/100ML; MG/100ML; MG/100ML; MG/100ML
INJECTION, SOLUTION INTRAVENOUS CONTINUOUS
Status: DISCONTINUED | OUTPATIENT
Start: 2024-12-04 | End: 2024-12-04 | Stop reason: HOSPADM

## 2024-12-04 RX ORDER — SODIUM CHLORIDE 0.9 % (FLUSH) 0.9 %
5-40 SYRINGE (ML) INJECTION EVERY 12 HOURS SCHEDULED
Status: DISCONTINUED | OUTPATIENT
Start: 2024-12-04 | End: 2024-12-05 | Stop reason: HOSPADM

## 2024-12-04 RX ORDER — ONDANSETRON 2 MG/ML
4 INJECTION INTRAMUSCULAR; INTRAVENOUS EVERY 6 HOURS PRN
Status: DISCONTINUED | OUTPATIENT
Start: 2024-12-04 | End: 2024-12-05 | Stop reason: HOSPADM

## 2024-12-04 RX ORDER — SODIUM CHLORIDE 0.9 % (FLUSH) 0.9 %
5-40 SYRINGE (ML) INJECTION EVERY 12 HOURS SCHEDULED
Status: DISCONTINUED | OUTPATIENT
Start: 2024-12-04 | End: 2024-12-04 | Stop reason: HOSPADM

## 2024-12-04 RX ORDER — LORAZEPAM 0.5 MG/1
1 TABLET ORAL
Status: DISCONTINUED | OUTPATIENT
Start: 2024-12-04 | End: 2024-12-05 | Stop reason: HOSPADM

## 2024-12-04 RX ORDER — LORAZEPAM 1 MG/1
4 TABLET ORAL
Status: DISCONTINUED | OUTPATIENT
Start: 2024-12-04 | End: 2024-12-05 | Stop reason: HOSPADM

## 2024-12-04 RX ORDER — SODIUM CHLORIDE 0.9 % (FLUSH) 0.9 %
5-40 SYRINGE (ML) INJECTION PRN
Status: DISCONTINUED | OUTPATIENT
Start: 2024-12-04 | End: 2024-12-04 | Stop reason: HOSPADM

## 2024-12-04 RX ORDER — MIDAZOLAM HYDROCHLORIDE 1 MG/ML
2 INJECTION, SOLUTION INTRAMUSCULAR; INTRAVENOUS
Status: CANCELLED | OUTPATIENT
Start: 2024-12-04 | End: 2024-12-05

## 2024-12-04 RX ORDER — ONDANSETRON 2 MG/ML
4 INJECTION INTRAMUSCULAR; INTRAVENOUS ONCE
Status: CANCELLED | OUTPATIENT
Start: 2024-12-04 | End: 2024-12-04

## 2024-12-04 RX ORDER — MEPERIDINE HYDROCHLORIDE 50 MG/ML
12.5 INJECTION INTRAMUSCULAR; INTRAVENOUS; SUBCUTANEOUS EVERY 5 MIN PRN
Status: CANCELLED | OUTPATIENT
Start: 2024-12-04

## 2024-12-04 RX ORDER — LORAZEPAM 2 MG/ML
3 INJECTION INTRAMUSCULAR
Status: DISCONTINUED | OUTPATIENT
Start: 2024-12-04 | End: 2024-12-05 | Stop reason: HOSPADM

## 2024-12-04 RX ORDER — ATORVASTATIN CALCIUM 40 MG/1
40 TABLET, FILM COATED ORAL NIGHTLY
Status: DISCONTINUED | OUTPATIENT
Start: 2024-12-04 | End: 2024-12-05 | Stop reason: HOSPADM

## 2024-12-04 RX ORDER — LORAZEPAM 1 MG/1
3 TABLET ORAL
Status: DISCONTINUED | OUTPATIENT
Start: 2024-12-04 | End: 2024-12-05 | Stop reason: HOSPADM

## 2024-12-04 RX ORDER — LANOLIN ALCOHOL/MO/W.PET/CERES
100 CREAM (GRAM) TOPICAL DAILY
Status: DISCONTINUED | OUTPATIENT
Start: 2024-12-04 | End: 2024-12-05 | Stop reason: HOSPADM

## 2024-12-04 RX ORDER — PANTOPRAZOLE SODIUM 40 MG/1
40 TABLET, DELAYED RELEASE ORAL 2 TIMES DAILY
Status: DISCONTINUED | OUTPATIENT
Start: 2024-12-04 | End: 2024-12-05 | Stop reason: HOSPADM

## 2024-12-04 RX ORDER — IOPAMIDOL 755 MG/ML
75 INJECTION, SOLUTION INTRAVASCULAR
Status: COMPLETED | OUTPATIENT
Start: 2024-12-04 | End: 2024-12-04

## 2024-12-04 RX ORDER — SODIUM CHLORIDE 0.9 % (FLUSH) 0.9 %
5-40 SYRINGE (ML) INJECTION EVERY 12 HOURS SCHEDULED
Status: CANCELLED | OUTPATIENT
Start: 2024-12-04

## 2024-12-04 RX ORDER — LORAZEPAM 2 MG/ML
2 INJECTION INTRAMUSCULAR
Status: DISCONTINUED | OUTPATIENT
Start: 2024-12-04 | End: 2024-12-05 | Stop reason: HOSPADM

## 2024-12-04 RX ORDER — SODIUM CHLORIDE 0.9 % (FLUSH) 0.9 %
5-40 SYRINGE (ML) INJECTION PRN
Status: DISCONTINUED | OUTPATIENT
Start: 2024-12-04 | End: 2024-12-05 | Stop reason: HOSPADM

## 2024-12-04 RX ORDER — SODIUM CHLORIDE 9 MG/ML
INJECTION, SOLUTION INTRAVENOUS PRN
Status: DISCONTINUED | OUTPATIENT
Start: 2024-12-04 | End: 2024-12-04 | Stop reason: HOSPADM

## 2024-12-04 RX ORDER — OXYCODONE HYDROCHLORIDE 5 MG/1
5 TABLET ORAL PRN
Status: CANCELLED | OUTPATIENT
Start: 2024-12-04

## 2024-12-04 RX ORDER — SODIUM CHLORIDE 9 MG/ML
INJECTION, SOLUTION INTRAVENOUS PRN
Status: DISCONTINUED | OUTPATIENT
Start: 2024-12-04 | End: 2024-12-05 | Stop reason: HOSPADM

## 2024-12-04 RX ORDER — POLYETHYLENE GLYCOL 3350 17 G/17G
17 POWDER, FOR SOLUTION ORAL DAILY PRN
Status: DISCONTINUED | OUTPATIENT
Start: 2024-12-04 | End: 2024-12-05 | Stop reason: HOSPADM

## 2024-12-04 RX ORDER — SODIUM CHLORIDE 9 MG/ML
INJECTION, SOLUTION INTRAVENOUS PRN
Status: CANCELLED | OUTPATIENT
Start: 2024-12-04

## 2024-12-04 RX ORDER — ONDANSETRON 2 MG/ML
4 INJECTION INTRAMUSCULAR; INTRAVENOUS EVERY 6 HOURS PRN
Status: DISCONTINUED | OUTPATIENT
Start: 2024-12-04 | End: 2024-12-04 | Stop reason: HOSPADM

## 2024-12-04 RX ORDER — DIPHENHYDRAMINE HYDROCHLORIDE 50 MG/ML
6.25 INJECTION INTRAMUSCULAR; INTRAVENOUS
Status: CANCELLED | OUTPATIENT
Start: 2024-12-04 | End: 2024-12-05

## 2024-12-04 RX ORDER — LORAZEPAM 0.5 MG/1
2 TABLET ORAL
Status: DISCONTINUED | OUTPATIENT
Start: 2024-12-04 | End: 2024-12-05 | Stop reason: HOSPADM

## 2024-12-04 RX ORDER — OXYCODONE HYDROCHLORIDE 5 MG/1
10 TABLET ORAL PRN
Status: CANCELLED | OUTPATIENT
Start: 2024-12-04

## 2024-12-04 RX ORDER — LORAZEPAM 0.5 MG/1
0.5 TABLET ORAL
Status: DISCONTINUED | OUTPATIENT
Start: 2024-12-04 | End: 2024-12-04 | Stop reason: HOSPADM

## 2024-12-04 RX ORDER — DILTIAZEM HYDROCHLORIDE 240 MG/1
240 CAPSULE, COATED, EXTENDED RELEASE ORAL DAILY
Status: DISCONTINUED | OUTPATIENT
Start: 2024-12-05 | End: 2024-12-05 | Stop reason: HOSPADM

## 2024-12-04 RX ORDER — ONDANSETRON 4 MG/1
4 TABLET, ORALLY DISINTEGRATING ORAL EVERY 8 HOURS PRN
Status: DISCONTINUED | OUTPATIENT
Start: 2024-12-04 | End: 2024-12-05 | Stop reason: HOSPADM

## 2024-12-04 RX ORDER — MIDAZOLAM HYDROCHLORIDE 1 MG/ML
2 INJECTION, SOLUTION INTRAMUSCULAR; INTRAVENOUS
Status: DISCONTINUED | OUTPATIENT
Start: 2024-12-04 | End: 2024-12-04 | Stop reason: HOSPADM

## 2024-12-04 RX ORDER — NALOXONE HYDROCHLORIDE 0.4 MG/ML
INJECTION, SOLUTION INTRAMUSCULAR; INTRAVENOUS; SUBCUTANEOUS PRN
Status: CANCELLED | OUTPATIENT
Start: 2024-12-04

## 2024-12-04 RX ORDER — LORAZEPAM 2 MG/ML
4 INJECTION INTRAMUSCULAR
Status: DISCONTINUED | OUTPATIENT
Start: 2024-12-04 | End: 2024-12-05 | Stop reason: HOSPADM

## 2024-12-04 RX ORDER — SODIUM CHLORIDE 0.9 % (FLUSH) 0.9 %
5-40 SYRINGE (ML) INJECTION PRN
Status: CANCELLED | OUTPATIENT
Start: 2024-12-04

## 2024-12-04 RX ORDER — LORAZEPAM 2 MG/ML
1 INJECTION INTRAMUSCULAR
Status: DISCONTINUED | OUTPATIENT
Start: 2024-12-04 | End: 2024-12-05 | Stop reason: HOSPADM

## 2024-12-04 RX ADMIN — ATORVASTATIN CALCIUM 40 MG: 40 TABLET, FILM COATED ORAL at 20:56

## 2024-12-04 RX ADMIN — PANTOPRAZOLE SODIUM 40 MG: 40 TABLET, DELAYED RELEASE ORAL at 20:56

## 2024-12-04 RX ADMIN — PANTOPRAZOLE SODIUM 40 MG: 40 TABLET, DELAYED RELEASE ORAL at 15:39

## 2024-12-04 RX ADMIN — IOPAMIDOL 75 ML: 755 INJECTION, SOLUTION INTRAVENOUS at 08:18

## 2024-12-04 RX ADMIN — Medication 10 ML: at 20:57

## 2024-12-04 RX ADMIN — APIXABAN 5 MG: 5 TABLET, FILM COATED ORAL at 20:57

## 2024-12-04 RX ADMIN — Medication 100 MG: at 15:40

## 2024-12-04 RX ADMIN — APIXABAN 5 MG: 5 TABLET, FILM COATED ORAL at 15:40

## 2024-12-04 NOTE — PROGRESS NOTES
4 Eyes Skin Assessment and Patient belongings     The patient is being assess for  Admission    I agree that 2 Nurses have performed a thorough Head to Toe Skin Assessment on the patient. ALL assessment sites listed below have been assessed.       Areas assessed by both nurses:   [x]   Head, Face, and Ears   [x]   Shoulders, Back, and Chest  [x]   Arms, Elbows, and Hands   [x]   Coccyx, Sacrum, and IschIum  [x]   Legs, Feet, and Heels        Does the Patient have Skin Breakdown?  No        Reddy Prevention initiated:  No   Wound Care Orders initiated:  No      [facility] nurse consulted for Pressure Injury (Stage 3,4, Unstageable, DTI, NWPT, and Complex wounds), New and Established Ostomies:  No      I agree that 2 Nurses have reviewed patient belongings with the patient/family and documented in the flowsheet upon admission or transfer to the unit.     Belongings  Dental Appliances: None  Vision - Corrective Lenses: Eyeglasses, At bedside  Hearing Aid: None  Clothing: Undergarments, Pants, Shirt, At bedside  Jewelry: None  Electronic Devices: Cell Phone, At bedside  Weapons (Notify Protective Services/Security): None  Home Medications: None  Valuables Given To: Patient  Provide Name(s) of Who Valuable(s) Were Given To: n/a  Responsible person(s) in the waiting room: kenny  Patient approves for provider to speak to responsible person post operatively: Yes       Nurse 1 eSignature: Electronically signed by Dolores Gutierrez RN on 12/4/24 at 4:25 PM EST    **SHARE this note so that the co-signing nurse is able to place an eSignature**    Nurse 2 eSignature: {Esignature:[id number]}

## 2024-12-04 NOTE — ANESTHESIA PRE PROCEDURE
Department of Anesthesiology  Preprocedure Note       Name:  Jamal Hutchison   Age:  74 y.o.  :  1950                                          MRN:  [id number]         Date:  2024      Surgeon: Surgeon(s):  HERMINIA Murry Jr., MD    Procedure: Procedure(s):  Ablation A-fib w complete ep study    Medications prior to admission:   Prior to Admission medications    Medication Sig Start Date End Date Taking? Authorizing Provider   apixaban (ELIQUIS) 5 MG TABS tablet Take 1 tablet by mouth 2 times daily 24  Yes HERMINIA Murry Jr., MD   dilTIAZem (CARDIZEM CD) 240 MG extended release capsule TAKE 1 CAPSULE BY MOUTH EVERY DAY IN THE MORNING AND AT BEDTIME 24  Yes HERMINIA Murry Jr., MD   pantoprazole (PROTONIX) 40 MG tablet Take 1 tablet by mouth 2 times daily 24  Yes Tiffanie Contreras APRN - CNP   clopidogrel (PLAVIX) 75 MG tablet TAKE 1 TABLET BY MOUTH EVERY DAY 24  Yes Nathan Castaneda MD   magnesium oxide (MAG-OX) 400 MG tablet Take 1 tablet by mouth daily 23  Yes Linda Michele APRN - CNP   atorvastatin (LIPITOR) 80 MG tablet Take 1 tablet by mouth nightly  Patient taking differently: Take 1 tablet by mouth nightly Pt taking 1/2 tab 5/15/23  Yes Alyssa Hurtado APRN - CNP   Multiple Vitamins-Minerals (THERAPEUTIC MULTIVITAMIN-MINERALS) tablet Take 1 tablet by mouth daily   Yes Provider, MD Yoko       Current medications:    Current Facility-Administered Medications   Medication Dose Route Frequency Provider Last Rate Last Admin   • sodium chloride flush 0.9 % injection 5-40 mL  5-40 mL IntraVENous 2 times per day HERMINIA Murry Jr., MD       • sodium chloride flush 0.9 % injection 5-40 mL  5-40 mL IntraVENous PRN HERMINIA Murry Jr., MD       • 0.9 % sodium chloride infusion   IntraVENous PRN HERMINIA Murry Jr., MD       • LORazepam (ATIVAN) tablet 0.5 mg  0.5 mg Oral Once PRN HERMINIA Murry Jr., MD       • ondansetron (ZOFRAN) injection 4 mg  4 mg IntraVENous Q6H  PRN HERMINIA Murry Jr., MD       • lactated ringers infusion   IntraVENous Continuous Shant Pérez MD       • sodium chloride flush 0.9 % injection 5-40 mL  5-40 mL IntraVENous 2 times per day Shant Pérez MD       • sodium chloride flush 0.9 % injection 5-40 mL  5-40 mL IntraVENous PRN Shant Pérez MD       • 0.9 % sodium chloride infusion   IntraVENous PRN Shant Pérez MD       • midazolam (VERSED) injection 2 mg  2 mg IntraVENous Once PRN Shant Pérez MD           Allergies:  No Known Allergies    Problem List:    Patient Active Problem List   Diagnosis Code   • Atrial fibrillation with rapid ventricular response (HCC) I48.91   • Stroke-like symptoms R29.90   • Left arm numbness R20.0   • Facial droop (left) R29.810   • Expressive aphasia R47.01   • Elevated blood pressure reading R03.0   • Persistent atrial fibrillation (HCC) I48.19   • Cerebrovascular accident (CVA) ([facility]) I63.9   • Primary hypertension I10   • Stenosis of left carotid artery I65.22   • Carotid stenosis, left I65.22   • Typical atrial flutter ([facility]) I48.3   • PVD (peripheral vascular disease) ([facility]) I73.9   • A-fib (HCC) I48.91       Past Medical History:        Diagnosis Date   • Atrial fibrillation with rapid ventricular response (HCC)    • CAD (coronary artery disease)    • CVA (cerebral vascular accident) ([facility])    • Hearing loss     right   • Hyperlipidemia    • Hypertension    • TIA (transient ischemic attack)        Past Surgical History:        Procedure Laterality Date   • CAROTID ENDARTERECTOMY Left 2023    LEFT CAROTID ENDARTERECTOMY performed by Nathan Castaneda MD at [facility] OR   • COLONOSCOPY     • HERNIA REPAIR     • TIBIA FRACTURE SURGERY Left        Social History:    Social History     Tobacco Use   • Smoking status: Former     Current packs/day: 0.00     Types: Cigarettes     Quit date:      Years since quittin.9   • Smokeless tobacco: Never   Substance Use Topics   • Alcohol use: Yes     Alcohol/week: 6.0

## 2024-12-04 NOTE — CONSULTS
Consult Placed Perfect Serve    Who: Jami Pope  Date: 12/4/2024  Time: 1625     Electronically signed by Susanne Jenkins RN on 12/4/2024 at 4:24 PM

## 2024-12-04 NOTE — H&P
chloride flush  5-40 mL IntraVENous 2 times per day      Infusions:    sodium chloride      lactated ringers      sodium chloride      sodium chloride       PRN Meds: sodium chloride flush, 5-40 mL, PRN  sodium chloride, , PRN  LORazepam, 0.5 mg, Once PRN  ondansetron, 4 mg, Q6H PRN  sodium chloride flush, 5-40 mL, PRN  sodium chloride, , PRN  midazolam, 2 mg, Once PRN  sodium chloride flush, 5-40 mL, PRN  sodium chloride, , PRN  ondansetron, 4 mg, Q8H PRN   Or  ondansetron, 4 mg, Q6H PRN  polyethylene glycol, 17 g, Daily PRN        Labs      CBC:   Recent Labs     12/04/24  0720   WBC 7.5   HGB 12.0*        BMP:    Recent Labs     12/04/24  0720      K 3.1*      CO2 24   BUN 5*   CREATININE 0.8   GLUCOSE 118*     Hepatic: No results for input(s): \"AST\", \"ALT\", \"BILITOT\", \"ALKPHOS\" in the last 72 hours.    Invalid input(s): \"ALB\"  Lipids:   Lab Results   Component Value Date/Time    CHOL 176 05/15/2023 06:22 AM    HDL 44 05/15/2023 06:22 AM    TRIG 211 05/15/2023 06:22 AM     Hemoglobin A1C:   Lab Results   Component Value Date/Time    LABA1C 4.8 05/15/2023 06:22 AM     TSH:   Lab Results   Component Value Date/Time    TSH 3.82 05/15/2023 06:22 AM     Troponin: No results found for: \"TROPONINT\"  Lactic Acid: No results for input(s): \"LACTA\" in the last 72 hours.  BNP: No results for input(s): \"PROBNP\" in the last 72 hours.  UA:No results found for: \"NITRU\", \"COLORU\", \"PHUR\", \"LABCAST\", \"WBCUA\", \"RBCUA\", \"MUCUS\", \"TRICHOMONAS\", \"YEAST\", \"BACTERIA\", \"CLARITYU\", \"SPECGRAV\", \"LEUKOCYTESUR\", \"UROBILINOGEN\", \"BILIRUBINUR\", \"BLOODU\", \"GLUCOSEU\", \"KETUA\", \"AMORPHOUS\"  Urine Cultures: No results found for: \"LABURIN\"  Blood Cultures: No results found for: \"BC\"  No results found for: \"BLOODCULT2\"  Organism: No results found for: \"ORG\"    Imaging/Diagnostics Last 24 Hours   CT ABDOMEN PELVIS W WO CONTRAST Additional Contrast? Radiologist Recommendation    Result Date: 12/4/2024  EXAMINATION: CT OF THE ABDOMEN  is identified and normal in caliber Aorta and retroperitoneum: Atherosclerotic change seen in abdominal aorta. Abdominal aorta measures 3.0 cm.  Atherosclerotic change is seen at the origin of the celiac, superior mesenteric artery, and renal arteries..  There is likely severe narrowing of the SMA, distal to its origin. Pelvis: Small amount of pelvic ascites is seen Bladder is contracted, accentuating its wall thickness.  Prostate measures 5.5 cm x 4.6 cm.  Atherosclerotic change seen in the iliacs.  No pelvic adenopathy. Bones and soft tissues:  Spurring is seen in the spine.     Cirrhosis, and mild abdominal and pelvic ascites..  Spleno renal shunt is seen Hazy ground-glass opacity in the right middle lobe either atelectasis or postinflammatory-infectious change. Dilated abdominal aorta measuring up to 3 cm with scattered areas of atherosclerosis. There is likely severe narrowing of the SMA, distal to its origin. RECOMMENDATIONS: Abdominal aortic aneurysm measuring 3 cm. Recommend surveillance ultrasound in 3 years. Reference: Journal of Vascular Surgery 67.1 (2018): 2-77. J Am Karen Radiol 2013;10:789-794.     Echo (TTE) limited (PRN contrast/bubble/strain/3D)    Result Date: 12/3/2024    Limited echo for LA size and LV function   Image quality is adequate.   Left Ventricle: Normal left ventricular systolic function with a visually estimated EF of 55 - 60%. Left ventricle size is normal. Mildly increased wall thickness. Normal wall motion.   Left Atrium: Left atrium is moderate to severely dilated.   Aortic Valve: Moderate sclerosis of the aortic valve cusps particularly of NCC.   Mitral Valve: Mild annular calcification at the posterior leaflet. Moderately thickened leaflets.   Last echo from 5/15/2023 showed a EF of 55-60%.         Electronically signed by Lion Tinoco DO on 12/4/2024 at 12:10 PM

## 2024-12-05 ENCOUNTER — APPOINTMENT (OUTPATIENT)
Dept: ULTRASOUND IMAGING | Age: 74
End: 2024-12-05
Attending: INTERNAL MEDICINE
Payer: MEDICARE

## 2024-12-05 VITALS
SYSTOLIC BLOOD PRESSURE: 153 MMHG | HEIGHT: 72 IN | OXYGEN SATURATION: 97 % | DIASTOLIC BLOOD PRESSURE: 89 MMHG | RESPIRATION RATE: 18 BRPM | WEIGHT: 198.2 LBS | HEART RATE: 96 BPM | BODY MASS INDEX: 26.84 KG/M2 | TEMPERATURE: 98.2 F

## 2024-12-05 PROBLEM — E44.0 MODERATE PROTEIN-CALORIE MALNUTRITION (HCC): Status: ACTIVE | Noted: 2024-12-05

## 2024-12-05 LAB
ALBUMIN SERPL-MCNC: 3.1 G/DL (ref 3.4–5)
ALBUMIN/GLOB SERPL: 1.6 {RATIO} (ref 1.1–2.2)
ALP SERPL-CCNC: 167 U/L (ref 40–129)
ALT SERPL-CCNC: 19 U/L (ref 10–40)
ANA SER QL IA: NEGATIVE
ANION GAP SERPL CALCULATED.3IONS-SCNC: 9 MMOL/L (ref 3–16)
AST SERPL-CCNC: 43 U/L (ref 15–37)
BASOPHILS # BLD: 0 K/UL (ref 0–0.2)
BASOPHILS NFR BLD: 0.7 %
BILIRUB SERPL-MCNC: 1.3 MG/DL (ref 0–1)
BUN SERPL-MCNC: 6 MG/DL (ref 7–20)
CALCIUM SERPL-MCNC: 8 MG/DL (ref 8.3–10.6)
CHLORIDE SERPL-SCNC: 104 MMOL/L (ref 99–110)
CO2 SERPL-SCNC: 27 MMOL/L (ref 21–32)
CREAT SERPL-MCNC: 0.7 MG/DL (ref 0.8–1.3)
DEPRECATED RDW RBC AUTO: 16.3 % (ref 12.4–15.4)
EOSINOPHIL # BLD: 0.1 K/UL (ref 0–0.6)
EOSINOPHIL NFR BLD: 1.7 %
FERRITIN SERPL IA-MCNC: 23.3 NG/ML (ref 30–400)
GFR SERPLBLD CREATININE-BSD FMLA CKD-EPI: >90 ML/MIN/{1.73_M2}
GLUCOSE SERPL-MCNC: 94 MG/DL (ref 70–99)
HAV IGM SERPL QL IA: NORMAL
HBV CORE IGM SERPL QL IA: NORMAL
HBV SURFACE AG SERPL QL IA: NORMAL
HCT VFR BLD AUTO: 33.2 % (ref 40.5–52.5)
HCV AB SERPL QL IA: NORMAL
HGB BLD-MCNC: 10.4 G/DL (ref 13.5–17.5)
LYMPHOCYTES # BLD: 0.6 K/UL (ref 1–5.1)
LYMPHOCYTES NFR BLD: 11.2 %
MAGNESIUM SERPL-MCNC: 1.44 MG/DL (ref 1.8–2.4)
MCH RBC QN AUTO: 25.8 PG (ref 26–34)
MCHC RBC AUTO-ENTMCNC: 31.4 G/DL (ref 31–36)
MCV RBC AUTO: 82.2 FL (ref 80–100)
MONOCYTES # BLD: 0.6 K/UL (ref 0–1.3)
MONOCYTES NFR BLD: 11.2 %
NEUTROPHILS # BLD: 3.8 K/UL (ref 1.7–7.7)
NEUTROPHILS NFR BLD: 75.2 %
PLATELET # BLD AUTO: 101 K/UL (ref 135–450)
PMV BLD AUTO: 8.6 FL (ref 5–10.5)
POTASSIUM SERPL-SCNC: 3 MMOL/L (ref 3.5–5.1)
PROT SERPL-MCNC: 5.1 G/DL (ref 6.4–8.2)
RBC # BLD AUTO: 4.04 M/UL (ref 4.2–5.9)
SODIUM SERPL-SCNC: 140 MMOL/L (ref 136–145)
WBC # BLD AUTO: 5 K/UL (ref 4–11)

## 2024-12-05 PROCEDURE — G0378 HOSPITAL OBSERVATION PER HR: HCPCS

## 2024-12-05 PROCEDURE — 6370000000 HC RX 637 (ALT 250 FOR IP)

## 2024-12-05 PROCEDURE — 36415 COLL VENOUS BLD VENIPUNCTURE: CPT

## 2024-12-05 PROCEDURE — 96366 THER/PROPH/DIAG IV INF ADDON: CPT

## 2024-12-05 PROCEDURE — 6360000002 HC RX W HCPCS

## 2024-12-05 PROCEDURE — 83735 ASSAY OF MAGNESIUM: CPT

## 2024-12-05 PROCEDURE — 80053 COMPREHEN METABOLIC PANEL: CPT

## 2024-12-05 PROCEDURE — 76705 ECHO EXAM OF ABDOMEN: CPT

## 2024-12-05 PROCEDURE — 2580000003 HC RX 258

## 2024-12-05 PROCEDURE — 96365 THER/PROPH/DIAG IV INF INIT: CPT

## 2024-12-05 PROCEDURE — 85025 COMPLETE CBC W/AUTO DIFF WBC: CPT

## 2024-12-05 PROCEDURE — 82728 ASSAY OF FERRITIN: CPT

## 2024-12-05 PROCEDURE — 6370000000 HC RX 637 (ALT 250 FOR IP): Performed by: INTERNAL MEDICINE

## 2024-12-05 RX ORDER — LANOLIN ALCOHOL/MO/W.PET/CERES
100 CREAM (GRAM) TOPICAL DAILY
Qty: 30 TABLET | Refills: 0 | Status: SHIPPED | OUTPATIENT
Start: 2024-12-05

## 2024-12-05 RX ORDER — LANOLIN ALCOHOL/MO/W.PET/CERES
400 CREAM (GRAM) TOPICAL ONCE
Status: COMPLETED | OUTPATIENT
Start: 2024-12-05 | End: 2024-12-05

## 2024-12-05 RX ORDER — POTASSIUM CHLORIDE 7.45 MG/ML
10 INJECTION INTRAVENOUS
Status: DISCONTINUED | OUTPATIENT
Start: 2024-12-05 | End: 2024-12-05

## 2024-12-05 RX ORDER — POTASSIUM CHLORIDE 1500 MG/1
40 TABLET, EXTENDED RELEASE ORAL ONCE
Status: COMPLETED | OUTPATIENT
Start: 2024-12-05 | End: 2024-12-05

## 2024-12-05 RX ORDER — ATORVASTATIN CALCIUM 40 MG/1
40 TABLET, FILM COATED ORAL NIGHTLY
Qty: 30 TABLET | Refills: 0 | Status: SHIPPED | OUTPATIENT
Start: 2024-12-05

## 2024-12-05 RX ORDER — MECOBALAMIN 5000 MCG
10 TABLET,DISINTEGRATING ORAL NIGHTLY
Status: DISCONTINUED | OUTPATIENT
Start: 2024-12-05 | End: 2024-12-05 | Stop reason: HOSPADM

## 2024-12-05 RX ORDER — MAGNESIUM SULFATE IN WATER 40 MG/ML
4000 INJECTION, SOLUTION INTRAVENOUS ONCE
Status: DISCONTINUED | OUTPATIENT
Start: 2024-12-05 | End: 2024-12-05

## 2024-12-05 RX ADMIN — SODIUM CHLORIDE: 9 INJECTION, SOLUTION INTRAVENOUS at 08:28

## 2024-12-05 RX ADMIN — DILTIAZEM HYDROCHLORIDE 240 MG: 240 CAPSULE, COATED, EXTENDED RELEASE ORAL at 10:06

## 2024-12-05 RX ADMIN — PANTOPRAZOLE SODIUM 40 MG: 40 TABLET, DELAYED RELEASE ORAL at 10:06

## 2024-12-05 RX ADMIN — Medication 100 MG: at 10:06

## 2024-12-05 RX ADMIN — POTASSIUM CHLORIDE 10 MEQ: 7.46 INJECTION, SOLUTION INTRAVENOUS at 08:30

## 2024-12-05 RX ADMIN — POTASSIUM CHLORIDE 40 MEQ: 1500 TABLET, EXTENDED RELEASE ORAL at 10:10

## 2024-12-05 RX ADMIN — Medication 400 MG: at 10:10

## 2024-12-05 RX ADMIN — APIXABAN 5 MG: 5 TABLET, FILM COATED ORAL at 10:06

## 2024-12-05 NOTE — PLAN OF CARE
Problem: Chronic Conditions and Co-morbidities  Goal: Patient's chronic conditions and co-morbidity symptoms are monitored and maintained or improved  Outcome: Progressing  Flowsheets (Taken 12/4/2024 1617)  Care Plan - Patient's Chronic Conditions and Co-Morbidity Symptoms are Monitored and Maintained or Improved: Monitor and assess patient's chronic conditions and comorbid symptoms for stability, deterioration, or improvement     Problem: Discharge Planning  Goal: Discharge to home or other facility with appropriate resources  Outcome: Progressing  Flowsheets  Taken 12/4/2024 1617  Discharge to home or other facility with appropriate resources: Identify barriers to discharge with patient and caregiver  Taken 12/4/2024 1615  Discharge to home or other facility with appropriate resources: Identify barriers to discharge with patient and caregiver     Problem: ABCDS Injury Assessment  Goal: Absence of physical injury  Outcome: Progressing     Problem: Cardiovascular - Adult  Goal: Maintains optimal cardiac output and hemodynamic stability  Outcome: Progressing  Goal: Absence of cardiac dysrhythmias or at baseline  Outcome: Progressing     Problem: Gastrointestinal - Adult  Goal: Minimal or absence of nausea and vomiting  Outcome: Progressing  Goal: Maintains or returns to baseline bowel function  Outcome: Progressing  Goal: Maintains adequate nutritional intake  Outcome: Progressing     
  Problem: Discharge Planning  Goal: Discharge to home or other facility with appropriate resources  12/5/2024 0029 by Haile Yu RN  Outcome: Progressing     Problem: ABCDS Injury Assessment  Goal: Absence of physical injury  12/5/2024 0029 by Haile Yu RN  Outcome: Progressing     Problem: Cardiovascular - Adult  Goal: Absence of cardiac dysrhythmias or at baseline  12/5/2024 0029 by Haile Yu RN  Outcome: Progressing     
Received Cath Lab page for admission.  Sent to Dr. Echevarria admitting hospitalist.     
Reviewed plan of care including dc barriers  
No

## 2024-12-05 NOTE — PROGRESS NOTES
PROGRESS NOTE    HPI: Jamal Hutchison is a(n)74 y.o. male admitted for work-up and treatment for A-fib (HCC) [I48.91]  Cirrhosis (HCC) [K74.60].     We are following for cirrhosis.    Subjective:     He has no new complaints today. He is anxious to go home.      Objective:     I/O last 3 completed shifts:  In: 240 [P.O.:240]  Out: -       BP (!) 153/89   Pulse 96   Temp 98.2 °F (36.8 °C) (Oral)   Resp 18   Ht 1.829 m (6')   Wt 89.9 kg (198 lb 3.2 oz)   SpO2 97%   BMI 26.88 kg/m²     Physical Exam:  HEENT: anicteric sclera, oropharyngeal membranes pink and moist.  Cor: RRR  Lungs: non-labored, no respiratory distress  Abdomen: soft, NT. No ascites.  No hepatomegaly or splenomegaly  Extremities: no edema  Neuro: alert and oriented x 3      Results:   Lab Results   Component Value Date    ALT 19 12/05/2024    AST 43 (H) 12/05/2024    ALKPHOS 167 (H) 12/05/2024    BILIDIR 0.9 (H) 12/04/2024    INR 2.05 (H) 12/04/2024     Lab Results   Component Value Date    WBC 5.0 12/05/2024    HGB 10.4 (L) 12/05/2024    HCT 33.2 (L) 12/05/2024    MCV 82.2 12/05/2024     (L) 12/05/2024     BUN/Cr/glu/ALT/AST/amyl/lip:  6/0.7/--/19/43/--/-- (12/05 0555)  US GALLBLADDER RUQ    Result Date: 12/5/2024  Cirrhosis without focal lesion.  Small to moderate volume ascites. Mild nonspecific gallbladder wall thickening with cholelithiasis. Incidentally noted is lack of color Doppler flow within the main portal vein however this appears patent on recent CT from 12/04/2024.  Findings may be artifactual or technically related.  If there is concern for new thrombosis recommend dedicated liver Doppler ultrasound.     CT ABDOMEN PELVIS W WO CONTRAST Additional Contrast? Radiologist Recommendation    Result Date: 12/4/2024  Cirrhosis, and mild abdominal and pelvic ascites..  Spleno renal shunt is seen Hazy ground-glass opacity in the right middle lobe either atelectasis or

## 2024-12-05 NOTE — PROGRESS NOTES
Pt ok for discharge per MD. Discharge instructions and script given. IVs removed without complications. Tele box 90 removed and returned.  No questions or concerns at this time. Transported by wheelchair to personal car.

## 2024-12-05 NOTE — CARE COORDINATION
Case Management Assessment  Initial Evaluation    Date/Time of Evaluation: 12/5/2024 10:23 AM  Assessment Completed by: Dinah Acevedo RN    If patient is discharged prior to next notation, then this note serves as note for discharge by case management.    Patient Name: Jamal Monson                   YOB: 1950  Diagnosis: A-fib (HCC) [I48.91]  Cirrhosis (HCC) [K74.60]                   Date / Time: 12/4/2024  6:51 AM    Patient Admission Status: Observation   Readmission Risk (Low < 19, Mod (19-27), High > 27): No data recorded  Current PCP: Main Velazco III, MD  PCP verified by CM? Yes    Chart Reviewed: Yes      History Provided by: Patient  Patient Orientation: Alert and Oriented    Patient Cognition: Alert    Hospitalization in the last 30 days (Readmission):  No    If yes, Readmission Assessment in CM Navigator will be completed.    Advance Directives:      Code Status: Full Code   Patient's Primary Decision Maker is: Legal Next of Kin    Primary Decision Maker: TONNY MONSON - Spouse - [phone]    Discharge Planning:    Patient lives with: Spouse/Significant Other Type of Home: House  Primary Care Giver: Self  Patient Support Systems include: Spouse/Significant Other   Current Financial resources: Medicare  Current community resources: None  Current services prior to admission: None            Current DME:              Type of Home Care services:  None    ADLS  Prior functional level: Independent in ADLs/IADLs  Current functional level: Independent in ADLs/IADLs    PT AM-PAC:   /24  OT AM-PAC:   /24    Family can provide assistance at DC: Yes  Would you like Case Management to discuss the discharge plan with any other family members/significant others, and if so, who? Yes (wife)  Plans to Return to Present Housing: Yes  Other Identified Issues/Barriers to RETURNING to current housing:   Potential Assistance needed at discharge: N/A            Potential DME:    Patient expects  to discharge to: House  Plan for transportation at discharge:      Financial    Payor: MEDICARE / Plan: MEDICARE PART A AND B / Product Type: *No Product type* /     Does insurance require precert for SNF: No    Potential assistance Purchasing Medications: No  Meds-to-Beds request:        [id number] [place] [place] [facility] [address] - P [phone] - F [phone] [address]  [facility] [address]  Phone: [phone] Fax: [phone]      Notes:    Factors facilitating achievement of predicted outcomes: Family support, Cooperative, and Pleasant    Barriers to discharge: Decreased endurance    Additional Case Management Notes: Pt is from home with his wife. He reports being IPTA and denies needs. States he is an active , has a PCP and insurance. He is agreeable to a SA resource folder. Will continue to follow course for needs. Dinah Acevedo RN     The Plan for Transition of Care is related to the following treatment goals of A-fib (HCC) [I48.91]  Cirrhosis (HCC) [K74.60]    IF APPLICABLE: The Patient and/or patient representative Jamal and his family were provided with a choice of provider and agrees with the discharge plan. Freedom of choice list with basic dialogue that supports the patient's individualized plan of care/goals and shares the quality data associated with the providers was provided to: Patient   Patient Representative Name:       The Patient and/or Patient Representative Agree with the Discharge Plan? Yes    Dinah Acevedo RN  Case Management Department

## 2024-12-05 NOTE — PROGRESS NOTES
Comprehensive Nutrition Assessment    Type and Reason for Visit:  Initial, Positive nutrition screen    Nutrition Recommendations/Plan:   Continue regular diet.   Ensure TID to better meet nutrient needs.   Encourage PO intakes.   Monitor pertinent labs, bowel habits, weight, N/V, supplement acceptance, clinical progression.       Malnutrition Assessment:  Malnutrition Status:  Moderate malnutrition (12/05/24 1252)    Context:  Chronic Illness     Findings of the 6 clinical characteristics of malnutrition:  Energy Intake:  75% or less estimated energy requirements for 1 month or longer  Weight Loss:  Mild weight loss     Body Fat Loss:  Mild body fat loss Orbital, Buccal region   Muscle Mass Loss:  Mild muscle mass loss Temples (temporalis), Clavicles (pectoralis & deltoids)  Fluid Accumulation:  Mild Ascites   Strength:  Not Performed    Nutrition Assessment:    Patient seen due to positive nutrition screen for weight loss and decreased appetite. Admitted for cirrhosis. PMHx of  A-fib, chronic alcohol use, CVA, hypertension, hyperlipidemia. Pt nutritionally compromised AEB decreased appetite, fat/muscle wasting. Patient seen with family in room. Patient reports his appetite has decreased ever since his stroke in May 2023. Wife states pt \"nibbles\" on meals. States his taste is altered due to multiple medications he is on. No issues chewing or swallowing. UBW is 220lbs, reports unintentional weight loss over the past 1-2 years. Pt does not drink any oral nutrition supplements at home, agreeable to trial Ensure while admitted. No N/V at this time. Will continue to monitor.    Nutrition Related Findings:    K 3.0, BUN 6, creatinine 0.7, Mg 1.44, glucose , Ca 8.0, , albumin 3.1. On mag-ox, thiamine. Wound Type: None       Current Nutrition Intake & Therapies:    Average Meal Intake: Unable to assess  Average Supplements Intake: None Ordered  ADULT DIET; Regular    Anthropometric Measures:  Height: 182.9

## 2024-12-05 NOTE — ACP (ADVANCE CARE PLANNING)
Advance Care Planning   General Advance Care Planning (ACP) Conversation    Date of Conversation: 11/26/2024  Conducted with: Patient with Decision Making Capacity  Other persons present: None    Healthcare Decision Maker:    Primary Decision Maker: TONNY MONSON - Spouse - [phone]    Today we documented Decision Maker(s) consistent with Legal Next of Kin hierarchy.  Content/Action Overview:  Has NO ACP documents-Information provided  Reviewed DNR/DNI and patient elects Full Code (Attempt Resuscitation)      Length of Voluntary ACP Conversation in minutes:  <16 minutes (Non-Billable)    Dinah Acevedo RN                  Soft, nontender

## 2024-12-05 NOTE — DISCHARGE SUMMARY
V2.0  Discharge Summary    Name:  Jamal Hutchison /Age/Sex: 1950 (74 y.o. male)   Admit Date: 2024  Discharge Date: 24    MRN & CSN:  [id number] & [id number] Encounter Date and Time 24 10:40 AM EST    Attending:  Octavio Echevarria MD Discharging Provider: Clay Magallon DO       Hospital Course:     Brief HPI: Jamal Hutchison is a 74 y.o. male with pmh of A-fib, hypertension, hyperlipidemia, history of CVA, and chronic alcohol use who presents with new diagnosis of cirrhosis. Patient was scheduled for cardiac ablation procedure today with EP for history of his A-fib. Prior to having this procedure done, an echo was completed which did show a echodensity and lucency in the subcostal view which suggested CT abdomen to further classify. CT abdomen indicated that patient had significant liver cirrhosis with mild abdominal and pelvic ascites as well as splenorenal shunt and hazy groundglass opacities in the right middle lobe. He was admitted for liver workup.    Brief Problem Based Course:     Cirrhosis  -Noted on CT imaging with mild abdominal pelvic ascites as well as a splenorenal shunt  -Likely secondary to chronic alcohol use  -GI consulted  -ALT 19 and AST 43, INR 2.05, AFP pending, chronic liver disease serology pending, right upper quadrant ultrasound consistent with cirrhosis  -CBC 1 week after discharge     A-fib  -Patient originally scheduled for ablation but canceled due to findings of cirrhosis  -Continue diltiazem for rate control and Eliquis for anticoagulation  -EP to schedule ablation outpatient      Hypertension  -Stable  -Continue diltiazem upon discharge     Hyperlipidemia  -Continue atorvastatin upon discharge     History of CVA  -Continue home aspirin upon discharge     Chronic alcohol use  -Encouraged alcohol cessation  -Continue daily thiamine supplementation upon discharge    The patient expressed appropriate understanding of, and agreement with the discharge recommendations,  Results   Component Value Date/Time    CHOL 176 05/15/2023 06:22 AM    HDL 44 05/15/2023 06:22 AM    TRIG 211 05/15/2023 06:22 AM     Hemoglobin A1C:   Lab Results   Component Value Date/Time    LABA1C 4.8 05/15/2023 06:22 AM     TSH:   Lab Results   Component Value Date/Time    TSH 3.82 05/15/2023 06:22 AM     Troponin: No results found for: \"TROPONINT\"  Lactic Acid: No results for input(s): \"LACTA\" in the last 72 hours.  BNP: No results for input(s): \"PROBNP\" in the last 72 hours.  UA:No results found for: \"NITRU\", \"COLORU\", \"PHUR\", \"LABCAST\", \"WBCUA\", \"RBCUA\", \"MUCUS\", \"TRICHOMONAS\", \"YEAST\", \"BACTERIA\", \"CLARITYU\", \"SPECGRAV\", \"LEUKOCYTESUR\", \"UROBILINOGEN\", \"BILIRUBINUR\", \"BLOODU\", \"GLUCOSEU\", \"KETUA\", \"AMORPHOUS\"  Urine Cultures: No results found for: \"LABURIN\"  Blood Cultures: No results found for: \"BC\"  No results found for: \"BLOODCULT2\"  Organism: No results found for: \"ORG\"    Time Spent Discharging patient 45 minutes    Electronically signed by Clay Magallon DO on 12/5/2024 at 2:09 PM     Clay Magallon DO, PGY-2  [facility]

## 2024-12-06 LAB
A1AT SERPL-MCNC: 164 MG/DL (ref 90–200)
AFP-TM SERPL-MCNC: 3.4 UG/L
ALBUMIN SERPL ELPH-MCNC: 3.3 G/DL (ref 3.1–4.9)
ALPHA1 GLOB SERPL ELPH-MCNC: 0.3 G/DL (ref 0.2–0.4)
ALPHA2 GLOB SERPL ELPH-MCNC: 0.5 G/DL (ref 0.4–1.1)
B-GLOBULIN SERPL ELPH-MCNC: 0.9 G/DL (ref 0.9–1.6)
CERULOPLASMIN SERPL-MCNC: 22 MG/DL (ref 15–30)
GAMMA GLOB SERPL ELPH-MCNC: 1.4 G/DL (ref 0.6–1.8)
SMA IGG SER-ACNC: 16 UNITS (ref 0–19)
SPE/IFE INTERPRETATION: NORMAL

## 2024-12-08 LAB
IRON SATN MFR SERPL: 6 % (ref 20–50)
IRON SERPL-MCNC: 21 UG/DL (ref 59–158)
TIBC SERPL-MCNC: 328 UG/DL (ref 260–445)
VIT B1 SERPL-MCNC: 4 NMOL/L (ref 4–15)

## 2024-12-10 LAB — MITOCHONDRIA M2 AB SER IA-ACNC: 1.3 U/ML (ref 0–4)

## 2024-12-18 LAB — ECHO BSA: 2.14 M2

## 2024-12-19 RX ORDER — CLOPIDOGREL BISULFATE 75 MG/1
75 TABLET ORAL DAILY
Qty: 90 TABLET | Refills: 1 | Status: SHIPPED | OUTPATIENT
Start: 2024-12-19

## 2025-01-03 ENCOUNTER — TELEPHONE (OUTPATIENT)
Dept: CARDIOLOGY CLINIC | Age: 75
End: 2025-01-03

## 2025-01-03 NOTE — TELEPHONE ENCOUNTER
Jamal Hutchison, 1950    Cardiac Risk Assessment    What type of procedure are you having?  EGD     When is your procedure scheduled for?  Not scheduled yet     Medications to be stopped.  Requesting eliquis be stopped 1 day prior to procedure    What physician is performing your procedure?  N/A     Phone Number:   [phone]    Fax number to send the letter:   [phone] Attn: Teresa     Cardiologist:   GONSALO ELKINS     Last Appointment:   11/26/24    Next Appointment:   02/27/25    
Letter created and faxed.   
Please help me generate a letter.  Patient at low risk for MACE during a low risk procedure.  No further cardiac testing indicated.  Ok to hold OAC however while off OAC risk for CVA increases however risk benefits favor holding.  Please make sure patient aware.  
As per palliative care order, all tachy detections and alarms turned off.  Pt is DNR

## 2025-01-04 NOTE — CONSULTS
CONSULTATION  Jamal Hutchison is a 74 y.o. male asked to see us in consultation by  Main Velazco III, MD & Octavio Echevarria MD for evaluation of cirrhosis.      Mr. Hutchison is a 74 year old male with past medical history of a-fib on eliquis, CVA, HTN, HLD, colon polyps and chronic alcohol use who presented for outpatient cardiac ablation.  A pre procedure echocardiogram noted an echodensity and lucency in the subcostal views for which a CTap was recommended to further evaluation.  Ct demonstrated cirrhotic appearing liver and mild abdominal and pelvic ascites and a splenorenal shunt, as well as probable severe SMA narrowing.  Due to findings of cirrhosis his procedure was cancelled and he was admitted with GI consult.    The patient does not have a history of liver disease, nor a family history.  He reports drinking 4-5 \"light\" beers daily for years.  He denies LE swelling, nausea, vomiting, jaundice and blood in the stool.  His last colonoscopy 4/2023 Dr. Hdz demonstrated several precancerous polyps. He is due for repeat 2026.    His platelet count is on the lower end of normal. He was thrombocytopenic in May 2023.  No recent INR or LFTs.  Normocytic anemia is noted which appears new.      Medications Prior to Admission: apixaban (ELIQUIS) 5 MG TABS tablet, Take 1 tablet by mouth 2 times daily  dilTIAZem (CARDIZEM CD) 240 MG extended release capsule, TAKE 1 CAPSULE BY MOUTH EVERY DAY IN THE MORNING AND AT BEDTIME  pantoprazole (PROTONIX) 40 MG tablet, Take 1 tablet by mouth 2 times daily  clopidogrel (PLAVIX) 75 MG tablet, TAKE 1 TABLET BY MOUTH EVERY DAY  magnesium oxide (MAG-OX) 400 MG tablet, Take 1 tablet by mouth daily  atorvastatin (LIPITOR) 80 MG tablet, Take 1 tablet by mouth nightly (Patient taking differently: Take 1 tablet by mouth nightly Pt taking 1/2 tab)  Multiple Vitamins-Minerals (THERAPEUTIC  Removed intact from lad record.        Electronically signed by: SMILEY Santos 12/4/2024 4:32 PM           (Office) [phone]  (Fax) [phone]  Available via perfect serve

## 2025-01-27 ENCOUNTER — HOSPITAL ENCOUNTER (OUTPATIENT)
Dept: VASCULAR LAB | Age: 75
Discharge: HOME | End: 2025-01-29
Attending: SURGERY
Payer: MEDICARE

## 2025-01-27 DIAGNOSIS — I65.23 BILATERAL CAROTID ARTERY STENOSIS: ICD-10-CM

## 2025-01-27 LAB
VAS LEFT ARM BP: 136 MMHG
VAS LEFT CCA DIST EDV: 17.8 CM/S
VAS LEFT CCA DIST PSV: 214 CM/S
VAS LEFT CCA MID EDV: 11 CM/S
VAS LEFT CCA MID PSV: 92.5 CM/S
VAS LEFT CCA PROX EDV: 16.5 CM/S
VAS LEFT CCA PROX PSV: 113 CM/S
VAS LEFT ECA PSV: 106 CM/S
VAS LEFT ICA DIST EDV: 19.9 CM/S
VAS LEFT ICA DIST PSV: 113 CM/S
VAS LEFT ICA MID EDV: 21.7 CM/S
VAS LEFT ICA MID PSV: 123 CM/S
VAS LEFT ICA PROX EDV: 19.6 CM/S
VAS LEFT ICA PROX PSV: 103 CM/S
VAS LEFT SUBCLAVIAN PROX PSV: 158 CM/S
VAS LEFT VERTEBRAL EDV: 9.32 CM/S
VAS LEFT VERTEBRAL PSV: 87.6 CM/S
VAS RIGHT ARM BP: 142 MMHG
VAS RIGHT CCA DIST EDV: 14.1 CM/S
VAS RIGHT CCA DIST PSV: 108 CM/S
VAS RIGHT CCA MID EDV: 12.5 CM/S
VAS RIGHT CCA MID PSV: 86.2 CM/S
VAS RIGHT CCA PROX EDV: 11.8 CM/S
VAS RIGHT CCA PROX PSV: 87.8 CM/S
VAS RIGHT ECA PSV: 170 CM/S
VAS RIGHT ICA DIST EDV: 18 CM/S
VAS RIGHT ICA DIST PSV: 118 CM/S
VAS RIGHT ICA MID EDV: 23.5 CM/S
VAS RIGHT ICA MID PSV: 118 CM/S
VAS RIGHT ICA PROX EDV: 27.4 CM/S
VAS RIGHT ICA PROX PSV: 126 CM/S
VAS RIGHT ICA/CCA PSV: 1.46
VAS RIGHT SUBCLAVIAN PROX PSV: 228 CM/S
VAS RIGHT VERTEBRAL EDV: 9.02 CM/S
VAS RIGHT VERTEBRAL PSV: 49.4 CM/S

## 2025-01-27 PROCEDURE — 93880 EXTRACRANIAL BILAT STUDY: CPT

## 2025-01-27 PROCEDURE — 93880 EXTRACRANIAL BILAT STUDY: CPT | Performed by: SURGERY

## 2025-01-28 ENCOUNTER — TELEPHONE (OUTPATIENT)
Dept: VASCULAR SURGERY | Age: 75
End: 2025-01-28

## 2025-01-28 DIAGNOSIS — I65.23 BILATERAL CAROTID ARTERY STENOSIS: Primary | ICD-10-CM

## 2025-01-28 NOTE — TELEPHONE ENCOUNTER
Called patient regarding test results per Dr Castaneda. Carotid  scan looks good and repeat in a year. Pamritu

## 2025-02-26 NOTE — PROGRESS NOTES
[facility]   Electrophysiology Outpatient Note              Date:  February 27, 2025  Patient name: Jamal Hutchison  YOB: 1950    Primary Care physician: Main Velazco III, MD    HISTORY OF PRESENT ILLNESS: The patient is a 74 y.o.  male with a history of paroxysmal atrial fibrillation, stroke, critical carotid stenosis, alcohol abuse, hyperlipidemia, hypertension    Patient was seen in [facility] 5/14/2023 with stroke.  Patient was found to be in atrial fibrillation.  He spontaneously converted to sinus rhythm.  Echo EF 55-60%.  MRI brain revealed right punctate parietal lobe infarct.  CTA revealed critical stenosis of the proximal left cervical ICA.  Vascular planning for carotid endarterectomy as outpatient on 5/25/2023.  Patient went into rapid atrial fibrillation at that time diltiazem drip was started.  Patient wore cardiac event monitor from 5/16/2023-6/11/2023 demonstrating predominantly atrial fibrillation (87% burden) with average heart rate of 93 bpm.  Patient was seen in EP clinic 8/22/2023 ECG revealed atrial fibrillation rate 95 and patient reporting constant fatigue.  His diltiazem was increased and treatment options for atrial fibrillation were discussed at length.    3/1/2024 he was seen for PAF. ECG shows atrial fibrillation with a HR of 106. Patient is accompanied by his wife today.  Patient denies chest pain and palpitations.  On 2/27/2024 patient underwent angioplasty and stenting of his distal left superficial femoral artery and popliteal angioplasty for severe claudication and PVD. He is wearing a Fitbit and heart rate is 106 currently which matches to his ECG today.  After reviewing his medications with him and his wife.  He has not been taking his diltiazem twice daily.  States that his PCP told him to only take it once a day due to some stomach discomfort.  His PCP also discontinued his Lipitor due to stomach discomfort.  Patient

## 2025-02-27 ENCOUNTER — OFFICE VISIT (OUTPATIENT)
Dept: CARDIOLOGY CLINIC | Age: 75
End: 2025-02-27

## 2025-02-27 VITALS
DIASTOLIC BLOOD PRESSURE: 40 MMHG | WEIGHT: 172.4 LBS | OXYGEN SATURATION: 99 % | BODY MASS INDEX: 23.35 KG/M2 | HEART RATE: 79 BPM | HEIGHT: 72 IN | SYSTOLIC BLOOD PRESSURE: 108 MMHG

## 2025-02-27 DIAGNOSIS — I48.19 PERSISTENT ATRIAL FIBRILLATION (HCC): Primary | ICD-10-CM

## 2025-02-27 DIAGNOSIS — K70.30 ALCOHOLIC CIRRHOSIS OF LIVER WITHOUT ASCITES (HCC): ICD-10-CM

## 2025-02-27 DIAGNOSIS — R06.09 DYSPNEA ON EXERTION: ICD-10-CM

## 2025-02-27 DIAGNOSIS — I48.91 ATRIAL FIBRILLATION WITH RAPID VENTRICULAR RESPONSE (HCC): ICD-10-CM

## 2025-02-27 NOTE — PATIENT INSTRUCTIONS
Continue Diltiazem 240 mg twice daily  Continue Eliquis 5 mg twice daily for stroke risk reduction  Continue Lipitor 40 mg daily  Check BP and HR daily --call with any abnormal readings  Will discuss atrial ablation with Dr Murry    Follow up juan be determined

## 2025-03-05 ENCOUNTER — TELEPHONE (OUTPATIENT)
Dept: CARDIOLOGY CLINIC | Age: 75
End: 2025-03-05

## 2025-03-05 DIAGNOSIS — I48.91 ATRIAL FIBRILLATION, TRANSIENT (HCC): ICD-10-CM

## 2025-03-05 DIAGNOSIS — I48.91 ATRIAL FIBRILLATION WITH RAPID VENTRICULAR RESPONSE (HCC): Primary | ICD-10-CM

## 2025-03-05 NOTE — TELEPHONE ENCOUNTER
Spoke with patient's spouse and relayed message. Please call spouse (Juana) to reschedule AF ablation per AGK.       Last dose Eliquis night before.  Hold multivitamin, magnesium, and thiamine morning of.

## 2025-03-05 NOTE — TELEPHONE ENCOUNTER
----- Message from Dr. HERMINIA Murry MD sent at 3/4/2025  4:53 PM EST -----  If cleared by GI, ok to reschedule.  Thanks team.  ----- Message -----  From: Tiffanie Contreras APRN - CNP  Sent: 3/3/2025   2:32 PM EST  To: HERMINIA Murry Jr., MD    Hi,    This is a gentleman that we share in common and has history of alcoholic cirrhosis.  Patient has stopped drinking since 12/2024.  He was scheduled to do atrial fibrillation ablation with you back in December however patient was found to have some ascites --procedure was cancelled and GI was consulted.  He followed up with them as an outpatient.  Patient and wife were seen in my outpatient clinic last week and states that  has cleared them to proceed with atrial fibrillation ablation.  Patient would like to know if it is okay to get him on the schedule to proceed.      Thank you sooo much!    Tiffanie

## 2025-03-06 PROBLEM — I48.91 ATRIAL FIBRILLATION, TRANSIENT (HCC): Status: ACTIVE | Noted: 2025-03-05

## 2025-03-06 NOTE — TELEPHONE ENCOUNTER
Procedure:  Afib Ablation  Doctor:  Dr. Murry  Date:  4/9/25  Time:  8:30am  Arrival:  7am  Reps:  Carto  Anesthesia:  Yes      Spoke with patient. Please have patient arrive to the main entrance of [facility] ([address] [place], [place] [id number]) and check in with the registration desk.  They will be directed to the Cath Lab.  Remind patient to be NPO after midnight (8 hours prior). Do not apply lotions/creams on skin the day of procedure.

## 2025-04-09 ENCOUNTER — HOSPITAL ENCOUNTER (OUTPATIENT)
Age: 75
Setting detail: OBSERVATION
Discharge: HOME | End: 2025-04-10
Attending: INTERNAL MEDICINE | Admitting: INTERNAL MEDICINE
Payer: MEDICARE

## 2025-04-09 ENCOUNTER — ANESTHESIA EVENT (OUTPATIENT)
Dept: CARDIAC CATH/INVASIVE PROCEDURES | Age: 75
End: 2025-04-09
Payer: MEDICARE

## 2025-04-09 ENCOUNTER — ANESTHESIA (OUTPATIENT)
Dept: CARDIAC CATH/INVASIVE PROCEDURES | Age: 75
End: 2025-04-09
Payer: MEDICARE

## 2025-04-09 DIAGNOSIS — I48.91 ATRIAL FIBRILLATION, TRANSIENT (HCC): ICD-10-CM

## 2025-04-09 PROBLEM — I48.19 ATRIAL FIBRILLATION, PERSISTENT (HCC): Status: ACTIVE | Noted: 2025-04-09

## 2025-04-09 LAB
ABO/RH: NORMAL
ANION GAP SERPL CALCULATED.3IONS-SCNC: 14 MMOL/L (ref 3–16)
ANTIBODY SCREEN: NORMAL
BUN SERPL-MCNC: 15 MG/DL (ref 7–20)
CALCIUM SERPL-MCNC: 9.1 MG/DL (ref 8.3–10.6)
CHLORIDE SERPL-SCNC: 99 MMOL/L (ref 99–110)
CO2 SERPL-SCNC: 20 MMOL/L (ref 21–32)
CREAT SERPL-MCNC: 1.4 MG/DL (ref 0.8–1.3)
DEPRECATED RDW RBC AUTO: 20.9 % (ref 12.4–15.4)
ECHO BSA: 2.04 M2
EKG DIAGNOSIS: NORMAL
EKG Q-T INTERVAL: 400 MS
EKG QRS DURATION: 88 MS
EKG QTC CALCULATION (BAZETT): 446 MS
EKG R AXIS: 28 DEGREES
EKG T AXIS: 40 DEGREES
EKG VENTRICULAR RATE: 75 BPM
GFR SERPLBLD CREATININE-BSD FMLA CKD-EPI: 53 ML/MIN/{1.73_M2}
GLUCOSE SERPL-MCNC: 102 MG/DL (ref 70–99)
HCT VFR BLD AUTO: 30.6 % (ref 40.5–52.5)
HGB BLD-MCNC: 9.5 G/DL (ref 13.5–17.5)
MCH RBC QN AUTO: 20.3 PG (ref 26–34)
MCHC RBC AUTO-ENTMCNC: 31 G/DL (ref 31–36)
MCV RBC AUTO: 65.6 FL (ref 80–100)
PATH INTERP BLD-IMP: NORMAL
PATH INTERP BLD-IMP: YES
PLATELET # BLD AUTO: 218 K/UL (ref 135–450)
PLATELET BLD QL SMEAR: ADEQUATE
PMV BLD AUTO: 8.7 FL (ref 5–10.5)
POC ACT LR: >400 SEC
POC ACT LR: >400 SEC
POTASSIUM SERPL-SCNC: 4.1 MMOL/L (ref 3.5–5.1)
RBC # BLD AUTO: 4.66 M/UL (ref 4.2–5.9)
SLIDE REVIEW: ABNORMAL
SODIUM SERPL-SCNC: 133 MMOL/L (ref 136–145)
WBC # BLD AUTO: 7.2 K/UL (ref 4–11)

## 2025-04-09 PROCEDURE — 7100000001 HC PACU RECOVERY - ADDTL 15 MIN: Performed by: INTERNAL MEDICINE

## 2025-04-09 PROCEDURE — 93010 ELECTROCARDIOGRAM REPORT: CPT | Performed by: INTERNAL MEDICINE

## 2025-04-09 PROCEDURE — C1893 INTRO/SHEATH, FIXED,NON-PEEL: HCPCS | Performed by: INTERNAL MEDICINE

## 2025-04-09 PROCEDURE — C1894 INTRO/SHEATH, NON-LASER: HCPCS | Performed by: INTERNAL MEDICINE

## 2025-04-09 PROCEDURE — C1769 GUIDE WIRE: HCPCS | Performed by: INTERNAL MEDICINE

## 2025-04-09 PROCEDURE — 6370000000 HC RX 637 (ALT 250 FOR IP): Performed by: INTERNAL MEDICINE

## 2025-04-09 PROCEDURE — 80048 BASIC METABOLIC PNL TOTAL CA: CPT

## 2025-04-09 PROCEDURE — 86901 BLOOD TYPING SEROLOGIC RH(D): CPT

## 2025-04-09 PROCEDURE — 85027 COMPLETE CBC AUTOMATED: CPT

## 2025-04-09 PROCEDURE — 86900 BLOOD TYPING SEROLOGIC ABO: CPT

## 2025-04-09 PROCEDURE — 93623 PRGRMD STIMJ&PACG IV RX NFS: CPT | Performed by: INTERNAL MEDICINE

## 2025-04-09 PROCEDURE — C1732 CATH, EP, DIAG/ABL, 3D/VECT: HCPCS | Performed by: INTERNAL MEDICINE

## 2025-04-09 PROCEDURE — 2580000003 HC RX 258

## 2025-04-09 PROCEDURE — 85347 COAGULATION TIME ACTIVATED: CPT

## 2025-04-09 PROCEDURE — 86850 RBC ANTIBODY SCREEN: CPT

## 2025-04-09 PROCEDURE — 93656 COMPRE EP EVAL ABLTJ ATR FIB: CPT | Performed by: INTERNAL MEDICINE

## 2025-04-09 PROCEDURE — C1760 CLOSURE DEV, VASC: HCPCS | Performed by: INTERNAL MEDICINE

## 2025-04-09 PROCEDURE — 93622 COMP EP EVAL L VENTR PAC&REC: CPT | Performed by: INTERNAL MEDICINE

## 2025-04-09 PROCEDURE — 2500000003 HC RX 250 WO HCPCS: Performed by: INTERNAL MEDICINE

## 2025-04-09 PROCEDURE — 6360000002 HC RX W HCPCS

## 2025-04-09 PROCEDURE — 3700000001 HC ADD 15 MINUTES (ANESTHESIA): Performed by: INTERNAL MEDICINE

## 2025-04-09 PROCEDURE — 2580000003 HC RX 258: Performed by: INTERNAL MEDICINE

## 2025-04-09 PROCEDURE — 7100000011 HC PHASE II RECOVERY - ADDTL 15 MIN: Performed by: INTERNAL MEDICINE

## 2025-04-09 PROCEDURE — G0378 HOSPITAL OBSERVATION PER HR: HCPCS

## 2025-04-09 PROCEDURE — 6360000002 HC RX W HCPCS: Performed by: INTERNAL MEDICINE

## 2025-04-09 PROCEDURE — 93005 ELECTROCARDIOGRAM TRACING: CPT | Performed by: INTERNAL MEDICINE

## 2025-04-09 PROCEDURE — 7100000010 HC PHASE II RECOVERY - FIRST 15 MIN: Performed by: INTERNAL MEDICINE

## 2025-04-09 PROCEDURE — 7100000000 HC PACU RECOVERY - FIRST 15 MIN: Performed by: INTERNAL MEDICINE

## 2025-04-09 PROCEDURE — 3700000000 HC ANESTHESIA ATTENDED CARE: Performed by: INTERNAL MEDICINE

## 2025-04-09 PROCEDURE — C1766 INTRO/SHEATH,STRBLE,NON-PEEL: HCPCS | Performed by: INTERNAL MEDICINE

## 2025-04-09 PROCEDURE — 2720000010 HC SURG SUPPLY STERILE: Performed by: INTERNAL MEDICINE

## 2025-04-09 PROCEDURE — C1759 CATH, INTRA ECHOCARDIOGRAPHY: HCPCS | Performed by: INTERNAL MEDICINE

## 2025-04-09 PROCEDURE — 2500000003 HC RX 250 WO HCPCS

## 2025-04-09 PROCEDURE — 2709999900 HC NON-CHARGEABLE SUPPLY: Performed by: INTERNAL MEDICINE

## 2025-04-09 RX ORDER — ONDANSETRON 2 MG/ML
4 INJECTION INTRAMUSCULAR; INTRAVENOUS ONCE
Status: DISCONTINUED | OUTPATIENT
Start: 2025-04-09 | End: 2025-04-09 | Stop reason: HOSPADM

## 2025-04-09 RX ORDER — OXYCODONE HYDROCHLORIDE 5 MG/1
5 TABLET ORAL PRN
Status: DISCONTINUED | OUTPATIENT
Start: 2025-04-09 | End: 2025-04-09 | Stop reason: HOSPADM

## 2025-04-09 RX ORDER — FUROSEMIDE 20 MG/1
20 TABLET ORAL DAILY
Status: DISCONTINUED | OUTPATIENT
Start: 2025-04-09 | End: 2025-04-10 | Stop reason: HOSPADM

## 2025-04-09 RX ORDER — OXYCODONE AND ACETAMINOPHEN 5; 325 MG/1; MG/1
1 TABLET ORAL EVERY 4 HOURS PRN
Refills: 0 | Status: DISCONTINUED | OUTPATIENT
Start: 2025-04-09 | End: 2025-04-10 | Stop reason: HOSPADM

## 2025-04-09 RX ORDER — ASPIRIN 81 MG/1
81 TABLET, CHEWABLE ORAL DAILY
Status: ON HOLD | COMMUNITY

## 2025-04-09 RX ORDER — LANOLIN ALCOHOL/MO/W.PET/CERES
100 CREAM (GRAM) TOPICAL DAILY
Status: DISCONTINUED | OUTPATIENT
Start: 2025-04-09 | End: 2025-04-10 | Stop reason: HOSPADM

## 2025-04-09 RX ORDER — DEXAMETHASONE SODIUM PHOSPHATE 10 MG/ML
INJECTION, SOLUTION INTRA-ARTICULAR; INTRALESIONAL; INTRAMUSCULAR; INTRAVENOUS; SOFT TISSUE
Status: DISCONTINUED | OUTPATIENT
Start: 2025-04-09 | End: 2025-04-09 | Stop reason: SDUPTHER

## 2025-04-09 RX ORDER — SODIUM CHLORIDE 0.9 % (FLUSH) 0.9 %
5-40 SYRINGE (ML) INJECTION EVERY 12 HOURS SCHEDULED
Status: DISCONTINUED | OUTPATIENT
Start: 2025-04-09 | End: 2025-04-09 | Stop reason: HOSPADM

## 2025-04-09 RX ORDER — PHENYLEPHRINE HCL IN 0.9% NACL 1 MG/10 ML
SYRINGE (ML) INTRAVENOUS
Status: DISCONTINUED | OUTPATIENT
Start: 2025-04-09 | End: 2025-04-09 | Stop reason: SDUPTHER

## 2025-04-09 RX ORDER — BUPIVACAINE HYDROCHLORIDE 5 MG/ML
INJECTION, SOLUTION EPIDURAL; INTRACAUDAL PRN
Status: DISCONTINUED | OUTPATIENT
Start: 2025-04-09 | End: 2025-04-09 | Stop reason: HOSPADM

## 2025-04-09 RX ORDER — FENTANYL CITRATE 50 UG/ML
INJECTION, SOLUTION INTRAMUSCULAR; INTRAVENOUS
Status: DISCONTINUED | OUTPATIENT
Start: 2025-04-09 | End: 2025-04-09 | Stop reason: SDUPTHER

## 2025-04-09 RX ORDER — ASPIRIN 81 MG/1
81 TABLET, CHEWABLE ORAL DAILY
Status: DISCONTINUED | OUTPATIENT
Start: 2025-04-09 | End: 2025-04-10 | Stop reason: HOSPADM

## 2025-04-09 RX ORDER — HEPARIN SODIUM 10000 [USP'U]/100ML
INJECTION, SOLUTION INTRAVENOUS CONTINUOUS PRN
Status: DISCONTINUED | OUTPATIENT
Start: 2025-04-09 | End: 2025-04-09 | Stop reason: HOSPADM

## 2025-04-09 RX ORDER — ACETAMINOPHEN 325 MG/1
650 TABLET ORAL EVERY 4 HOURS PRN
Status: DISCONTINUED | OUTPATIENT
Start: 2025-04-09 | End: 2025-04-10 | Stop reason: HOSPADM

## 2025-04-09 RX ORDER — LANOLIN ALCOHOL/MO/W.PET/CERES
400 CREAM (GRAM) TOPICAL DAILY
Status: DISCONTINUED | OUTPATIENT
Start: 2025-04-09 | End: 2025-04-10 | Stop reason: HOSPADM

## 2025-04-09 RX ORDER — FUROSEMIDE 20 MG/1
20 TABLET ORAL DAILY
Status: ON HOLD | COMMUNITY

## 2025-04-09 RX ORDER — ROCURONIUM BROMIDE 10 MG/ML
INJECTION, SOLUTION INTRAVENOUS
Status: DISCONTINUED | OUTPATIENT
Start: 2025-04-09 | End: 2025-04-09 | Stop reason: SDUPTHER

## 2025-04-09 RX ORDER — SODIUM CHLORIDE 0.9 % (FLUSH) 0.9 %
5-40 SYRINGE (ML) INJECTION PRN
Status: DISCONTINUED | OUTPATIENT
Start: 2025-04-09 | End: 2025-04-09 | Stop reason: HOSPADM

## 2025-04-09 RX ORDER — ONDANSETRON 2 MG/ML
INJECTION INTRAMUSCULAR; INTRAVENOUS
Status: DISCONTINUED | OUTPATIENT
Start: 2025-04-09 | End: 2025-04-09 | Stop reason: SDUPTHER

## 2025-04-09 RX ORDER — COLCHICINE 0.6 MG/1
0.6 TABLET ORAL DAILY
Status: DISCONTINUED | OUTPATIENT
Start: 2025-04-09 | End: 2025-04-10 | Stop reason: HOSPADM

## 2025-04-09 RX ORDER — LIDOCAINE HYDROCHLORIDE 20 MG/ML
INJECTION, SOLUTION INFILTRATION; PERINEURAL
Status: DISCONTINUED | OUTPATIENT
Start: 2025-04-09 | End: 2025-04-09 | Stop reason: SDUPTHER

## 2025-04-09 RX ORDER — PROPOFOL 10 MG/ML
INJECTION, EMULSION INTRAVENOUS
Status: DISCONTINUED | OUTPATIENT
Start: 2025-04-09 | End: 2025-04-09 | Stop reason: SDUPTHER

## 2025-04-09 RX ORDER — SODIUM CHLORIDE 9 MG/ML
INJECTION, SOLUTION INTRAVENOUS PRN
Status: DISCONTINUED | OUTPATIENT
Start: 2025-04-09 | End: 2025-04-09 | Stop reason: HOSPADM

## 2025-04-09 RX ORDER — NALOXONE HYDROCHLORIDE 0.4 MG/ML
INJECTION, SOLUTION INTRAMUSCULAR; INTRAVENOUS; SUBCUTANEOUS PRN
Status: DISCONTINUED | OUTPATIENT
Start: 2025-04-09 | End: 2025-04-09 | Stop reason: HOSPADM

## 2025-04-09 RX ORDER — MEPERIDINE HYDROCHLORIDE 50 MG/ML
12.5 INJECTION INTRAMUSCULAR; INTRAVENOUS; SUBCUTANEOUS EVERY 5 MIN PRN
Status: DISCONTINUED | OUTPATIENT
Start: 2025-04-09 | End: 2025-04-09 | Stop reason: HOSPADM

## 2025-04-09 RX ORDER — ONDANSETRON 2 MG/ML
4 INJECTION INTRAMUSCULAR; INTRAVENOUS EVERY 6 HOURS PRN
Status: DISCONTINUED | OUTPATIENT
Start: 2025-04-09 | End: 2025-04-09 | Stop reason: HOSPADM

## 2025-04-09 RX ORDER — FUROSEMIDE 10 MG/ML
INJECTION INTRAMUSCULAR; INTRAVENOUS
Status: DISCONTINUED | OUTPATIENT
Start: 2025-04-09 | End: 2025-04-09 | Stop reason: SDUPTHER

## 2025-04-09 RX ORDER — PROTAMINE SULFATE 10 MG/ML
INJECTION, SOLUTION INTRAVENOUS
Status: DISCONTINUED | OUTPATIENT
Start: 2025-04-09 | End: 2025-04-09 | Stop reason: SDUPTHER

## 2025-04-09 RX ORDER — PANTOPRAZOLE SODIUM 40 MG/1
40 TABLET, DELAYED RELEASE ORAL 2 TIMES DAILY
Status: DISCONTINUED | OUTPATIENT
Start: 2025-04-09 | End: 2025-04-10 | Stop reason: HOSPADM

## 2025-04-09 RX ORDER — SODIUM CHLORIDE 9 MG/ML
INJECTION, SOLUTION INTRAVENOUS
Status: DISCONTINUED | OUTPATIENT
Start: 2025-04-09 | End: 2025-04-09 | Stop reason: SDUPTHER

## 2025-04-09 RX ORDER — AMIODARONE HYDROCHLORIDE 200 MG/1
400 TABLET ORAL DAILY
Status: DISCONTINUED | OUTPATIENT
Start: 2025-04-09 | End: 2025-04-10 | Stop reason: HOSPADM

## 2025-04-09 RX ORDER — DIPHENHYDRAMINE HYDROCHLORIDE 50 MG/ML
6.25 INJECTION, SOLUTION INTRAMUSCULAR; INTRAVENOUS
Status: DISCONTINUED | OUTPATIENT
Start: 2025-04-09 | End: 2025-04-09 | Stop reason: HOSPADM

## 2025-04-09 RX ORDER — LORAZEPAM 0.5 MG/1
0.5 TABLET ORAL
Status: DISCONTINUED | OUTPATIENT
Start: 2025-04-09 | End: 2025-04-09 | Stop reason: HOSPADM

## 2025-04-09 RX ORDER — SUCRALFATE 1 G/1
1 TABLET ORAL EVERY 8 HOURS SCHEDULED
Status: DISCONTINUED | OUTPATIENT
Start: 2025-04-09 | End: 2025-04-10 | Stop reason: HOSPADM

## 2025-04-09 RX ORDER — OXYCODONE HYDROCHLORIDE 5 MG/1
10 TABLET ORAL PRN
Status: DISCONTINUED | OUTPATIENT
Start: 2025-04-09 | End: 2025-04-09 | Stop reason: HOSPADM

## 2025-04-09 RX ORDER — ATORVASTATIN CALCIUM 40 MG/1
40 TABLET, FILM COATED ORAL NIGHTLY
Status: DISCONTINUED | OUTPATIENT
Start: 2025-04-09 | End: 2025-04-10 | Stop reason: HOSPADM

## 2025-04-09 RX ORDER — DILTIAZEM HYDROCHLORIDE 120 MG/1
120 CAPSULE, COATED, EXTENDED RELEASE ORAL DAILY
Status: DISCONTINUED | OUTPATIENT
Start: 2025-04-10 | End: 2025-04-10 | Stop reason: HOSPADM

## 2025-04-09 RX ORDER — SODIUM CHLORIDE 0.9 % (FLUSH) 0.9 %
5-40 SYRINGE (ML) INJECTION PRN
Status: DISCONTINUED | OUTPATIENT
Start: 2025-04-09 | End: 2025-04-10 | Stop reason: HOSPADM

## 2025-04-09 RX ORDER — MIDAZOLAM HYDROCHLORIDE 1 MG/ML
2 INJECTION, SOLUTION INTRAMUSCULAR; INTRAVENOUS
Status: DISCONTINUED | OUTPATIENT
Start: 2025-04-09 | End: 2025-04-09 | Stop reason: HOSPADM

## 2025-04-09 RX ORDER — SPIRONOLACTONE 50 MG/1
50 TABLET, FILM COATED ORAL DAILY
Status: ON HOLD | COMMUNITY

## 2025-04-09 RX ORDER — SODIUM CHLORIDE 0.9 % (FLUSH) 0.9 %
5-40 SYRINGE (ML) INJECTION EVERY 12 HOURS SCHEDULED
Status: DISCONTINUED | OUTPATIENT
Start: 2025-04-09 | End: 2025-04-10 | Stop reason: HOSPADM

## 2025-04-09 RX ORDER — SPIRONOLACTONE 25 MG/1
50 TABLET ORAL DAILY
Status: DISCONTINUED | OUTPATIENT
Start: 2025-04-10 | End: 2025-04-10 | Stop reason: HOSPADM

## 2025-04-09 RX ORDER — HEPARIN SODIUM 1000 [USP'U]/ML
INJECTION, SOLUTION INTRAVENOUS; SUBCUTANEOUS PRN
Status: DISCONTINUED | OUTPATIENT
Start: 2025-04-09 | End: 2025-04-09 | Stop reason: HOSPADM

## 2025-04-09 RX ORDER — SODIUM CHLORIDE 9 MG/ML
INJECTION, SOLUTION INTRAVENOUS PRN
Status: DISCONTINUED | OUTPATIENT
Start: 2025-04-09 | End: 2025-04-10 | Stop reason: HOSPADM

## 2025-04-09 RX ADMIN — Medication 80 MCG: at 08:49

## 2025-04-09 RX ADMIN — ATORVASTATIN CALCIUM 40 MG: 40 TABLET, FILM COATED ORAL at 19:47

## 2025-04-09 RX ADMIN — FUROSEMIDE 60 MG: 10 INJECTION, SOLUTION INTRAMUSCULAR; INTRAVENOUS at 10:24

## 2025-04-09 RX ADMIN — PHENYLEPHRINE HYDROCHLORIDE 60 MCG/MIN: 10 INJECTION INTRAVENOUS at 08:55

## 2025-04-09 RX ADMIN — OXYCODONE AND ACETAMINOPHEN 1 TABLET: 5; 325 TABLET ORAL at 23:36

## 2025-04-09 RX ADMIN — FENTANYL CITRATE 50 MCG: 50 INJECTION, SOLUTION INTRAMUSCULAR; INTRAVENOUS at 08:11

## 2025-04-09 RX ADMIN — PANTOPRAZOLE SODIUM 40 MG: 40 TABLET, DELAYED RELEASE ORAL at 19:47

## 2025-04-09 RX ADMIN — PROTAMINE SULFATE 50 MG: 10 INJECTION, SOLUTION INTRAVENOUS at 10:25

## 2025-04-09 RX ADMIN — ONDANSETRON 4 MG: 2 INJECTION INTRAMUSCULAR; INTRAVENOUS at 10:01

## 2025-04-09 RX ADMIN — Medication 100 MG: at 18:14

## 2025-04-09 RX ADMIN — DEXAMETHASONE SODIUM PHOSPHATE 4 MG: 10 INJECTION INTRAMUSCULAR; INTRAVENOUS at 08:20

## 2025-04-09 RX ADMIN — Medication 80 MCG: at 08:14

## 2025-04-09 RX ADMIN — Medication 80 MCG: at 08:48

## 2025-04-09 RX ADMIN — Medication 40 MCG: at 08:47

## 2025-04-09 RX ADMIN — AMIODARONE HYDROCHLORIDE 400 MG: 200 TABLET ORAL at 18:14

## 2025-04-09 RX ADMIN — SUCRALFATE 1 G: 1 TABLET ORAL at 20:44

## 2025-04-09 RX ADMIN — SUGAMMADEX 400 MG: 100 INJECTION, SOLUTION INTRAVENOUS at 10:02

## 2025-04-09 RX ADMIN — Medication 40 MCG: at 08:46

## 2025-04-09 RX ADMIN — LIDOCAINE HYDROCHLORIDE 60 MG: 20 INJECTION, SOLUTION INFILTRATION; PERINEURAL at 08:11

## 2025-04-09 RX ADMIN — ASPIRIN 81 MG: 81 TABLET, CHEWABLE ORAL at 18:14

## 2025-04-09 RX ADMIN — SODIUM CHLORIDE: 9 INJECTION, SOLUTION INTRAVENOUS at 08:03

## 2025-04-09 RX ADMIN — COLCHICINE 0.6 MG: 0.6 TABLET ORAL at 18:14

## 2025-04-09 RX ADMIN — OXYCODONE AND ACETAMINOPHEN 1 TABLET: 5; 325 TABLET ORAL at 13:42

## 2025-04-09 RX ADMIN — PROPOFOL 150 MG: 10 INJECTION, EMULSION INTRAVENOUS at 08:11

## 2025-04-09 RX ADMIN — ROCURONIUM BROMIDE 50 MG: 50 INJECTION, SOLUTION INTRAVENOUS at 08:12

## 2025-04-09 RX ADMIN — Medication 10 ML: at 19:48

## 2025-04-09 RX ADMIN — APIXABAN 5 MG: 5 TABLET, FILM COATED ORAL at 20:44

## 2025-04-09 RX ADMIN — Medication 400 MG: at 18:14

## 2025-04-09 RX ADMIN — FENTANYL CITRATE 25 MCG: 50 INJECTION, SOLUTION INTRAMUSCULAR; INTRAVENOUS at 10:31

## 2025-04-09 ASSESSMENT — PAIN SCALES - GENERAL
PAINLEVEL_OUTOF10: 0
PAINLEVEL_OUTOF10: 8
PAINLEVEL_OUTOF10: 7
PAINLEVEL_OUTOF10: 8
PAINLEVEL_OUTOF10: 0

## 2025-04-09 ASSESSMENT — PAIN DESCRIPTION - DESCRIPTORS: DESCRIPTORS: POUNDING;STABBING

## 2025-04-09 ASSESSMENT — PAIN DESCRIPTION - ORIENTATION: ORIENTATION: RIGHT

## 2025-04-09 ASSESSMENT — PAIN DESCRIPTION - LOCATION: LOCATION: GROIN

## 2025-04-09 NOTE — H&P
[facility]   Cardiology Admission Note              Date:   4/9/2025  Patient name: Jamal Hutchison  Date of admission:  4/9/2025  6:51 AM  MRN:   [id number]  YOB: 1950    Primary Care physician: Main Velazco III, MD    Reason for Admission:  atrial fibrillation    CHIEF COMPLAINT:  Atrial fibrillation      History Obtained From:  patient    HISTORY OF PRESENT ILLNESS:    Patient is a pleasant 74-year-old male with a medical history significant for atrial fibrillation with RVR, peripheral vascular disease, cerebrovascular disease, hypertension, and hyperlipidemia who presents home for atrial fibrillation ablation.  Patient been compliant with his oral anticoagulation without any missed doses.    Past Medical History:   has a past medical history of Atrial fibrillation with rapid ventricular response (HCC), CAD (coronary artery disease), CVA (cerebral vascular accident) (HCC), Hearing loss, Hyperlipidemia, Hypertension, and TIA (transient ischemic attack).    Past Surgical History:   has a past surgical history that includes hernia repair; Colonoscopy; Tibia fracture surgery (Left); and Carotid endarterectomy (Left, 5/25/2023).     Home Medications:    Prior to Admission medications    Medication Sig Start Date End Date Taking? Authorizing Provider   spironolactone (ALDACTONE) 50 MG tablet Take 1 tablet by mouth daily   Yes Yoko Ahuja MD   furosemide (LASIX) 20 MG tablet Take 1 tablet by mouth daily   Yes Yoko Ahuja MD   aspirin 81 MG chewable tablet Take 1 tablet by mouth daily   Yes ProviderYoko MD   thiamine 100 MG tablet Take 1 tablet by mouth daily 12/5/24  Yes Lion Tinoco DO   apixaban (ELIQUIS) 5 MG TABS tablet Take 1 tablet by mouth 2 times daily 11/26/24  Yes HERMINIA Murry Jr., MD   dilTIAZem (CARDIZEM CD) 240 MG extended release capsule TAKE 1 CAPSULE BY MOUTH EVERY DAY IN THE MORNING AND AT BEDTIME 7/29/24  Yes HERMINIA Murry  No change in gait, balance, coordination, mood, affect, memory, mentation, behavior.  Psychiatric: No anxiety, or depression.  Endocrine: No temperature intolerance. No excessive thirst, fluid intake, or urination. No tremor.  Hematologic/Lymphatic: No abnormal bruising or bleeding, blood clots or swollen lymph nodes.  Allergic/Immunologic: No nasal congestion or hives.    PHYSICAL EXAM:    Physical Examination:    Vital Signs:           Blood pressure 131/64, pulse 81, temperature 96.8 °F (36 °C), temperature source Temporal, resp. rate 11, height 1.829 m (6'), weight 81.6 kg (179 lb 12.8 oz), SpO2 100%.  Temp  Av.8 °F (36 °C)  Min: 96.8 °F (36 °C)  Max: 96.8 °F (36 °C)  Pulse  Av  Min: 81  Max: 89  BP  Min: 131/64  Max: 138/54  SpO2  Av %  Min: 100 %  Max: 100 %    Admission Weight:  Weight - Scale: 81.6 kg (179 lb 12.8 oz)      I/O:     Intake/Output Summary (Last 24 hours) at 2025 1057  Last data filed at 2025 1011  Gross per 24 hour   Intake 600 ml   Output 5 ml   Net 595 ml            Vent Settings:        Constitutional and General Appearance: alert, cooperative, no distress, and appears stated age  HEENT: PERRL, no cervical lymphadenopathy. No masses palpable. Normal oral mucosa  Respiratory:  Normal excursion and expansion without use of accessory muscles  Resp Auscultation: Normal breath sounds without dullness or wheezing  Cardiovascular:  The apical impulse is not displaced  Irregular rate and rhythm w/o M/G/R  Peripheral pulses are symmetrical and full   Abdomen:  No masses or tenderness  Bowel sounds present  Extremities:   No Cyanosis or Clubbing   Lower extremity edema: No   Skin: Warm and dry  Neurological:  Alert and oriented.  Moves all extremities well  No abnormalities of mood, affect, memory, mentation, or behavior are noted    DATA:    CARDIOLOGY LABS:   CBC:   Recent Labs     25  0740   WBC 7.2   HGB 9.5*   HCT 30.6*        BMP:   Recent Labs

## 2025-04-09 NOTE — FLOWSHEET NOTE
04/09/25 1615   Vital Signs   Temp 97.5 °F (36.4 °C)   Temp Source Oral   Pulse 87   Heart Rate Source Monitor   Respirations 18   BP (!) 142/71   MAP (Calculated) 95   BP Location Right upper arm   Pain Assessment   Pain Assessment None - Denies Pain   Pain Level 0   Patient's Stated Pain Goal 0 - No pain   Oxygen Therapy   SpO2 100 %   O2 Device None (Room air)     Patient admitted to room 447 from Cath lab.  Patient oriented to room, call light, bed rails, phone, lights and bathroom.  Patient instructed about the schedule of the day including: vital sign frequency, lab draws, possible tests, frequency of MD and staff rounds, including RN/MD rounding together at bedside, daily weights, and I &O's.  Patient instructed about prescribed diet, how to use 8MENU, and television.  No bed alarm in place, patient aware of placement and reason.  Yes Telemetry box in place, patient aware of placement and reason.  Bed locked, in lowest position, side rails up 2/4, call light within reach.  Will continue to monitor.

## 2025-04-09 NOTE — PROGRESS NOTES
Right groin site noted to have increased drainage on dressing. Pressure held for five minutes. Will continue to assess for changes.

## 2025-04-09 NOTE — PROGRESS NOTES
Patient arrived to PACU bay 5, phase one initiated. Placed on bedside monitor, VSS. Report obtained from OR RN and anesthesia. Patient on O2 via room air. Assessment WNL. Right groin site stable. Warm blankets applied. Side rails in place, will monitor patient closely.

## 2025-04-09 NOTE — PROGRESS NOTES
Patient transported back to cath lab recovery bay 6. Patient connected to monitor. VSS on room air. Bedside report given to KATHYA Larkin. Groin site verified with both nurses at this time.

## 2025-04-09 NOTE — PLAN OF CARE
Problem: Discharge Planning  Goal: Discharge to home or other facility with appropriate resources  4/9/2025 1736 by Anastasiia Buckley RN  Outcome: Progressing  4/9/2025 1736 by Anastasiia Buckley RN  Outcome: Progressing     Problem: Chronic Conditions and Co-morbidities  Goal: Patient's chronic conditions and co-morbidity symptoms are monitored and maintained or improved  4/9/2025 1736 by Anastasiia Buckley RN  Outcome: Progressing  4/9/2025 1736 by Anastasiia Buckley RN  Outcome: Progressing     Problem: Skin/Tissue Integrity  Goal: Skin integrity remains intact  Description: 1.  Monitor for areas of redness and/or skin breakdown  2.  Assess vascular access sites hourly  3.  Every 4-6 hours minimum:  Change oxygen saturation probe site  4.  Every 4-6 hours:  If on nasal continuous positive airway pressure, respiratory therapy assess nares and determine need for appliance change or resting period  Outcome: Progressing

## 2025-04-09 NOTE — ANESTHESIA PRE PROCEDURE
Department of Anesthesiology  Preprocedure Note       Name:  Jamal Hutchison   Age:  74 y.o.  :  1950                                          MRN:  [id number]         Date:  2025      Surgeon: Surgeon(s):  HERMINIA Murry Jr., MD    Procedure: Procedure(s):  Ablation A-fib w complete ep study    Medications prior to admission:   Prior to Admission medications    Medication Sig Start Date End Date Taking? Authorizing Provider   clopidogrel (PLAVIX) 75 MG tablet TAKE 1 TABLET BY MOUTH EVERY DAY  Patient not taking: Reported on 24   Nathan Castaneda MD   atorvastatin (LIPITOR) 40 MG tablet Take 1 tablet by mouth nightly 24   Lion Tinoco DO   thiamine 100 MG tablet Take 1 tablet by mouth daily 24   Lion Tinoco DO   apixaban (ELIQUIS) 5 MG TABS tablet Take 1 tablet by mouth 2 times daily 24   HERMINIA Murry Jr., MD   dilTIAZem (CARDIZEM CD) 240 MG extended release capsule TAKE 1 CAPSULE BY MOUTH EVERY DAY IN THE MORNING AND AT BEDTIME 24   HERMINIA Murry Jr., MD   pantoprazole (PROTONIX) 40 MG tablet Take 1 tablet by mouth 2 times daily 24   Tiffanie Contreras APRN - CNP   magnesium oxide (MAG-OX) 400 MG tablet Take 1 tablet by mouth daily 23   Linda Michele APRN - CNP   Multiple Vitamins-Minerals (THERAPEUTIC MULTIVITAMIN-MINERALS) tablet Take 1 tablet by mouth daily  Patient not taking: Reported on 2025    ProviderYook MD       Current medications:    Current Facility-Administered Medications   Medication Dose Route Frequency Provider Last Rate Last Admin   • sodium chloride flush 0.9 % injection 5-40 mL  5-40 mL IntraVENous 2 times per day HERMINIA Murry Jr., MD       • sodium chloride flush 0.9 % injection 5-40 mL  5-40 mL IntraVENous PRN HERMINIA Murry Jr., MD       • 0.9 % sodium chloride infusion   IntraVENous PRN HERMINIA Murry Jr., MD       • LORazepam (ATIVAN) tablet 0.5 mg  0.5 mg Oral Once PRN HERMINIA Murry

## 2025-04-10 VITALS
WEIGHT: 175.6 LBS | RESPIRATION RATE: 18 BRPM | SYSTOLIC BLOOD PRESSURE: 143 MMHG | OXYGEN SATURATION: 100 % | DIASTOLIC BLOOD PRESSURE: 61 MMHG | TEMPERATURE: 98.2 F | HEART RATE: 93 BPM | BODY MASS INDEX: 23.78 KG/M2 | HEIGHT: 72 IN

## 2025-04-10 LAB
ALBUMIN SERPL-MCNC: 3.5 G/DL (ref 3.4–5)
ALP SERPL-CCNC: 125 U/L (ref 40–129)
ALT SERPL-CCNC: 22 U/L (ref 10–40)
ANION GAP SERPL CALCULATED.3IONS-SCNC: 9 MMOL/L (ref 3–16)
AST SERPL-CCNC: 30 U/L (ref 15–37)
BILIRUB DIRECT SERPL-MCNC: 0.6 MG/DL (ref 0–0.3)
BILIRUB INDIRECT SERPL-MCNC: 0.2 MG/DL (ref 0–1)
BILIRUB SERPL-MCNC: 0.8 MG/DL (ref 0–1)
BUN SERPL-MCNC: 17 MG/DL (ref 7–20)
CALCIUM SERPL-MCNC: 8.1 MG/DL (ref 8.3–10.6)
CHLORIDE SERPL-SCNC: 98 MMOL/L (ref 99–110)
CO2 SERPL-SCNC: 21 MMOL/L (ref 21–32)
CREAT SERPL-MCNC: 1.3 MG/DL (ref 0.8–1.3)
DEPRECATED RDW RBC AUTO: 20.3 % (ref 12.4–15.4)
EKG ATRIAL RATE: 87 BPM
EKG DIAGNOSIS: NORMAL
EKG P AXIS: 62 DEGREES
EKG P-R INTERVAL: 192 MS
EKG Q-T INTERVAL: 398 MS
EKG QRS DURATION: 90 MS
EKG QTC CALCULATION (BAZETT): 478 MS
EKG R AXIS: 36 DEGREES
EKG T AXIS: 60 DEGREES
EKG VENTRICULAR RATE: 87 BPM
GFR SERPLBLD CREATININE-BSD FMLA CKD-EPI: 57 ML/MIN/{1.73_M2}
GLUCOSE SERPL-MCNC: 115 MG/DL (ref 70–99)
HCT VFR BLD AUTO: 24 % (ref 40.5–52.5)
HGB BLD-MCNC: 7.5 G/DL (ref 13.5–17.5)
MAGNESIUM SERPL-MCNC: 1.86 MG/DL (ref 1.8–2.4)
MCH RBC QN AUTO: 20.1 PG (ref 26–34)
MCHC RBC AUTO-ENTMCNC: 31 G/DL (ref 31–36)
MCV RBC AUTO: 64.9 FL (ref 80–100)
PATH INTERP BLD-IMP: NO
PLATELET # BLD AUTO: 131 K/UL (ref 135–450)
PMV BLD AUTO: 8.5 FL (ref 5–10.5)
POTASSIUM SERPL-SCNC: 4.1 MMOL/L (ref 3.5–5.1)
PROT SERPL-MCNC: 5.2 G/DL (ref 6.4–8.2)
RBC # BLD AUTO: 3.71 M/UL (ref 4.2–5.9)
SODIUM SERPL-SCNC: 128 MMOL/L (ref 136–145)
TSH SERPL DL<=0.005 MIU/L-ACNC: 1.26 UIU/ML (ref 0.27–4.2)
WBC # BLD AUTO: 6.8 K/UL (ref 4–11)

## 2025-04-10 PROCEDURE — G0378 HOSPITAL OBSERVATION PER HR: HCPCS

## 2025-04-10 PROCEDURE — 80048 BASIC METABOLIC PNL TOTAL CA: CPT

## 2025-04-10 PROCEDURE — 93005 ELECTROCARDIOGRAM TRACING: CPT | Performed by: INTERNAL MEDICINE

## 2025-04-10 PROCEDURE — 85027 COMPLETE CBC AUTOMATED: CPT

## 2025-04-10 PROCEDURE — 36415 COLL VENOUS BLD VENIPUNCTURE: CPT

## 2025-04-10 PROCEDURE — 84443 ASSAY THYROID STIM HORMONE: CPT

## 2025-04-10 PROCEDURE — 93010 ELECTROCARDIOGRAM REPORT: CPT | Performed by: INTERNAL MEDICINE

## 2025-04-10 PROCEDURE — 80076 HEPATIC FUNCTION PANEL: CPT

## 2025-04-10 PROCEDURE — 83735 ASSAY OF MAGNESIUM: CPT

## 2025-04-10 PROCEDURE — 6370000000 HC RX 637 (ALT 250 FOR IP): Performed by: INTERNAL MEDICINE

## 2025-04-10 RX ORDER — AMIODARONE HYDROCHLORIDE 200 MG/1
TABLET ORAL
Qty: 44 TABLET | Refills: 3 | Status: ON HOLD | OUTPATIENT
Start: 2025-04-11

## 2025-04-10 RX ORDER — DILTIAZEM HYDROCHLORIDE 120 MG/1
120 CAPSULE, COATED, EXTENDED RELEASE ORAL DAILY
Qty: 30 CAPSULE | Refills: 3 | Status: ON HOLD | OUTPATIENT
Start: 2025-04-11

## 2025-04-10 RX ORDER — SUCRALFATE 1 G/1
1 TABLET ORAL EVERY 8 HOURS SCHEDULED
Qty: 42 TABLET | Refills: 0 | Status: ON HOLD | OUTPATIENT
Start: 2025-04-10 | End: 2025-04-24

## 2025-04-10 RX ORDER — COLCHICINE 0.6 MG/1
0.6 TABLET ORAL DAILY
Qty: 10 TABLET | Refills: 0 | Status: ON HOLD | OUTPATIENT
Start: 2025-04-11 | End: 2025-04-21

## 2025-04-10 RX ADMIN — DILTIAZEM HYDROCHLORIDE 120 MG: 120 CAPSULE, EXTENDED RELEASE ORAL at 08:15

## 2025-04-10 RX ADMIN — SPIRONOLACTONE 50 MG: 25 TABLET ORAL at 08:15

## 2025-04-10 RX ADMIN — APIXABAN 5 MG: 5 TABLET, FILM COATED ORAL at 08:15

## 2025-04-10 RX ADMIN — Medication 100 MG: at 08:15

## 2025-04-10 RX ADMIN — SUCRALFATE 1 G: 1 TABLET ORAL at 04:29

## 2025-04-10 RX ADMIN — COLCHICINE 0.6 MG: 0.6 TABLET ORAL at 08:15

## 2025-04-10 RX ADMIN — ASPIRIN 81 MG: 81 TABLET, CHEWABLE ORAL at 08:15

## 2025-04-10 RX ADMIN — Medication 400 MG: at 08:15

## 2025-04-10 RX ADMIN — AMIODARONE HYDROCHLORIDE 400 MG: 200 TABLET ORAL at 08:14

## 2025-04-10 RX ADMIN — FUROSEMIDE 20 MG: 20 TABLET ORAL at 08:15

## 2025-04-10 RX ADMIN — PANTOPRAZOLE SODIUM 40 MG: 40 TABLET, DELAYED RELEASE ORAL at 08:15

## 2025-04-10 NOTE — PLAN OF CARE
Problem: Discharge Planning  Goal: Discharge to home or other facility with appropriate resources  4/10/2025 1317 by Stefanie Pérez, RN  Outcome: Progressing  4/10/2025 0213 by Shauna Lino, RN  Outcome: Progressing

## 2025-04-10 NOTE — DISCHARGE SUMMARY
Patient ID:  Jamal Hutchison  [id number]  74 y.o.  1950    Admit date: 4/9/2025    Discharge date and time: 4/10/2025    Admitting Physician: HERMINIA Murry Jr., MD     Discharge NP: SMILEY Silva-CNP    Admission Diagnoses: Atrial fibrillation, transient (HCC) [I48.91]  Atrial fibrillation, persistent (HCC) [I48.19]    Discharge Diagnoses: SAME    Admission Condition: fair    Discharged Condition: good    Hospital Course: Jamal Hutchison was admitted on 4/10/2025 and had an elective RFCA of AF. Amiodarone was started. Rhythm has been sinus. He complains of occasional chest discomfort. Denies chest pain, palpitations, shortness of breath, and dizziness. Has eaten, ambulated, and voided.       Assessment:   Paroxysmal atrial fibrillation: stable               -[id number] score 5 (age, HTN, CVA, vascular disease)   -s/p RFCA of AF 4/9/2025  History of CVA  (right punctate parietal lobe infarct) 2023  HTN: overall controlled   HLD  Carotid artery disease s/p CEA   PAD s/p RLE PCI   Alcohol use   -no longer drinks  Abnormal liver enzymes     Plan:   Continue Eliquis  Amiodarone 400 mg PO daily for two weeks then 200 mg PO daily (will check TSH and LFT's).  Not an ideal option given history of elevated liver enzymes but intend to only use short-term as he recovers from ablation.  Will address in follow-up.  Decrease Cardizem to 120 mg PO daily   Continue BID pantoprazole   Colchicine 0.6 mg daily x 10 days post ablation   Carafate 1 gram PO QID x 2 weeks post ablation   Post procedure instructions reviewed  Follow up in office on 7/1/2025      Discharge Exam:  BP (!) 143/61   Pulse 93   Temp 98.2 °F (36.8 °C) (Oral)   Resp 18   Ht 1.829 m (6')   Wt 79.7 kg (175 lb 9.6 oz)   SpO2 100%   BMI 23.82 kg/m²     General Appearance:    Alert, cooperative, no distress, appears stated age   Head:    Normocephalic, without obvious abnormality, atraumatic   Eyes:    PERRL, conjunctiva/corneas clear, EOM's intact      daily     spironolactone 50 MG tablet  Commonly known as: ALDACTONE     therapeutic multivitamin-minerals tablet     thiamine 100 MG tablet  Take 1 tablet by mouth daily              Post procedure instructions given  Activity: as tolerated  Diet: cardiac diet    SMILEY Silva-CNP  [facility]  [phone]     Time spent on discharge of patient: 35 minutes, including plan of care, patient education, and care coordination

## 2025-04-10 NOTE — PLAN OF CARE
Problem: Discharge Planning  Goal: Discharge to home or other facility with appropriate resources  4/10/2025 0213 by Shauna Lino, RN  Outcome: Progressing     Problem: Chronic Conditions and Co-morbidities  Goal: Patient's chronic conditions and co-morbidity symptoms are monitored and maintained or improved  4/10/2025 0213 by Shauna Lino, RN  Outcome: Progressing     Problem: Skin/Tissue Integrity  Goal: Skin integrity remains intact  Description: 1.  Monitor for areas of redness and/or skin breakdown  2.  Assess vascular access sites hourly  3.  Every 4-6 hours minimum:  Change oxygen saturation probe site  4.  Every 4-6 hours:  If on nasal continuous positive airway pressure, respiratory therapy assess nares and determine need for appliance change or resting period  4/10/2025 0213 by Shauna Lino, RN  Outcome: Progressing     Problem: ABCDS Injury Assessment  Goal: Absence of physical injury  Outcome: Progressing     Problem: Safety - Adult  Goal: Free from fall injury  Outcome: Progressing     Problem: Pain  Goal: Verbalizes/displays adequate comfort level or baseline comfort level  Outcome: Progressing

## 2025-04-10 NOTE — PROGRESS NOTES
Discharge instructions reviewed with patient.  All questions answered at this time.   Tele box and IV removed. Patient escorted out via wheelchair to go home with wife

## 2025-04-10 NOTE — ACP (ADVANCE CARE PLANNING)
Advance Care Planning   Advance Care Planning Clinical Specialist  Conversation Note      Date of ACP Conversation: 4/10/2025    Conversation Conducted with:   Patient with Decision Making Capacity   Healthcare Decision Maker: Named in Advance Directive or Healthcare Power of  ashley Arias    ACP Clinical Specialist: Sabine Boudreaux RN      *When Decision Maker makes decisions on behalf of the incapacitated patient: Decision Maker is asked to consider and make decisions based on patient values, known preferences, or best interests.       Current Designated Health Care Decision Maker:   Primary Decision Maker: TONNY MONSON - Spouse - [phone]  (as entered in ACP Activity Healthcare Decision Maker field.  Validate  this information as still accurate & up-to-date; edit Healthcare Decision Maker field as needed.)   conducting conversation with Health Care Decision Maker, substitute \"he\" and \"his\" for \"you\" and \"your\".      Hospitalization  If your health were to worsen and it became clear that your chance of recovery was unlikely, what would your preferences be regarding hospitalization?:    Choice:  [x]  The patient would want hospitalization  []  The patient would prefer comfort-focused treatment without hospitalization.    Ventilation  If you were in your present state of health and suddenly became very ill and were unable to breathe on your own, what would your preference be about the use of a ventilator (breathing machine) if it were available to you?      If patient would desire the use of a ventilator (breathing machine), answer \"yes\", if not \"no\":yes    If your health were to worsen and it became clear that your chance of recovery was unlikely, would that change your answer? Yes    Resuscitation  CPR works best to restart the heart when there is a sudden event, like a heart attack, in someone who is otherwise healthy. Unfortunately, CPR does not typically restart the heart for people who have  serious health conditions or who are very sick.    In the event your heart stopped, would you want attempts to restart your heart (answer \"yes\") or would you prefer a natural death (answer \"no\")? yes    If your health were to worsen and it became clear that your chance of recovery was unlikely, would that change your answer? Yes    [x] Yes  [] No   Educated Patient / Decision Maker regarding differences between Advance Directives and portable DNR orders.    Length of ACP Conversation in minutes:  15 minutes    Conversation Outcomes:  [x] ACP discussion completed  [] Existing advance directive reviewed with patient; no changes to patient's previously recorded wishes   [] New Advance Directive completed   [] Portable Do Not Rescitate prepared for Provider review and signature  [] POLST/POST/MOLST/MOST prepared for Provider review and signature      Follow-up plan:    [] Schedule follow-up conversation to continue planning  [] Referred individual to Provider for additional questions/concerns   [] Advised patient/agent/surrogate to review completed ACP document and update if needed with changes in condition, patient preferences or care setting     [] This note routed to one or more involved healthcare providers     Sabine Boudreaux, RN

## 2025-04-16 ENCOUNTER — TELEPHONE (OUTPATIENT)
Dept: CARDIOLOGY CLINIC | Age: 75
End: 2025-04-16

## 2025-04-16 ENCOUNTER — APPOINTMENT (OUTPATIENT)
Dept: CT IMAGING | Age: 75
DRG: 309 | End: 2025-04-16
Payer: MEDICARE

## 2025-04-16 ENCOUNTER — HOSPITAL ENCOUNTER (INPATIENT)
Age: 75
LOS: 5 days | Discharge: HOME | DRG: 309 | End: 2025-04-21
Attending: STUDENT IN AN ORGANIZED HEALTH CARE EDUCATION/TRAINING PROGRAM | Admitting: STUDENT IN AN ORGANIZED HEALTH CARE EDUCATION/TRAINING PROGRAM
Payer: MEDICARE

## 2025-04-16 ENCOUNTER — RESULTS FOLLOW-UP (OUTPATIENT)
Dept: CARDIOLOGY | Age: 75
End: 2025-04-16

## 2025-04-16 ENCOUNTER — APPOINTMENT (OUTPATIENT)
Dept: GENERAL RADIOLOGY | Age: 75
DRG: 309 | End: 2025-04-16
Payer: MEDICARE

## 2025-04-16 DIAGNOSIS — I48.92 ATRIAL FLUTTER WITH RAPID VENTRICULAR RESPONSE (HCC): Primary | ICD-10-CM

## 2025-04-16 DIAGNOSIS — K92.1 MELENA: ICD-10-CM

## 2025-04-16 PROBLEM — I48.91 ATRIAL FIBRILLATION WITH RVR (HCC): Status: ACTIVE | Noted: 2025-04-16

## 2025-04-16 LAB
ALBUMIN SERPL-MCNC: 3.9 G/DL (ref 3.4–5)
ALBUMIN/GLOB SERPL: 2 {RATIO} (ref 1.1–2.2)
ALP SERPL-CCNC: 151 U/L (ref 40–129)
ALT SERPL-CCNC: 25 U/L (ref 10–40)
ANION GAP SERPL CALCULATED.3IONS-SCNC: 11 MMOL/L (ref 3–16)
AST SERPL-CCNC: 37 U/L (ref 15–37)
BASE EXCESS BLDV CALC-SCNC: -3.2 MMOL/L (ref -3–3)
BASOPHILS # BLD: 0 K/UL (ref 0–0.2)
BASOPHILS NFR BLD: 0.5 %
BILIRUB SERPL-MCNC: 1.6 MG/DL (ref 0–1)
BILIRUB UR QL STRIP.AUTO: NEGATIVE
BUN SERPL-MCNC: 18 MG/DL (ref 7–20)
CALCIUM SERPL-MCNC: 8.8 MG/DL (ref 8.3–10.6)
CHLORIDE SERPL-SCNC: 98 MMOL/L (ref 99–110)
CLARITY UR: CLEAR
CO2 BLDV-SCNC: 19 MMOL/L
CO2 SERPL-SCNC: 20 MMOL/L (ref 21–32)
COHGB MFR BLDV: 6.4 % (ref 0–1.5)
COLOR UR: YELLOW
CREAT SERPL-MCNC: 1.3 MG/DL (ref 0.8–1.3)
DEPRECATED RDW RBC AUTO: 20.3 % (ref 12.4–15.4)
EKG ATRIAL RATE: 288 BPM
EKG DIAGNOSIS: NORMAL
EKG Q-T INTERVAL: 338 MS
EKG QRS DURATION: 84 MS
EKG QTC CALCULATION (BAZETT): 467 MS
EKG R AXIS: 20 DEGREES
EKG T AXIS: 59 DEGREES
EKG VENTRICULAR RATE: 115 BPM
EOSINOPHIL # BLD: 0.1 K/UL (ref 0–0.6)
EOSINOPHIL NFR BLD: 0.7 %
FLUAV RNA RESP QL NAA+PROBE: NOT DETECTED
FLUBV RNA RESP QL NAA+PROBE: NOT DETECTED
GFR SERPLBLD CREATININE-BSD FMLA CKD-EPI: 57 ML/MIN/{1.73_M2}
GLUCOSE SERPL-MCNC: 84 MG/DL (ref 70–99)
GLUCOSE UR STRIP.AUTO-MCNC: NEGATIVE MG/DL
HCO3 BLDV-SCNC: 18.5 MMOL/L (ref 23–29)
HCT VFR BLD AUTO: 28 % (ref 40.5–52.5)
HGB BLD-MCNC: 8.5 G/DL (ref 13.5–17.5)
HGB UR QL STRIP.AUTO: NEGATIVE
KETONES UR STRIP.AUTO-MCNC: NEGATIVE MG/DL
LEUKOCYTE ESTERASE UR QL STRIP.AUTO: NEGATIVE
LYMPHOCYTES # BLD: 0.8 K/UL (ref 1–5.1)
LYMPHOCYTES NFR BLD: 10.4 %
MCH RBC QN AUTO: 20.1 PG (ref 26–34)
MCHC RBC AUTO-ENTMCNC: 30.5 G/DL (ref 31–36)
MCV RBC AUTO: 65.8 FL (ref 80–100)
METHGB MFR BLDV: 0.3 %
MONOCYTES # BLD: 1 K/UL (ref 0–1.3)
MONOCYTES NFR BLD: 13.1 %
NEUTROPHILS # BLD: 6 K/UL (ref 1.7–7.7)
NEUTROPHILS NFR BLD: 75.3 %
NITRITE UR QL STRIP.AUTO: NEGATIVE
NT-PROBNP SERPL-MCNC: 1861 PG/ML (ref 0–449)
O2 THERAPY: ABNORMAL
PATH INTERP BLD-IMP: NO
PCO2 BLDV: 23.5 MMHG (ref 40–50)
PH BLDV: 7.51 [PH] (ref 7.35–7.45)
PH UR STRIP.AUTO: 6 [PH] (ref 5–8)
PLATELET # BLD AUTO: 179 K/UL (ref 135–450)
PMV BLD AUTO: 8.8 FL (ref 5–10.5)
PO2 BLDV: 39.4 MMHG (ref 25–40)
POTASSIUM SERPL-SCNC: 3.9 MMOL/L (ref 3.5–5.1)
PROT SERPL-MCNC: 5.9 G/DL (ref 6.4–8.2)
PROT UR STRIP.AUTO-MCNC: NEGATIVE MG/DL
RBC # BLD AUTO: 4.25 M/UL (ref 4.2–5.9)
REASON FOR REJECTION: NORMAL
REJECTED TEST: NORMAL
SAO2 % BLDV: 77 %
SARS-COV-2 RNA RESP QL NAA+PROBE: NOT DETECTED
SODIUM SERPL-SCNC: 129 MMOL/L (ref 136–145)
SP GR UR STRIP.AUTO: 1.01 (ref 1–1.03)
T4 FREE SERPL-MCNC: 1.3 NG/DL (ref 0.9–1.8)
TROPONIN, HIGH SENSITIVITY: 36 NG/L (ref 0–22)
TROPONIN, HIGH SENSITIVITY: 55 NG/L (ref 0–22)
TSH SERPL DL<=0.005 MIU/L-ACNC: 4.43 UIU/ML (ref 0.27–4.2)
UA COMPLETE W REFLEX CULTURE PNL UR: NORMAL
UA DIPSTICK W REFLEX MICRO PNL UR: NORMAL
URN SPEC COLLECT METH UR: NORMAL
UROBILINOGEN UR STRIP-ACNC: 0.2 E.U./DL
WBC # BLD AUTO: 7.9 K/UL (ref 4–11)

## 2025-04-16 PROCEDURE — 93005 ELECTROCARDIOGRAM TRACING: CPT | Performed by: INTERNAL MEDICINE

## 2025-04-16 PROCEDURE — 6360000004 HC RX CONTRAST MEDICATION: Performed by: STUDENT IN AN ORGANIZED HEALTH CARE EDUCATION/TRAINING PROGRAM

## 2025-04-16 PROCEDURE — 84484 ASSAY OF TROPONIN QUANT: CPT

## 2025-04-16 PROCEDURE — 80053 COMPREHEN METABOLIC PANEL: CPT

## 2025-04-16 PROCEDURE — 2500000003 HC RX 250 WO HCPCS: Performed by: STUDENT IN AN ORGANIZED HEALTH CARE EDUCATION/TRAINING PROGRAM

## 2025-04-16 PROCEDURE — 6370000000 HC RX 637 (ALT 250 FOR IP): Performed by: INTERNAL MEDICINE

## 2025-04-16 PROCEDURE — 36415 COLL VENOUS BLD VENIPUNCTURE: CPT

## 2025-04-16 PROCEDURE — 6370000000 HC RX 637 (ALT 250 FOR IP): Performed by: NURSE PRACTITIONER

## 2025-04-16 PROCEDURE — 83880 ASSAY OF NATRIURETIC PEPTIDE: CPT

## 2025-04-16 PROCEDURE — 82803 BLOOD GASES ANY COMBINATION: CPT

## 2025-04-16 PROCEDURE — 71045 X-RAY EXAM CHEST 1 VIEW: CPT

## 2025-04-16 PROCEDURE — 96374 THER/PROPH/DIAG INJ IV PUSH: CPT

## 2025-04-16 PROCEDURE — 99223 1ST HOSP IP/OBS HIGH 75: CPT | Performed by: INTERNAL MEDICINE

## 2025-04-16 PROCEDURE — 93005 ELECTROCARDIOGRAM TRACING: CPT | Performed by: NURSE PRACTITIONER

## 2025-04-16 PROCEDURE — 2580000003 HC RX 258: Performed by: STUDENT IN AN ORGANIZED HEALTH CARE EDUCATION/TRAINING PROGRAM

## 2025-04-16 PROCEDURE — 2500000003 HC RX 250 WO HCPCS: Performed by: NURSE PRACTITIONER

## 2025-04-16 PROCEDURE — 81003 URINALYSIS AUTO W/O SCOPE: CPT

## 2025-04-16 PROCEDURE — 74174 CTA ABD&PLVS W/CONTRAST: CPT

## 2025-04-16 PROCEDURE — 85025 COMPLETE CBC W/AUTO DIFF WBC: CPT

## 2025-04-16 PROCEDURE — 2580000003 HC RX 258: Performed by: INTERNAL MEDICINE

## 2025-04-16 PROCEDURE — 99285 EMERGENCY DEPT VISIT HI MDM: CPT

## 2025-04-16 PROCEDURE — 93010 ELECTROCARDIOGRAM REPORT: CPT | Performed by: INTERNAL MEDICINE

## 2025-04-16 PROCEDURE — 84439 ASSAY OF FREE THYROXINE: CPT

## 2025-04-16 PROCEDURE — 2060000000 HC ICU INTERMEDIATE R&B

## 2025-04-16 PROCEDURE — 87636 SARSCOV2 & INF A&B AMP PRB: CPT

## 2025-04-16 PROCEDURE — 6360000002 HC RX W HCPCS: Performed by: INTERNAL MEDICINE

## 2025-04-16 PROCEDURE — 84443 ASSAY THYROID STIM HORMONE: CPT

## 2025-04-16 RX ORDER — SODIUM CHLORIDE 9 MG/ML
INJECTION, SOLUTION INTRAVENOUS PRN
Status: DISCONTINUED | OUTPATIENT
Start: 2025-04-16 | End: 2025-04-21 | Stop reason: HOSPADM

## 2025-04-16 RX ORDER — IOPAMIDOL 755 MG/ML
75 INJECTION, SOLUTION INTRAVASCULAR
Status: COMPLETED | OUTPATIENT
Start: 2025-04-16 | End: 2025-04-16

## 2025-04-16 RX ORDER — SODIUM CHLORIDE 9 MG/ML
INJECTION, SOLUTION INTRAVENOUS CONTINUOUS
Status: ACTIVE | OUTPATIENT
Start: 2025-04-16 | End: 2025-04-17

## 2025-04-16 RX ORDER — ATORVASTATIN CALCIUM 40 MG/1
40 TABLET, FILM COATED ORAL NIGHTLY
Status: DISCONTINUED | OUTPATIENT
Start: 2025-04-16 | End: 2025-04-21 | Stop reason: HOSPADM

## 2025-04-16 RX ORDER — ONDANSETRON 2 MG/ML
4 INJECTION INTRAMUSCULAR; INTRAVENOUS EVERY 6 HOURS PRN
Status: DISCONTINUED | OUTPATIENT
Start: 2025-04-16 | End: 2025-04-18

## 2025-04-16 RX ORDER — COLCHICINE 0.6 MG/1
0.6 TABLET ORAL DAILY
Status: DISPENSED | OUTPATIENT
Start: 2025-04-17 | End: 2025-04-21

## 2025-04-16 RX ORDER — ASPIRIN 81 MG/1
81 TABLET, CHEWABLE ORAL DAILY
Status: DISCONTINUED | OUTPATIENT
Start: 2025-04-17 | End: 2025-04-21 | Stop reason: HOSPADM

## 2025-04-16 RX ORDER — DOFETILIDE 0.25 MG/1
250 CAPSULE ORAL EVERY 12 HOURS SCHEDULED
Status: DISCONTINUED | OUTPATIENT
Start: 2025-04-16 | End: 2025-04-18

## 2025-04-16 RX ORDER — ACETAMINOPHEN 325 MG/1
650 TABLET ORAL EVERY 6 HOURS PRN
Status: DISCONTINUED | OUTPATIENT
Start: 2025-04-16 | End: 2025-04-21 | Stop reason: HOSPADM

## 2025-04-16 RX ORDER — ONDANSETRON 4 MG/1
4 TABLET, ORALLY DISINTEGRATING ORAL EVERY 8 HOURS PRN
Status: DISCONTINUED | OUTPATIENT
Start: 2025-04-16 | End: 2025-04-18

## 2025-04-16 RX ORDER — SODIUM CHLORIDE 0.9 % (FLUSH) 0.9 %
5-40 SYRINGE (ML) INJECTION EVERY 12 HOURS SCHEDULED
Status: DISCONTINUED | OUTPATIENT
Start: 2025-04-16 | End: 2025-04-21 | Stop reason: HOSPADM

## 2025-04-16 RX ORDER — ACETAMINOPHEN 650 MG/1
650 SUPPOSITORY RECTAL EVERY 6 HOURS PRN
Status: DISCONTINUED | OUTPATIENT
Start: 2025-04-16 | End: 2025-04-21 | Stop reason: HOSPADM

## 2025-04-16 RX ORDER — PANTOPRAZOLE SODIUM 40 MG/1
40 TABLET, DELAYED RELEASE ORAL 2 TIMES DAILY
Status: DISCONTINUED | OUTPATIENT
Start: 2025-04-16 | End: 2025-04-18

## 2025-04-16 RX ORDER — SODIUM CHLORIDE 0.9 % (FLUSH) 0.9 %
5-40 SYRINGE (ML) INJECTION PRN
Status: DISCONTINUED | OUTPATIENT
Start: 2025-04-16 | End: 2025-04-21 | Stop reason: HOSPADM

## 2025-04-16 RX ORDER — AMIODARONE HYDROCHLORIDE 200 MG/1
200 TABLET ORAL DAILY
Status: DISCONTINUED | OUTPATIENT
Start: 2025-04-17 | End: 2025-04-16

## 2025-04-16 RX ORDER — SPIRONOLACTONE 25 MG/1
50 TABLET ORAL DAILY
Status: DISCONTINUED | OUTPATIENT
Start: 2025-04-17 | End: 2025-04-21 | Stop reason: HOSPADM

## 2025-04-16 RX ORDER — MAGNESIUM SULFATE IN WATER 40 MG/ML
2000 INJECTION, SOLUTION INTRAVENOUS ONCE
Status: COMPLETED | OUTPATIENT
Start: 2025-04-16 | End: 2025-04-16

## 2025-04-16 RX ORDER — DILTIAZEM HYDROCHLORIDE 5 MG/ML
10 INJECTION INTRAVENOUS ONCE
Status: COMPLETED | OUTPATIENT
Start: 2025-04-16 | End: 2025-04-16

## 2025-04-16 RX ORDER — LANOLIN ALCOHOL/MO/W.PET/CERES
100 CREAM (GRAM) TOPICAL DAILY
Status: DISCONTINUED | OUTPATIENT
Start: 2025-04-17 | End: 2025-04-21 | Stop reason: HOSPADM

## 2025-04-16 RX ORDER — SUCRALFATE 1 G/1
1 TABLET ORAL EVERY 8 HOURS SCHEDULED
Status: DISCONTINUED | OUTPATIENT
Start: 2025-04-16 | End: 2025-04-21 | Stop reason: HOSPADM

## 2025-04-16 RX ORDER — POLYETHYLENE GLYCOL 3350 17 G/17G
17 POWDER, FOR SOLUTION ORAL DAILY PRN
Status: DISCONTINUED | OUTPATIENT
Start: 2025-04-16 | End: 2025-04-21 | Stop reason: HOSPADM

## 2025-04-16 RX ADMIN — IOPAMIDOL 75 ML: 755 INJECTION, SOLUTION INTRAVENOUS at 11:17

## 2025-04-16 RX ADMIN — PANTOPRAZOLE SODIUM 40 MG: 40 TABLET, DELAYED RELEASE ORAL at 14:46

## 2025-04-16 RX ADMIN — DILTIAZEM HYDROCHLORIDE 10 MG: 5 INJECTION, SOLUTION INTRAVENOUS at 09:22

## 2025-04-16 RX ADMIN — DOFETILIDE 250 MCG: 0.25 CAPSULE ORAL at 21:17

## 2025-04-16 RX ADMIN — APIXABAN 5 MG: 5 TABLET, FILM COATED ORAL at 21:17

## 2025-04-16 RX ADMIN — PANTOPRAZOLE SODIUM 40 MG: 40 TABLET, DELAYED RELEASE ORAL at 21:17

## 2025-04-16 RX ADMIN — ATORVASTATIN CALCIUM 40 MG: 40 TABLET, FILM COATED ORAL at 21:18

## 2025-04-16 RX ADMIN — ACETAMINOPHEN 650 MG: 325 TABLET ORAL at 21:18

## 2025-04-16 RX ADMIN — SUCRALFATE 1 G: 1 TABLET ORAL at 22:17

## 2025-04-16 RX ADMIN — MAGNESIUM SULFATE HEPTAHYDRATE 2000 MG: 40 INJECTION, SOLUTION INTRAVENOUS at 20:19

## 2025-04-16 RX ADMIN — SODIUM CHLORIDE 2.5 MG/HR: 900 INJECTION, SOLUTION INTRAVENOUS at 23:17

## 2025-04-16 RX ADMIN — SODIUM CHLORIDE: 0.9 INJECTION, SOLUTION INTRAVENOUS at 20:21

## 2025-04-16 RX ADMIN — Medication 10 ML: at 20:22

## 2025-04-16 RX ADMIN — SODIUM CHLORIDE 5 MG/HR: 900 INJECTION, SOLUTION INTRAVENOUS at 09:48

## 2025-04-16 RX ADMIN — APIXABAN 5 MG: 5 TABLET, FILM COATED ORAL at 14:46

## 2025-04-16 ASSESSMENT — PAIN DESCRIPTION - ORIENTATION
ORIENTATION: LEFT
ORIENTATION: MID

## 2025-04-16 ASSESSMENT — PAIN DESCRIPTION - DESCRIPTORS
DESCRIPTORS: ACHING
DESCRIPTORS: ACHING

## 2025-04-16 ASSESSMENT — PAIN SCALES - GENERAL
PAINLEVEL_OUTOF10: 0
PAINLEVEL_OUTOF10: 2
PAINLEVEL_OUTOF10: 3

## 2025-04-16 ASSESSMENT — PAIN DESCRIPTION - LOCATION
LOCATION: CHEST
LOCATION: HEAD

## 2025-04-16 ASSESSMENT — PAIN - FUNCTIONAL ASSESSMENT
PAIN_FUNCTIONAL_ASSESSMENT: 0-10
PAIN_FUNCTIONAL_ASSESSMENT: NONE - DENIES PAIN

## 2025-04-16 NOTE — CONSULTS
Electrophysiology Consultation   Date: 4/16/2025  Admit Date:  4/16/2025  Reason for Consultation: Atrial fibrillation with RVR  Consult Requesting Physician: Lars Mariscal DO     Chief Complaint   Patient presents with    Irregular Heart Beat     Pt had a cardiac ablation last week. Now in a flutter per EMS with a rate of 110-140. Pt has been feeling more tired lately. Having groin and abd pain as well. Feels lightheaded, dizzy, n/v. 3/10 pain     HPI:   Mr. Hutchison is a pleasant 74-year-old male with a medical history significant for atrial fibrillation with RVR status post recent ablation, peripheral vascular disease, cerebrovascular disease, hypertension, alcoholic cirrhosis, and hyperlipidemia who presents home with nausea and failure to thrive post recent ablation.  According to patient he was in his usual state of health until Saturday when he began to suffer from nausea and generalized malaise.  This kept him in bed for quite some time.  He is hopeful he will feel better has the days removed by unfortunately he did not.  He states he had significant change in his appetite and was not able to eat be on his nausea.  At the behest of his wife he decided to come to [facility] for further evaluation and care.    Patient denies tobacco use.  He quit alcohol use.  He denies illicit drug use.    Patient denies fevers, chest pain, orthopnea, PND, lower extremity edema, abdominal swelling, shortness of breath, dyspnea on exertion, chills, visual changes, headaches, sore throat, cough, abdominal pain, nausea, vomiting, bleeding, bruising, dysuria, muscle/joint pain, confusion, depression, anxiety, skin lesions, etc.    Emergency Room/Hospital Course:  Patient was evaluated in the emergency room on 04/26/2025.  His CMP was notable for mild hyponatremia.  His CBC is notable for stable microcytic anemia.  His EKG showed atrial fibrillation/flutter with RVR.  A CT abdomen showed no new significant pathology.  He

## 2025-04-16 NOTE — TELEPHONE ENCOUNTER
Pt's spouse called regarding another encounter. She stated that the pt has been taken to ED by ambulance this morning. She said that he has been feeling sick since Saturday. He has been Dizzy Nauseas and vomiting. He has been having pain under his rib cage down to his groin. Pt is SOB but not struggling to breath. She believes this could all be a side effect from amiodarone (CORDARONE) 200 MG tablet. Please advice.    Subjective:     Patient is a 39 y.o.  male presented with a history of left hip pain. Pateint is being seen for chronic issues of pain and disability with failure of conservative care to date. Patient Active Problem List    Diagnosis Date Noted    Bilateral hip pain 04/26/2022    Primary osteoarthritis of left hip 04/12/2022    Depression 09/09/2020    Hyperglycemia 07/28/2020    Blood loss anemia 07/28/2020    Status post total hip replacement, right 07/27/2020    Essential hypertension     Asthma     Primary osteoarthritis of right hip 06/04/2020    Acute prostatitis 08/06/2019    BMI 50.0-59.9, adult ([facility] [address] 02/23/2017    Left ureteral stone 02/23/2017    Obstructive sleep apnea syndrome 02/23/2017     Past Medical History:   Diagnosis Date    Asthma     Hypertension     Left ureteral calculus     Obesity     Right shoulder pain     Seasonal allergies     Sleep apnea     cpap      Past Surgical History:   Procedure Laterality Date    CYSTOSCOPY  04/20/2017    CYSTOSCOPY LEFT URETEROSCOPY, STONE MANIPULATION AND    TOTAL HIP ARTHROPLASTY Right 7/27/2020    RIGHT TOTAL HIP ARTHROPLASTY ANTERIOR APPROACH performed by Vinay Plummer MD at [facility] [address]      Medications Prior to Admission: aspirin 325 MG tablet, Take 325 mg by mouth daily  meloxicam (MOBIC) 15 MG tablet, TAKE 1 TABLET BY MOUTH EVERY DAY AS NEEDED FOR PAIN  azithromycin (ZITHROMAX) 250 MG tablet, Take 2 tabs (500 mg) on Day 1, and take 1 tab (250 mg) on days 2 through 5. FLUoxetine (PROZAC) 10 MG capsule, TAKE 1 CAPSULE BY MOUTH EVERY DAY  hydroCHLOROthiazide (MICROZIDE) 12.5 MG capsule, TAKE 1 CAPSULE BY MOUTH EVERY DAY  lisinopril (PRINIVIL;ZESTRIL) 20 MG tablet, Take 1 tablet by mouth daily  tiZANidine (ZANAFLEX) 2 MG tablet, Take 1 tablet by mouth every 8 hours as needed (back spasm)  gabapentin (NEURONTIN) 300 MG capsule, Take 1 capsule by mouth 2 times daily for 180 days.   sildenafil (REVATIO) 20 MG tablet, Take 2.5 tablets by mouth daily as needed (ED symptoms)  albuterol sulfate  (90 Base) MCG/ACT inhaler, Inhale 2 puffs into the lungs every 6 hours as needed for Shortness of Breath (may fill either Ventolin or Proair based on insurance.)  triamcinolone (KENALOG) 0.025 % cream, Apply topically 2 times daily. Allergies   Allergen Reactions    Molds & Smuts Itching and Shortness Of Breath    Misc Natural Products      Bees pollens dust      Social History     Tobacco Use    Smoking status: Never Smoker    Smokeless tobacco: Never Used   Substance Use Topics    Alcohol use: Yes     Alcohol/week: 6.0 - 8.0 standard drinks     Types: 6 - 8 Glasses of wine per week     Comment: occ. Family History   Problem Relation Age of Onset    Sleep Apnea Father           Review of Systems  Review of Systems   Constitutional: Negative for chills and fever. HENT: Negative for nosebleeds. Eyes: Negative for double vision. Cardiovascular: Negative for chest pain. Gastrointestinal: Negative for abdominal pain. Musculoskeletal: Positive for joint pain and myalgias. Skin: Negative for rash. Neurological: Negative for seizures. Psychiatric/Behavioral: Negative for hallucinations.          Objective:     Patient Vitals for the past 8 hrs:   BP Temp Temp src Pulse Resp SpO2 Height Weight   05/23/22 1441 138/82 98.4 °F (36.9 °C) Temporal 78 16 98 % 6' (1.829 m) (!) 407 lb (184.6 kg)     /82   Pulse 78   Temp 98.4 °F (36.9 °C) (Temporal)   Resp 16   Ht 6' (1.829 m)   Wt (!) 407 lb (184.6 kg)   SpO2 98%   BMI 55.20 kg/m²     General Appearance:    Alert, cooperative, no distress, appears stated age     Head:    Normocephalic, without obvious abnormality, atraumatic   Eyes:    PERRL, conjunctiva/corneas clear      Ears:    Normal  both ears   Nose:   Nares normal,   mucosa normal, no drainage     Throat:   Lips, mucosa, and tongue normal;   Neck:   Supple, symmetrical, trachea midline, Lungs:      respirations unlabored   Chest Wall:    No tenderness or deformity    Heart:    Regular rate          Abdomen:     Soft, non-tender,     no masses              Extremities:   Extremities  no cyanosis or edema     Pulses:   2+ and symmetric all extremities     Skin:   Skin color, texture, turgor normal         Neurologic:   CNII-XII intact, normal strength, sensation            Assessment:     Active Problems:    * No active hospital problems. *  Resolved Problems:    * No resolved hospital problems. *      Plan:     The various methods of treatment have been discussed with the patient and family. After consideration of risks, benefits and other options for treatment, the patient has consented to surgical interventions (left hip injection  ).

## 2025-04-16 NOTE — ED NOTES
Tele 216 @ 0379  
Pro-BNP 1,861 (*)     All other components within normal limits   TROPONIN - Abnormal; Notable for the following components:    Troponin, High Sensitivity 55 (*)     All other components within normal limits     Critical values: no  Intervention for critical value(s):     Abnormal Imaging: yes,  CT scan            You may also review the ED PT Care Timeline found under the Summary Tab, ED Encounter Summary, Timeline Reports, ED Patient Care Timeline.     Recommendation    Pending orders/Uncompleted orders to hand off:      Additional Comments: Patients diltiazem has remained on 5mg.  If any further questions, please call Sending RN at [phone]

## 2025-04-16 NOTE — ED PROVIDER NOTES
[facility] EMERGENCY DEPARTMENT      CHIEF COMPLAINT  Irregular Heart Beat (Pt had a cardiac ablation last week. Now in a flutter per EMS with a rate of 110-140. Pt has been feeling more tired lately. Having groin and abd pain as well. Feels lightheaded, dizzy, n/v. 3/10 pain)       HISTORY OF PRESENT ILLNESS  Jamal Hutchison is a 74 y.o. male  who presents to the ED complaining of generalized fatigue, right groin abdominal pain that started few days ago.  Had cardiac ablation 1 week ago for A-fib and was started on amiodarone.  Endorses shortness of breath and does feel palpitations for the last couple of days.  Denies any fevers or chills.    No other complaints, modifying factors or associated symptoms.     I have reviewed the following from the nursing documentation.    Past Medical History:   Diagnosis Date    Atrial fibrillation with rapid ventricular response (HCC)     CAD (coronary artery disease)     CVA (cerebral vascular accident) (HCC)     Hearing loss     right    Hyperlipidemia     Hypertension     TIA (transient ischemic attack)      Past Surgical History:   Procedure Laterality Date    CAROTID ENDARTERECTOMY Left 2023    LEFT CAROTID ENDARTERECTOMY performed by Nathan Castaneda MD at [facility] OR    COLONOSCOPY      EP DEVICE PROCEDURE N/A 2025    Ablation A-fib w complete ep study performed by HERMINIA Murry Jr., MD at [facility] CARDIAC CATH LAB    HERNIA REPAIR      TIBIA FRACTURE SURGERY Left      History reviewed. No pertinent family history.  Social History     Socioeconomic History    Marital status:      Spouse name: Not on file    Number of children: Not on file    Years of education: Not on file    Highest education level: Not on file   Occupational History    Not on file   Tobacco Use    Smoking status: Former     Current packs/day: 0.00     Types: Cigarettes     Quit date:      Years since quittin.3    Smokeless tobacco: Never   Vaping Use    Vaping status: Never Used

## 2025-04-17 LAB
ANION GAP SERPL CALCULATED.3IONS-SCNC: 9 MMOL/L (ref 3–16)
BUN SERPL-MCNC: 18 MG/DL (ref 7–20)
CALCIUM SERPL-MCNC: 8 MG/DL (ref 8.3–10.6)
CHLORIDE SERPL-SCNC: 105 MMOL/L (ref 99–110)
CO2 SERPL-SCNC: 24 MMOL/L (ref 21–32)
CREAT SERPL-MCNC: 1.3 MG/DL (ref 0.8–1.3)
DEPRECATED RDW RBC AUTO: 20.5 % (ref 12.4–15.4)
EKG ATRIAL RATE: 312 BPM
EKG ATRIAL RATE: 312 BPM
EKG ATRIAL RATE: 91 BPM
EKG DIAGNOSIS: NORMAL
EKG P AXIS: 63 DEGREES
EKG P AXIS: 95 DEGREES
EKG P AXIS: 95 DEGREES
EKG P-R INTERVAL: 178 MS
EKG Q-T INTERVAL: 430 MS
EKG Q-T INTERVAL: 444 MS
EKG Q-T INTERVAL: 444 MS
EKG QRS DURATION: 80 MS
EKG QRS DURATION: 84 MS
EKG QRS DURATION: 84 MS
EKG QTC CALCULATION (BAZETT): 518 MS
EKG QTC CALCULATION (BAZETT): 518 MS
EKG QTC CALCULATION (BAZETT): 528 MS
EKG R AXIS: 13 DEGREES
EKG R AXIS: 13 DEGREES
EKG R AXIS: 21 DEGREES
EKG T AXIS: 3 DEGREES
EKG T AXIS: 3 DEGREES
EKG T AXIS: 53 DEGREES
EKG VENTRICULAR RATE: 82 BPM
EKG VENTRICULAR RATE: 82 BPM
EKG VENTRICULAR RATE: 91 BPM
FERRITIN SERPL IA-MCNC: 11.6 NG/ML (ref 30–400)
FOLATE SERPL-MCNC: 9.92 NG/ML (ref 4.78–24.2)
GFR SERPLBLD CREATININE-BSD FMLA CKD-EPI: 57 ML/MIN/{1.73_M2}
GLUCOSE SERPL-MCNC: 76 MG/DL (ref 70–99)
HCT VFR BLD AUTO: 24.5 % (ref 40.5–52.5)
HCT VFR BLD AUTO: 24.5 % (ref 40.5–52.5)
HGB BLD-MCNC: 7.5 G/DL (ref 13.5–17.5)
HGB BLD-MCNC: 7.6 G/DL (ref 13.5–17.5)
INR PPP: 3.3 (ref 0.85–1.15)
IRON SATN MFR SERPL: 5 % (ref 20–50)
IRON SERPL-MCNC: 14 UG/DL (ref 59–158)
MCH RBC QN AUTO: 20 PG (ref 26–34)
MCHC RBC AUTO-ENTMCNC: 30.8 G/DL (ref 31–36)
MCV RBC AUTO: 65.2 FL (ref 80–100)
OSMOLALITY SERPL: 289 MOSM/KG (ref 280–301)
PATH INTERP BLD-IMP: NO
PLATELET # BLD AUTO: 131 K/UL (ref 135–450)
PMV BLD AUTO: 8.7 FL (ref 5–10.5)
POTASSIUM SERPL-SCNC: 4.4 MMOL/L (ref 3.5–5.1)
PROTHROMBIN TIME: 33.3 SEC (ref 11.9–14.9)
RBC # BLD AUTO: 3.77 M/UL (ref 4.2–5.9)
SODIUM SERPL-SCNC: 138 MMOL/L (ref 136–145)
TIBC SERPL-MCNC: 293 UG/DL (ref 260–445)
TRANSFERRIN SERPL-MCNC: 239 MG/DL (ref 200–360)
VIT B12 SERPL-MCNC: 1281 PG/ML (ref 211–911)
WBC # BLD AUTO: 5.4 K/UL (ref 4–11)

## 2025-04-17 PROCEDURE — 82607 VITAMIN B-12: CPT

## 2025-04-17 PROCEDURE — 6370000000 HC RX 637 (ALT 250 FOR IP): Performed by: NURSE PRACTITIONER

## 2025-04-17 PROCEDURE — 6370000000 HC RX 637 (ALT 250 FOR IP): Performed by: INTERNAL MEDICINE

## 2025-04-17 PROCEDURE — 93005 ELECTROCARDIOGRAM TRACING: CPT | Performed by: INTERNAL MEDICINE

## 2025-04-17 PROCEDURE — 83930 ASSAY OF BLOOD OSMOLALITY: CPT

## 2025-04-17 PROCEDURE — 84466 ASSAY OF TRANSFERRIN: CPT

## 2025-04-17 PROCEDURE — 85610 PROTHROMBIN TIME: CPT

## 2025-04-17 PROCEDURE — 6370000000 HC RX 637 (ALT 250 FOR IP)

## 2025-04-17 PROCEDURE — 99233 SBSQ HOSP IP/OBS HIGH 50: CPT

## 2025-04-17 PROCEDURE — 82728 ASSAY OF FERRITIN: CPT

## 2025-04-17 PROCEDURE — 85014 HEMATOCRIT: CPT

## 2025-04-17 PROCEDURE — 93010 ELECTROCARDIOGRAM REPORT: CPT | Performed by: INTERNAL MEDICINE

## 2025-04-17 PROCEDURE — 2060000000 HC ICU INTERMEDIATE R&B

## 2025-04-17 PROCEDURE — 85027 COMPLETE CBC AUTOMATED: CPT

## 2025-04-17 PROCEDURE — 6360000002 HC RX W HCPCS: Performed by: NURSE PRACTITIONER

## 2025-04-17 PROCEDURE — 36415 COLL VENOUS BLD VENIPUNCTURE: CPT

## 2025-04-17 PROCEDURE — 83540 ASSAY OF IRON: CPT

## 2025-04-17 PROCEDURE — 85018 HEMOGLOBIN: CPT

## 2025-04-17 PROCEDURE — 82746 ASSAY OF FOLIC ACID SERUM: CPT

## 2025-04-17 PROCEDURE — 80048 BASIC METABOLIC PNL TOTAL CA: CPT

## 2025-04-17 PROCEDURE — 2500000003 HC RX 250 WO HCPCS: Performed by: NURSE PRACTITIONER

## 2025-04-17 PROCEDURE — 93005 ELECTROCARDIOGRAM TRACING: CPT | Performed by: STUDENT IN AN ORGANIZED HEALTH CARE EDUCATION/TRAINING PROGRAM

## 2025-04-17 RX ORDER — MEXILETINE HYDROCHLORIDE 200 MG/1
200 CAPSULE ORAL 2 TIMES DAILY
Status: DISCONTINUED | OUTPATIENT
Start: 2025-04-17 | End: 2025-04-21 | Stop reason: HOSPADM

## 2025-04-17 RX ADMIN — Medication 10 ML: at 21:06

## 2025-04-17 RX ADMIN — APIXABAN 5 MG: 5 TABLET, FILM COATED ORAL at 21:04

## 2025-04-17 RX ADMIN — POLYETHYLENE GLYCOL 3350 17 G: 17 POWDER, FOR SOLUTION ORAL at 21:04

## 2025-04-17 RX ADMIN — DOFETILIDE 250 MCG: 0.25 CAPSULE ORAL at 09:02

## 2025-04-17 RX ADMIN — ONDANSETRON 4 MG: 2 INJECTION, SOLUTION INTRAMUSCULAR; INTRAVENOUS at 22:50

## 2025-04-17 RX ADMIN — PANTOPRAZOLE SODIUM 40 MG: 40 TABLET, DELAYED RELEASE ORAL at 09:02

## 2025-04-17 RX ADMIN — Medication 100 MG: at 09:02

## 2025-04-17 RX ADMIN — SPIRONOLACTONE 50 MG: 25 TABLET ORAL at 09:02

## 2025-04-17 RX ADMIN — ATORVASTATIN CALCIUM 40 MG: 40 TABLET, FILM COATED ORAL at 21:04

## 2025-04-17 RX ADMIN — COLCHICINE 0.6 MG: 0.6 TABLET ORAL at 09:02

## 2025-04-17 RX ADMIN — APIXABAN 5 MG: 5 TABLET, FILM COATED ORAL at 09:02

## 2025-04-17 RX ADMIN — MEXILETINE HYDROCHLORIDE 200 MG: 200 CAPSULE ORAL at 21:04

## 2025-04-17 RX ADMIN — DOFETILIDE 250 MCG: 0.25 CAPSULE ORAL at 21:04

## 2025-04-17 RX ADMIN — SUCRALFATE 1 G: 1 TABLET ORAL at 21:04

## 2025-04-17 RX ADMIN — PANTOPRAZOLE SODIUM 40 MG: 40 TABLET, DELAYED RELEASE ORAL at 21:04

## 2025-04-17 RX ADMIN — ASPIRIN 81 MG: 81 TABLET, CHEWABLE ORAL at 09:02

## 2025-04-17 RX ADMIN — SUCRALFATE 1 G: 1 TABLET ORAL at 15:16

## 2025-04-17 ASSESSMENT — PAIN SCALES - GENERAL
PAINLEVEL_OUTOF10: 0
PAINLEVEL_OUTOF10: 0

## 2025-04-18 ENCOUNTER — ANESTHESIA EVENT (OUTPATIENT)
Dept: ENDOSCOPY | Age: 75
End: 2025-04-18
Payer: MEDICARE

## 2025-04-18 ENCOUNTER — ANESTHESIA (OUTPATIENT)
Dept: ENDOSCOPY | Age: 75
End: 2025-04-18
Payer: MEDICARE

## 2025-04-18 LAB
ABO/RH: NORMAL
ANION GAP SERPL CALCULATED.3IONS-SCNC: 6 MMOL/L (ref 3–16)
ANTIBODY SCREEN: NORMAL
BLOOD BANK DISPENSE STATUS: NORMAL
BLOOD BANK PRODUCT CODE: NORMAL
BPU ID: NORMAL
BUN SERPL-MCNC: 21 MG/DL (ref 7–20)
CALCIUM SERPL-MCNC: 8.3 MG/DL (ref 8.3–10.6)
CHLORIDE SERPL-SCNC: 103 MMOL/L (ref 99–110)
CO2 SERPL-SCNC: 22 MMOL/L (ref 21–32)
CREAT SERPL-MCNC: 1.2 MG/DL (ref 0.8–1.3)
DESCRIPTION BLOOD BANK: NORMAL
GFR SERPLBLD CREATININE-BSD FMLA CKD-EPI: 63 ML/MIN/{1.73_M2}
GLUCOSE SERPL-MCNC: 107 MG/DL (ref 70–99)
HCT VFR BLD AUTO: 22.9 % (ref 40.5–52.5)
HCT VFR BLD AUTO: 24.9 % (ref 40.5–52.5)
HCT VFR BLD AUTO: 25.1 % (ref 40.5–52.5)
HCT VFR BLD AUTO: 27.8 % (ref 40.5–52.5)
HEMOCCULT SP1 STL QL: ABNORMAL
HGB BLD-MCNC: 7.2 G/DL (ref 13.5–17.5)
HGB BLD-MCNC: 7.7 G/DL (ref 13.5–17.5)
HGB BLD-MCNC: 7.8 G/DL (ref 13.5–17.5)
HGB BLD-MCNC: 8.6 G/DL (ref 13.5–17.5)
INR PPP: 2.46 (ref 0.85–1.15)
PATH INTERP BLD-IMP: NO
POTASSIUM SERPL-SCNC: 4 MMOL/L (ref 3.5–5.1)
PROTHROMBIN TIME: 26.6 SEC (ref 11.9–14.9)
SODIUM SERPL-SCNC: 131 MMOL/L (ref 136–145)

## 2025-04-18 PROCEDURE — 0DB58ZX EXCISION OF ESOPHAGUS, VIA NATURAL OR ARTIFICIAL OPENING ENDOSCOPIC, DIAGNOSTIC: ICD-10-PCS | Performed by: INTERNAL MEDICINE

## 2025-04-18 PROCEDURE — 6370000000 HC RX 637 (ALT 250 FOR IP): Performed by: NURSE PRACTITIONER

## 2025-04-18 PROCEDURE — 82270 OCCULT BLOOD FECES: CPT

## 2025-04-18 PROCEDURE — 7100000011 HC PHASE II RECOVERY - ADDTL 15 MIN: Performed by: INTERNAL MEDICINE

## 2025-04-18 PROCEDURE — 86850 RBC ANTIBODY SCREEN: CPT

## 2025-04-18 PROCEDURE — 36415 COLL VENOUS BLD VENIPUNCTURE: CPT

## 2025-04-18 PROCEDURE — 86900 BLOOD TYPING SEROLOGIC ABO: CPT

## 2025-04-18 PROCEDURE — 2580000003 HC RX 258: Performed by: NURSE PRACTITIONER

## 2025-04-18 PROCEDURE — 86901 BLOOD TYPING SEROLOGIC RH(D): CPT

## 2025-04-18 PROCEDURE — 6360000002 HC RX W HCPCS: Performed by: NURSE PRACTITIONER

## 2025-04-18 PROCEDURE — 2060000000 HC ICU INTERMEDIATE R&B

## 2025-04-18 PROCEDURE — 85018 HEMOGLOBIN: CPT

## 2025-04-18 PROCEDURE — 85610 PROTHROMBIN TIME: CPT

## 2025-04-18 PROCEDURE — 2709999900 HC NON-CHARGEABLE SUPPLY: Performed by: INTERNAL MEDICINE

## 2025-04-18 PROCEDURE — 6360000002 HC RX W HCPCS: Performed by: NURSE ANESTHETIST, CERTIFIED REGISTERED

## 2025-04-18 PROCEDURE — P9016 RBC LEUKOCYTES REDUCED: HCPCS

## 2025-04-18 PROCEDURE — 86923 COMPATIBILITY TEST ELECTRIC: CPT

## 2025-04-18 PROCEDURE — 88305 TISSUE EXAM BY PATHOLOGIST: CPT

## 2025-04-18 PROCEDURE — 3609012400 HC EGD TRANSORAL BIOPSY SINGLE/MULTIPLE: Performed by: INTERNAL MEDICINE

## 2025-04-18 PROCEDURE — 85014 HEMATOCRIT: CPT

## 2025-04-18 PROCEDURE — 93005 ELECTROCARDIOGRAM TRACING: CPT | Performed by: INTERNAL MEDICINE

## 2025-04-18 PROCEDURE — 3700000000 HC ANESTHESIA ATTENDED CARE: Performed by: INTERNAL MEDICINE

## 2025-04-18 PROCEDURE — 36430 TRANSFUSION BLD/BLD COMPNT: CPT

## 2025-04-18 PROCEDURE — 2500000003 HC RX 250 WO HCPCS: Performed by: NURSE PRACTITIONER

## 2025-04-18 PROCEDURE — 7100000010 HC PHASE II RECOVERY - FIRST 15 MIN: Performed by: INTERNAL MEDICINE

## 2025-04-18 PROCEDURE — 80048 BASIC METABOLIC PNL TOTAL CA: CPT

## 2025-04-18 PROCEDURE — 30233N1 TRANSFUSION OF NONAUTOLOGOUS RED BLOOD CELLS INTO PERIPHERAL VEIN, PERCUTANEOUS APPROACH: ICD-10-PCS | Performed by: INTERNAL MEDICINE

## 2025-04-18 PROCEDURE — 99233 SBSQ HOSP IP/OBS HIGH 50: CPT

## 2025-04-18 PROCEDURE — 6370000000 HC RX 637 (ALT 250 FOR IP)

## 2025-04-18 PROCEDURE — 2580000003 HC RX 258: Performed by: ANESTHESIOLOGY

## 2025-04-18 PROCEDURE — 6370000000 HC RX 637 (ALT 250 FOR IP): Performed by: INTERNAL MEDICINE

## 2025-04-18 RX ORDER — LABETALOL HYDROCHLORIDE 5 MG/ML
5 INJECTION, SOLUTION INTRAVENOUS EVERY 10 MIN PRN
Status: DISCONTINUED | OUTPATIENT
Start: 2025-04-18 | End: 2025-04-18 | Stop reason: HOSPADM

## 2025-04-18 RX ORDER — SODIUM CHLORIDE 0.9 % (FLUSH) 0.9 %
5-40 SYRINGE (ML) INJECTION PRN
Status: DISCONTINUED | OUTPATIENT
Start: 2025-04-18 | End: 2025-04-18 | Stop reason: HOSPADM

## 2025-04-18 RX ORDER — SODIUM CHLORIDE 0.9 % (FLUSH) 0.9 %
5-40 SYRINGE (ML) INJECTION EVERY 12 HOURS SCHEDULED
Status: DISCONTINUED | OUTPATIENT
Start: 2025-04-18 | End: 2025-04-18 | Stop reason: HOSPADM

## 2025-04-18 RX ORDER — SODIUM CHLORIDE, SODIUM LACTATE, POTASSIUM CHLORIDE, CALCIUM CHLORIDE 600; 310; 30; 20 MG/100ML; MG/100ML; MG/100ML; MG/100ML
INJECTION, SOLUTION INTRAVENOUS CONTINUOUS
Status: DISCONTINUED | OUTPATIENT
Start: 2025-04-18 | End: 2025-04-18 | Stop reason: HOSPADM

## 2025-04-18 RX ORDER — OXYCODONE HYDROCHLORIDE 5 MG/1
10 TABLET ORAL PRN
Status: DISCONTINUED | OUTPATIENT
Start: 2025-04-18 | End: 2025-04-18 | Stop reason: HOSPADM

## 2025-04-18 RX ORDER — LIDOCAINE HYDROCHLORIDE 10 MG/ML
0.3 INJECTION, SOLUTION EPIDURAL; INFILTRATION; INTRACAUDAL; PERINEURAL
Status: DISCONTINUED | OUTPATIENT
Start: 2025-04-18 | End: 2025-04-18 | Stop reason: HOSPADM

## 2025-04-18 RX ORDER — NALOXONE HYDROCHLORIDE 0.4 MG/ML
INJECTION, SOLUTION INTRAMUSCULAR; INTRAVENOUS; SUBCUTANEOUS PRN
Status: DISCONTINUED | OUTPATIENT
Start: 2025-04-18 | End: 2025-04-18 | Stop reason: HOSPADM

## 2025-04-18 RX ORDER — OCTREOTIDE ACETATE 100 UG/ML
50 INJECTION, SOLUTION INTRAVENOUS; SUBCUTANEOUS ONCE
Status: COMPLETED | OUTPATIENT
Start: 2025-04-18 | End: 2025-04-18

## 2025-04-18 RX ORDER — DOFETILIDE 0.12 MG/1
125 CAPSULE ORAL EVERY 12 HOURS SCHEDULED
Status: DISCONTINUED | OUTPATIENT
Start: 2025-04-18 | End: 2025-04-21 | Stop reason: HOSPADM

## 2025-04-18 RX ORDER — SODIUM CHLORIDE 9 MG/ML
INJECTION, SOLUTION INTRAVENOUS PRN
Status: DISCONTINUED | OUTPATIENT
Start: 2025-04-18 | End: 2025-04-18 | Stop reason: HOSPADM

## 2025-04-18 RX ORDER — DIPHENHYDRAMINE HYDROCHLORIDE 50 MG/ML
12.5 INJECTION, SOLUTION INTRAMUSCULAR; INTRAVENOUS
Status: DISCONTINUED | OUTPATIENT
Start: 2025-04-18 | End: 2025-04-18 | Stop reason: HOSPADM

## 2025-04-18 RX ORDER — LIDOCAINE HYDROCHLORIDE 20 MG/ML
INJECTION, SOLUTION EPIDURAL; INFILTRATION; INTRACAUDAL; PERINEURAL
Status: DISCONTINUED | OUTPATIENT
Start: 2025-04-18 | End: 2025-04-18 | Stop reason: SDUPTHER

## 2025-04-18 RX ORDER — MEPERIDINE HYDROCHLORIDE 50 MG/ML
12.5 INJECTION INTRAMUSCULAR; INTRAVENOUS; SUBCUTANEOUS EVERY 5 MIN PRN
Status: DISCONTINUED | OUTPATIENT
Start: 2025-04-18 | End: 2025-04-18 | Stop reason: HOSPADM

## 2025-04-18 RX ORDER — ONDANSETRON 2 MG/ML
4 INJECTION INTRAMUSCULAR; INTRAVENOUS
Status: DISCONTINUED | OUTPATIENT
Start: 2025-04-18 | End: 2025-04-18 | Stop reason: HOSPADM

## 2025-04-18 RX ORDER — OXYCODONE HYDROCHLORIDE 5 MG/1
5 TABLET ORAL PRN
Status: DISCONTINUED | OUTPATIENT
Start: 2025-04-18 | End: 2025-04-18 | Stop reason: HOSPADM

## 2025-04-18 RX ORDER — SODIUM CHLORIDE 9 MG/ML
INJECTION, SOLUTION INTRAVENOUS PRN
Status: DISCONTINUED | OUTPATIENT
Start: 2025-04-18 | End: 2025-04-21 | Stop reason: HOSPADM

## 2025-04-18 RX ORDER — PROPOFOL 10 MG/ML
INJECTION, EMULSION INTRAVENOUS
Status: DISCONTINUED | OUTPATIENT
Start: 2025-04-18 | End: 2025-04-18 | Stop reason: SDUPTHER

## 2025-04-18 RX ORDER — PANTOPRAZOLE SODIUM 40 MG/10ML
40 INJECTION, POWDER, LYOPHILIZED, FOR SOLUTION INTRAVENOUS 2 TIMES DAILY
Status: DISCONTINUED | OUTPATIENT
Start: 2025-04-18 | End: 2025-04-21 | Stop reason: HOSPADM

## 2025-04-18 RX ADMIN — MEXILETINE HYDROCHLORIDE 200 MG: 200 CAPSULE ORAL at 21:10

## 2025-04-18 RX ADMIN — DOFETILIDE 125 MCG: 0.12 CAPSULE ORAL at 21:10

## 2025-04-18 RX ADMIN — OCTREOTIDE ACETATE 50 MCG: 100 INJECTION, SOLUTION INTRAVENOUS; SUBCUTANEOUS at 14:10

## 2025-04-18 RX ADMIN — PANTOPRAZOLE SODIUM 40 MG: 40 INJECTION, POWDER, FOR SOLUTION INTRAVENOUS at 08:54

## 2025-04-18 RX ADMIN — Medication 100 MG: at 08:55

## 2025-04-18 RX ADMIN — MEXILETINE HYDROCHLORIDE 200 MG: 200 CAPSULE ORAL at 08:54

## 2025-04-18 RX ADMIN — PROPOFOL 100 MG: 10 INJECTION, EMULSION INTRAVENOUS at 12:49

## 2025-04-18 RX ADMIN — Medication 10 ML: at 08:55

## 2025-04-18 RX ADMIN — SUCRALFATE 1 G: 1 TABLET ORAL at 16:46

## 2025-04-18 RX ADMIN — SODIUM CHLORIDE: 9 INJECTION, SOLUTION INTRAVENOUS at 11:20

## 2025-04-18 RX ADMIN — LIDOCAINE HYDROCHLORIDE 100 MG: 20 INJECTION, SOLUTION EPIDURAL; INFILTRATION; INTRACAUDAL at 12:49

## 2025-04-18 RX ADMIN — SUCRALFATE 1 G: 1 TABLET ORAL at 04:37

## 2025-04-18 RX ADMIN — ACETAMINOPHEN 650 MG: 325 TABLET ORAL at 08:54

## 2025-04-18 RX ADMIN — DOFETILIDE 250 MCG: 0.25 CAPSULE ORAL at 08:54

## 2025-04-18 RX ADMIN — PANTOPRAZOLE SODIUM 40 MG: 40 INJECTION, POWDER, FOR SOLUTION INTRAVENOUS at 21:10

## 2025-04-18 RX ADMIN — SUCRALFATE 1 G: 1 TABLET ORAL at 21:10

## 2025-04-18 RX ADMIN — ATORVASTATIN CALCIUM 40 MG: 40 TABLET, FILM COATED ORAL at 21:10

## 2025-04-18 RX ADMIN — SPIRONOLACTONE 50 MG: 25 TABLET ORAL at 08:55

## 2025-04-18 RX ADMIN — OCTREOTIDE ACETATE 25 MCG/HR: 500 INJECTION, SOLUTION INTRAVENOUS; SUBCUTANEOUS at 14:10

## 2025-04-18 RX ADMIN — Medication 10 ML: at 21:10

## 2025-04-18 ASSESSMENT — PAIN - FUNCTIONAL ASSESSMENT: PAIN_FUNCTIONAL_ASSESSMENT: 0-10

## 2025-04-18 ASSESSMENT — PAIN SCALES - GENERAL
PAINLEVEL_OUTOF10: 0
PAINLEVEL_OUTOF10: 9
PAINLEVEL_OUTOF10: 0

## 2025-04-18 ASSESSMENT — PAIN DESCRIPTION - DESCRIPTORS: DESCRIPTORS: ACHING

## 2025-04-18 ASSESSMENT — PAIN DESCRIPTION - LOCATION: LOCATION: HEAD

## 2025-04-18 NOTE — CONSULTS
CONSULTATION  Jamal Hutchison is a 74 y.o. male asked to see us in consultation by  Main Velazco III, MD & Lars Mariscal DO for evaluation of GIB.        Mr. Hutchison is a 74-year-old male with past medical history of decompensated alcohol cirrhosis with ascites diagnosed 2024, A-fib on Eliquis, s/p ablation 4/9, CAD, HTN and HLD who presented to the ER with fatigue and lightheadedness.  He was admitted with A-fib with RVR and required Cardizem drip which has since been turned off.  He has been evaluated EP.    Last night nursing noted a black tarry stool for which GI has been consulted to evaluate.  The patient tells me that he began haveing black stools at home over the past several days.  He denies hematemesis.  He reports some mild abdominal discomfort.  No NSAID use.  His last dose of Eliquis was last night.  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  No varices were seen.  He is on pantoprazole daily.  His last colonoscopy 11/2023 demonstrated 4 polyps with a 3-year surveillance recommended.    On admission his Hgb was 8.5 compared to 7.5 a week prior.  It has dropped to 7.2 as of this morning.  His BUN is elevated.  He is hemodynamically stable.  CTA did not show evidence of active GI bleed.  Gastric varices and moderate ascites is noted.  INR 2.46.    Plan:  EGD today.  Continue IV PPI bid.  Presentation at this time is less concerning for variceal bleed, but given the presence of gastric varices on CTA, will give Octreotide bolus and start drip.  Prophylactic rocephin.  Keep NPO.  Eliquis has been on hold today.        Thank you for permitting us to participate in the care of your patient. Please do not hesitate to call with questions or concerns.     1.  The patient indicates understanding of these issues and agrees with the plan.  2.  I reviewed the patient's

## 2025-04-18 NOTE — H&P
Hospital Medicine History & Physical    V 1.6    Date of Admission: 4/16/2025    Date of Service:  Pt seen/examined on 4/16/25     [x]Admitted to Inpatient with expected LOS greater than two midnights due to medical therapy.  []Placed in Observation status.    Chief Admission Complaint:  Irregular Heart Beat (Pt had a cardiac ablation last week. Now in a flutter per EMS with a rate of 110-140. Pt has been feeling more tired lately. Having groin and abd pain as well. Feels lightheaded, dizzy, n/v. 3/10 pain)       Presenting Admission History:      74 y.o. male with a PMH of paroxysmal atrial fibrillation, stroke, critical carotid stenosis, alcohol abuse, hyperlipidemia, and hypertension who presented to [facility] with a complaint of fatigue over the last couple days. He did report mild abd discomfort, right groin pain, n/v and feeling lightheaded.     Patient is s/p RFCA of AF 4/9/2025, discharged on amiodarone, colchicine, and carafate.     In ED, ECG with afib 's, covid/flu negative, H&H 8.5/28, BNP 1861, Trop 55->36, , CTA abdomen pelvis does not show any acute process. Cardizem gtt initiated.    Assessment/Plan:      Current Principal Problem:  Atrial fibrillation with RVR (HCC)    Afib rvr  -s/p RFCA of AF 4/9/2025   -Continue Eliquis, Amiodarone  -PPI BID  -Colchicine 0.6 mg daily x 10 days post ablation   -Carafate 1 gram PO QID x 2 weeks post ablation   -Cardizem gtt  -alk phos 151, total bili 1.6  -Cardiology consulted     Hypertension  -Stable  -On Cardizem 120mg daily     Hyperlipidemia  -Cont statin     History of CVA  -ASA/Statin     Chronic alcohol use  -Reports quit  -Continue thiamine       Discussed management and the need for Hospitalization of the patient w/ the Emergency Department Provider    CXR:  CXR with the following interpretation per radiology: Subtle area of increased density overlying the posterior left sixth rib.  Finding favored to be related to 
0854    0.9 % sodium chloride infusion, , IntraVENous, PRN, MastersonDallas wayne, APRN - CNP    octreotide (SANDOSTATIN) injection 50 mcg, 50 mcg, IntraVENous, Once, Jami Pope APRN - CNP    octreotide (SANDOSTATIN) 500 mcg in sodium chloride 0.9 % 100 mL infusion, 25 mcg/hr, IntraVENous, Continuous, Jami Pope APRN - CNP    lidocaine PF 1 % injection 0.3 mL, 0.3 mL, IntraDERmal, Once PRN, Yoshi Myers MD    lactated ringers infusion, , IntraVENous, Continuous, Yoshi Myers MD    sodium chloride flush 0.9 % injection 5-40 mL, 5-40 mL, IntraVENous, 2 times per day, Yoshi Myers MD    sodium chloride flush 0.9 % injection 5-40 mL, 5-40 mL, IntraVENous, PRN, Yoshi Myers MD    0.9 % sodium chloride infusion, , IntraVENous, PRN, Yoshi Myers MD, Last Rate: 50 mL/hr at 04/18/25 1230, NoRateChange at 04/18/25 1230    mexiletine (MEXITIL) capsule 200 mg, 200 mg, Oral, BID, Tiffanie Contreras APRN - CNP, 200 mg at 04/18/25 0854    sodium chloride flush 0.9 % injection 5-40 mL, 5-40 mL, IntraVENous, 2 times per day, MastersonDallas wayne, APRN - CNP, 10 mL at 04/18/25 0855    sodium chloride flush 0.9 % injection 5-40 mL, 5-40 mL, IntraVENous, PRN, MastersonDallas wayne, APRN - CNP    0.9 % sodium chloride infusion, , IntraVENous, PRN, Masterson, Dallas, APRN - CNP    ondansetron (ZOFRAN-ODT) disintegrating tablet 4 mg, 4 mg, Oral, Q8H PRN **OR** ondansetron (ZOFRAN) injection 4 mg, 4 mg, IntraVENous, Q6H PRN, MastersonDallas wayne, APRN - CNP, 4 mg at 04/17/25 2250    polyethylene glycol (GLYCOLAX) packet 17 g, 17 g, Oral, Daily PRN, MastersonDallas wayne, APRN - CNP, 17 g at 04/17/25 2104    acetaminophen (TYLENOL) tablet 650 mg, 650 mg, Oral, Q6H PRN, 650 mg at 04/18/25 0854 **OR** acetaminophen (TYLENOL) suppository 650 mg, 650 mg, Rectal, Q6H PRN, [place] Masterson APRN - CNP    [Held by provider] aspirin chewable tablet 81 mg, 81 mg, Oral, Daily, Dallas Masterson APRN - CNP, 81 mg at 04/17/25 0902    atorvastatin (LIPITOR) tablet 40 mg, 40 mg, Oral, Nightly,

## 2025-04-18 NOTE — CONSENT
Informed Consent for Blood Component Transfusion Note    I have discussed with the patient the rationale for blood component transfusion; its benefits in treating or preventing fatigue, organ damage, or death; and its risk which includes mild transfusion reactions, rare risk of blood borne infection, or more serious but rare reactions. I have discussed the alternatives to transfusion, including the risk and consequences of not receiving transfusion. The patient had an opportunity to ask questions and had agreed to proceed with transfusion of blood components.    Electronically signed by SMILEY Hummel CNP on 4/18/25 at 9:43 AM EDT

## 2025-04-18 NOTE — ANESTHESIA POSTPROCEDURE EVALUATION
Department of Anesthesiology  Postprocedure Note    Patient: Jamal Hutchison  MRN: [id number]  YOB: 1950  Date of evaluation: 4/18/2025    Procedure Summary       Date: 04/18/25 Room / Location: [place] [address] / [facility]    Anesthesia Start: 1245 Anesthesia Stop: 1301    Procedure: ESOPHAGOGASTRODUODENOSCOPY BIOPSY Diagnosis:       Melena      (Melena [K92.1])    Surgeons: Jason Hdz DO Responsible Provider: Yoshi Myers MD    Anesthesia Type: general ASA Status: 3            Anesthesia Type: No value filed.    Neisha Phase I: Neisha Score: 10    Neisha Phase II: Neisha Score: 10    Anesthesia Post Evaluation    Comments: Postoperative Anesthesia Note    Name:    Jamal Hutchison  MRN:      [id number]    Patient Vitals in the past 12 hrs:  04/18/25 1407, BP:(!) 123/59, Temp:97.9 °F (36.6 °C), Temp src:Oral, Pulse:82, Resp:16, SpO2:100 %  04/18/25 1337, Pulse:80  04/18/25 1335, BP:135/69, Pulse:81, Resp:18, SpO2:100 %  04/18/25 1315, BP:138/67, Pulse:80, Resp:18, SpO2:99 %  04/18/25 1310, BP:138/65, Pulse:81, Resp:17, SpO2:97 %  04/18/25 1305, BP:(!) 140/65, Pulse:80, Resp:18, SpO2:99 %  04/18/25 1300, BP:136/64, Pulse:80, Resp:18, SpO2:99 %  04/18/25 1110, BP:135/73, Temp:98.2 °F (36.8 °C), Temp src:Oral, Pulse:84, Resp:20, SpO2:99 %  04/18/25 1047, BP:(!) 141/72, Temp:97.9 °F (36.6 °C), Temp src:Oral, Pulse:86, Resp:18, SpO2:99 %  04/18/25 0836, BP:126/72, Temp:98.2 °F (36.8 °C), Temp src:Oral, Pulse:89, Resp:18, SpO2:99 %  04/18/25 0430, BP:(!) 145/74, Temp:97.5 °F (36.4 °C), Temp src:Oral, Pulse:90, Resp:20, SpO2:100 %     LABS:    CBC  Lab Results       Component                Value               Date/Time                  WBC                      5.4                 04/17/2025 04:24 AM        HGB                      7.2 (L)             04/18/2025 04:29 AM        HCT                      22.9 (L)            04/18/2025 04:29 AM        PLT                      131 (L)

## 2025-04-18 NOTE — OP NOTE
Esophagogastroduodenoscopy Note    Patient:   Jamal Hutchison    YOB: 1950    Facility:     [facility]  [address] [address]   [Inpatient]   Referring/PCP: Main Velazco III, MD    Procedure:   Esophagogastroduodenoscopy with biopsy  Date:     4/18/2025   Endoscopist:  Jason Hdz DO     Preoperative Diagnosis:   Melena    Postoperative Diagnosis:  same    Anesthesia:  MAC    Estimated blood loss: Minimal    Complications: None    Description of Procedure:  Informed consent was obtained from the patient after explanation of the procedure including indications, description of the procedure,  benefits and possible risks and complications of the procedure, and alternatives. Questions were answered.  The patient's history was reviewed and a directed physical examination was performed prior to the procedure.    Patient was monitored throughout the procedure with pulse oximetry and periodic assessment of vital signs. Patient was sedated as noted above. The Nursing staff and I performed a time out.  With the patient in the left lateral decubitus position, the Olympus videoendoscope was placed in the patient's mouth and under direct visualization passed into the esophagus. The scope was ultimately passed to the second portion of the duodenum.  Visualization was performed during both introduction and withdrawal of the endoscope and retroflexed view of the proximal stomach was obtained.     Findings::   Esophagus: Focal ectopic mucosa was noted at the distal esophagus.  Biopsies were obtained.  The findings do support a diagnosis of Cain's Esophagus.  No esophageal varices or stigmata of bleeding were seen.  Stomach: Portal hypertensive gastropathy was seen in the proximal stomach.  No gastric varices were seen.  No ulcerations or active bleeding was seen.  Duodenum: Normal    Recommendations:   Await pathology results  Repeat

## 2025-04-18 NOTE — ANESTHESIA PRE PROCEDURE
Department of Anesthesiology  Preprocedure Note       Name:  Jamal Hutchison   Age:  74 y.o.  :  1950                                          MRN:  [id number]         Date:  2025      Surgeon: Surgeon(s):  Jason Hdz DO    Procedure: Procedure(s):  ESOPHAGOGASTRODUODENOSCOPY    Medications prior to admission:   Prior to Admission medications    Medication Sig Start Date End Date Taking? Authorizing Provider   amiodarone (CORDARONE) 200 MG tablet Take 400 mg PO daily for two weeks then decrease to 200 mg PO daily 25  Yes Janelle Lopez APRN - CNP   dilTIAZem (CARDIZEM CD) 120 MG extended release capsule Take 1 capsule by mouth daily 25  Yes Janelle Lopez APRN - CNP   colchicine (COLCRYS) 0.6 MG tablet Take 1 tablet by mouth daily for 10 days 25 Yes Janelle Lopez APRN - CNP   sucralfate (CARAFATE) 1 GM tablet Take 1 tablet by mouth every 8 hours for 14 days 4/10/25 4/24/25 Yes Janelle Lopez APRN - CNP   spironolactone (ALDACTONE) 50 MG tablet Take 1 tablet by mouth daily   Yes ProviderYoko MD   furosemide (LASIX) 20 MG tablet Take 1 tablet by mouth daily   Yes Provider, MD Yoko   aspirin 81 MG chewable tablet Take 1 tablet by mouth daily   Yes Provider, MD Yoko   atorvastatin (LIPITOR) 40 MG tablet Take 1 tablet by mouth nightly 24  Yes Lion Tinoco DO   thiamine 100 MG tablet Take 1 tablet by mouth daily 24  Yes Lion Tinoco DO   apixaban (ELIQUIS) 5 MG TABS tablet Take 1 tablet by mouth 2 times daily 24  Yes HERMINIA Murry Jr., MD   pantoprazole (PROTONIX) 40 MG tablet Take 1 tablet by mouth 2 times daily 24  Yes Tiffanie Contreras APRN - CNP   magnesium oxide (MAG-OX) 400 MG tablet Take 1 tablet by mouth daily 23  Yes Linda Michele APRN - CNP   clopidogrel (PLAVIX) 75 MG tablet TAKE 1 TABLET BY MOUTH EVERY DAY  Patient not taking: Reported on 24   Nathan Castaneda MD   Multiple

## 2025-04-19 PROBLEM — I48.92 ATRIAL FLUTTER WITH RAPID VENTRICULAR RESPONSE (HCC): Status: ACTIVE | Noted: 2023-05-25

## 2025-04-19 PROBLEM — K92.2 GASTROINTESTINAL HEMORRHAGE: Status: ACTIVE | Noted: 2025-04-19

## 2025-04-19 PROBLEM — I48.0 PAF (PAROXYSMAL ATRIAL FIBRILLATION) (HCC): Status: ACTIVE | Noted: 2025-03-05

## 2025-04-19 PROBLEM — D62 ABLA (ACUTE BLOOD LOSS ANEMIA): Status: ACTIVE | Noted: 2025-04-19

## 2025-04-19 PROBLEM — Z86.73 HISTORY OF CVA (CEREBROVASCULAR ACCIDENT): Status: ACTIVE | Noted: 2025-04-19

## 2025-04-19 LAB
ANION GAP SERPL CALCULATED.3IONS-SCNC: 8 MMOL/L (ref 3–16)
BUN SERPL-MCNC: 18 MG/DL (ref 7–20)
CALCIUM SERPL-MCNC: 8 MG/DL (ref 8.3–10.6)
CHLORIDE SERPL-SCNC: 104 MMOL/L (ref 99–110)
CO2 SERPL-SCNC: 21 MMOL/L (ref 21–32)
CREAT SERPL-MCNC: 1.2 MG/DL (ref 0.8–1.3)
EKG ATRIAL RATE: 78 BPM
EKG ATRIAL RATE: 84 BPM
EKG ATRIAL RATE: 91 BPM
EKG ATRIAL RATE: 92 BPM
EKG DIAGNOSIS: NORMAL
EKG P AXIS: 56 DEGREES
EKG P AXIS: 62 DEGREES
EKG P AXIS: 77 DEGREES
EKG P AXIS: 77 DEGREES
EKG P-R INTERVAL: 180 MS
EKG P-R INTERVAL: 190 MS
EKG P-R INTERVAL: 196 MS
EKG P-R INTERVAL: 202 MS
EKG Q-T INTERVAL: 390 MS
EKG Q-T INTERVAL: 392 MS
EKG Q-T INTERVAL: 422 MS
EKG Q-T INTERVAL: 444 MS
EKG QRS DURATION: 86 MS
EKG QRS DURATION: 86 MS
EKG QRS DURATION: 88 MS
EKG QRS DURATION: 90 MS
EKG QTC CALCULATION (BAZETT): 480 MS
EKG QTC CALCULATION (BAZETT): 481 MS
EKG QTC CALCULATION (BAZETT): 485 MS
EKG QTC CALCULATION (BAZETT): 525 MS
EKG R AXIS: 16 DEGREES
EKG R AXIS: 28 DEGREES
EKG R AXIS: 32 DEGREES
EKG R AXIS: 7 DEGREES
EKG T AXIS: 26 DEGREES
EKG T AXIS: 49 DEGREES
EKG T AXIS: 52 DEGREES
EKG T AXIS: 59 DEGREES
EKG VENTRICULAR RATE: 78 BPM
EKG VENTRICULAR RATE: 84 BPM
EKG VENTRICULAR RATE: 91 BPM
EKG VENTRICULAR RATE: 92 BPM
GFR SERPLBLD CREATININE-BSD FMLA CKD-EPI: 63 ML/MIN/{1.73_M2}
GLUCOSE SERPL-MCNC: 71 MG/DL (ref 70–99)
HCT VFR BLD AUTO: 27.8 % (ref 40.5–52.5)
HGB BLD-MCNC: 8.7 G/DL (ref 13.5–17.5)
INR PPP: 2.39 (ref 0.85–1.15)
POTASSIUM SERPL-SCNC: 4.2 MMOL/L (ref 3.5–5.1)
PROTHROMBIN TIME: 26 SEC (ref 11.9–14.9)
SODIUM SERPL-SCNC: 133 MMOL/L (ref 136–145)

## 2025-04-19 PROCEDURE — 36415 COLL VENOUS BLD VENIPUNCTURE: CPT

## 2025-04-19 PROCEDURE — 80048 BASIC METABOLIC PNL TOTAL CA: CPT

## 2025-04-19 PROCEDURE — 85018 HEMOGLOBIN: CPT

## 2025-04-19 PROCEDURE — 2060000000 HC ICU INTERMEDIATE R&B

## 2025-04-19 PROCEDURE — 6370000000 HC RX 637 (ALT 250 FOR IP): Performed by: NURSE PRACTITIONER

## 2025-04-19 PROCEDURE — 6370000000 HC RX 637 (ALT 250 FOR IP)

## 2025-04-19 PROCEDURE — 85014 HEMATOCRIT: CPT

## 2025-04-19 PROCEDURE — 85610 PROTHROMBIN TIME: CPT

## 2025-04-19 PROCEDURE — 2500000003 HC RX 250 WO HCPCS: Performed by: NURSE PRACTITIONER

## 2025-04-19 PROCEDURE — 6360000002 HC RX W HCPCS: Performed by: NURSE PRACTITIONER

## 2025-04-19 PROCEDURE — 2580000003 HC RX 258: Performed by: NURSE PRACTITIONER

## 2025-04-19 PROCEDURE — 93005 ELECTROCARDIOGRAM TRACING: CPT | Performed by: INTERNAL MEDICINE

## 2025-04-19 RX ADMIN — SUCRALFATE 1 G: 1 TABLET ORAL at 23:42

## 2025-04-19 RX ADMIN — PANTOPRAZOLE SODIUM 40 MG: 40 INJECTION, POWDER, FOR SOLUTION INTRAVENOUS at 08:05

## 2025-04-19 RX ADMIN — SUCRALFATE 1 G: 1 TABLET ORAL at 14:50

## 2025-04-19 RX ADMIN — OCTREOTIDE ACETATE 25 MCG/HR: 500 INJECTION, SOLUTION INTRAVENOUS; SUBCUTANEOUS at 06:17

## 2025-04-19 RX ADMIN — SUCRALFATE 1 G: 1 TABLET ORAL at 05:48

## 2025-04-19 RX ADMIN — SPIRONOLACTONE 50 MG: 25 TABLET ORAL at 08:04

## 2025-04-19 RX ADMIN — PANTOPRAZOLE SODIUM 40 MG: 40 INJECTION, POWDER, FOR SOLUTION INTRAVENOUS at 21:13

## 2025-04-19 RX ADMIN — DOFETILIDE 125 MCG: 0.12 CAPSULE ORAL at 21:12

## 2025-04-19 RX ADMIN — ACETAMINOPHEN 650 MG: 325 TABLET ORAL at 08:04

## 2025-04-19 RX ADMIN — Medication 10 ML: at 08:05

## 2025-04-19 RX ADMIN — MEXILETINE HYDROCHLORIDE 200 MG: 200 CAPSULE ORAL at 21:12

## 2025-04-19 RX ADMIN — DOFETILIDE 125 MCG: 0.12 CAPSULE ORAL at 08:05

## 2025-04-19 RX ADMIN — Medication 10 ML: at 21:13

## 2025-04-19 RX ADMIN — Medication 100 MG: at 08:04

## 2025-04-19 RX ADMIN — ATORVASTATIN CALCIUM 40 MG: 40 TABLET, FILM COATED ORAL at 21:13

## 2025-04-19 RX ADMIN — MEXILETINE HYDROCHLORIDE 200 MG: 200 CAPSULE ORAL at 08:05

## 2025-04-19 ASSESSMENT — PAIN DESCRIPTION - LOCATION: LOCATION: HEAD

## 2025-04-19 ASSESSMENT — PAIN SCALES - GENERAL: PAINLEVEL_OUTOF10: 0

## 2025-04-19 ASSESSMENT — PAIN DESCRIPTION - DESCRIPTORS: DESCRIPTORS: ACHING

## 2025-04-19 NOTE — PLAN OF CARE
Problem: ABCDS Injury Assessment  Goal: Absence of physical injury  Outcome: Progressing     Problem: Safety - Adult  Goal: Free from fall injury  4/19/2025 0946 by Belkis Vargas RN  Outcome: Progressing     Problem: Chronic Conditions and Co-morbidities  Goal: Patient's chronic conditions and co-morbidity symptoms are monitored and maintained or improved  Outcome: Progressing

## 2025-04-20 LAB
ANION GAP SERPL CALCULATED.3IONS-SCNC: 8 MMOL/L (ref 3–16)
BUN SERPL-MCNC: 18 MG/DL (ref 7–20)
CALCIUM SERPL-MCNC: 7.9 MG/DL (ref 8.3–10.6)
CHLORIDE SERPL-SCNC: 104 MMOL/L (ref 99–110)
CO2 SERPL-SCNC: 21 MMOL/L (ref 21–32)
CREAT SERPL-MCNC: 1.2 MG/DL (ref 0.8–1.3)
DEPRECATED RDW RBC AUTO: 22.2 % (ref 12.4–15.4)
EKG ATRIAL RATE: 78 BPM
EKG ATRIAL RATE: 81 BPM
EKG DIAGNOSIS: NORMAL
EKG DIAGNOSIS: NORMAL
EKG P AXIS: 45 DEGREES
EKG P AXIS: 81 DEGREES
EKG P-R INTERVAL: 182 MS
EKG P-R INTERVAL: 202 MS
EKG Q-T INTERVAL: 406 MS
EKG Q-T INTERVAL: 413 MS
EKG QRS DURATION: 88 MS
EKG QRS DURATION: 96 MS
EKG QTC CALCULATION (BAZETT): 462 MS
EKG QTC CALCULATION (BAZETT): 480 MS
EKG R AXIS: 10 DEGREES
EKG R AXIS: 14 DEGREES
EKG T AXIS: 33 DEGREES
EKG T AXIS: 46 DEGREES
EKG VENTRICULAR RATE: 78 BPM
EKG VENTRICULAR RATE: 81 BPM
GFR SERPLBLD CREATININE-BSD FMLA CKD-EPI: 63 ML/MIN/{1.73_M2}
GLUCOSE SERPL-MCNC: 68 MG/DL (ref 70–99)
HCT VFR BLD AUTO: 25.3 % (ref 40.5–52.5)
HGB BLD-MCNC: 8 G/DL (ref 13.5–17.5)
MCH RBC QN AUTO: 20.9 PG (ref 26–34)
MCHC RBC AUTO-ENTMCNC: 31.5 G/DL (ref 31–36)
MCV RBC AUTO: 66.2 FL (ref 80–100)
PATH INTERP BLD-IMP: NO
PLATELET # BLD AUTO: 98 K/UL (ref 135–450)
PLATELET BLD QL SMEAR: ABNORMAL
PMV BLD AUTO: 8.5 FL (ref 5–10.5)
POTASSIUM SERPL-SCNC: 4 MMOL/L (ref 3.5–5.1)
RBC # BLD AUTO: 3.82 M/UL (ref 4.2–5.9)
SLIDE REVIEW: ABNORMAL
SODIUM SERPL-SCNC: 133 MMOL/L (ref 136–145)
WBC # BLD AUTO: 5.4 K/UL (ref 4–11)

## 2025-04-20 PROCEDURE — 93005 ELECTROCARDIOGRAM TRACING: CPT | Performed by: INTERNAL MEDICINE

## 2025-04-20 PROCEDURE — 85027 COMPLETE CBC AUTOMATED: CPT

## 2025-04-20 PROCEDURE — 80048 BASIC METABOLIC PNL TOTAL CA: CPT

## 2025-04-20 PROCEDURE — 2060000000 HC ICU INTERMEDIATE R&B

## 2025-04-20 PROCEDURE — 93005 ELECTROCARDIOGRAM TRACING: CPT | Performed by: NURSE PRACTITIONER

## 2025-04-20 PROCEDURE — 6360000002 HC RX W HCPCS: Performed by: NURSE PRACTITIONER

## 2025-04-20 PROCEDURE — 6370000000 HC RX 637 (ALT 250 FOR IP): Performed by: NURSE PRACTITIONER

## 2025-04-20 PROCEDURE — 36415 COLL VENOUS BLD VENIPUNCTURE: CPT

## 2025-04-20 PROCEDURE — 2500000003 HC RX 250 WO HCPCS: Performed by: NURSE PRACTITIONER

## 2025-04-20 PROCEDURE — 6370000000 HC RX 637 (ALT 250 FOR IP)

## 2025-04-20 RX ADMIN — PANTOPRAZOLE SODIUM 40 MG: 40 INJECTION, POWDER, FOR SOLUTION INTRAVENOUS at 21:10

## 2025-04-20 RX ADMIN — APIXABAN 5 MG: 5 TABLET, FILM COATED ORAL at 12:03

## 2025-04-20 RX ADMIN — MEXILETINE HYDROCHLORIDE 200 MG: 200 CAPSULE ORAL at 08:41

## 2025-04-20 RX ADMIN — DOFETILIDE 125 MCG: 0.12 CAPSULE ORAL at 08:41

## 2025-04-20 RX ADMIN — APIXABAN 5 MG: 5 TABLET, FILM COATED ORAL at 21:08

## 2025-04-20 RX ADMIN — SUCRALFATE 1 G: 1 TABLET ORAL at 06:01

## 2025-04-20 RX ADMIN — Medication 10 ML: at 21:10

## 2025-04-20 RX ADMIN — Medication 100 MG: at 08:41

## 2025-04-20 RX ADMIN — DOFETILIDE 125 MCG: 0.12 CAPSULE ORAL at 21:08

## 2025-04-20 RX ADMIN — SUCRALFATE 1 G: 1 TABLET ORAL at 15:28

## 2025-04-20 RX ADMIN — SUCRALFATE 1 G: 1 TABLET ORAL at 21:09

## 2025-04-20 RX ADMIN — SPIRONOLACTONE 50 MG: 25 TABLET ORAL at 08:41

## 2025-04-20 RX ADMIN — MEXILETINE HYDROCHLORIDE 200 MG: 200 CAPSULE ORAL at 21:09

## 2025-04-20 RX ADMIN — PANTOPRAZOLE SODIUM 40 MG: 40 INJECTION, POWDER, FOR SOLUTION INTRAVENOUS at 08:42

## 2025-04-20 RX ADMIN — ATORVASTATIN CALCIUM 40 MG: 40 TABLET, FILM COATED ORAL at 21:09

## 2025-04-20 RX ADMIN — Medication 10 ML: at 08:42

## 2025-04-20 NOTE — PLAN OF CARE
Problem: Pain  Goal: Verbalizes/displays adequate comfort level or baseline comfort level  4/20/2025 0838 by Belkis Vargas RN  Outcome: Progressing     Problem: ABCDS Injury Assessment  Goal: Absence of physical injury  4/20/2025 0838 by Belkis Vargas RN  Outcome: Progressing     Problem: Safety - Adult  Goal: Free from fall injury  4/20/2025 0838 by Belkis Vargas RN  Outcome: Progressing     Problem: Chronic Conditions and Co-morbidities  Goal: Patient's chronic conditions and co-morbidity symptoms are monitored and maintained or improved  4/20/2025 0838 by Belkis Vargas RN  Outcome: Progressing

## 2025-04-21 ENCOUNTER — TELEPHONE (OUTPATIENT)
Dept: CARDIAC CATH/INVASIVE PROCEDURES | Age: 75
End: 2025-04-21

## 2025-04-21 VITALS
TEMPERATURE: 98.4 F | HEART RATE: 79 BPM | DIASTOLIC BLOOD PRESSURE: 83 MMHG | WEIGHT: 171.2 LBS | RESPIRATION RATE: 20 BRPM | BODY MASS INDEX: 23.19 KG/M2 | HEIGHT: 72 IN | SYSTOLIC BLOOD PRESSURE: 160 MMHG | OXYGEN SATURATION: 97 %

## 2025-04-21 LAB
ANION GAP SERPL CALCULATED.3IONS-SCNC: 8 MMOL/L (ref 3–16)
BUN SERPL-MCNC: 14 MG/DL (ref 7–20)
CALCIUM SERPL-MCNC: 7.9 MG/DL (ref 8.3–10.6)
CHLORIDE SERPL-SCNC: 104 MMOL/L (ref 99–110)
CO2 SERPL-SCNC: 20 MMOL/L (ref 21–32)
CREAT SERPL-MCNC: 1.2 MG/DL (ref 0.8–1.3)
EKG ATRIAL RATE: 79 BPM
EKG ATRIAL RATE: 84 BPM
EKG DIAGNOSIS: NORMAL
EKG DIAGNOSIS: NORMAL
EKG P AXIS: 72 DEGREES
EKG P AXIS: 84 DEGREES
EKG P-R INTERVAL: 178 MS
EKG P-R INTERVAL: 198 MS
EKG Q-T INTERVAL: 400 MS
EKG Q-T INTERVAL: 410 MS
EKG QRS DURATION: 84 MS
EKG QRS DURATION: 90 MS
EKG QTC CALCULATION (BAZETT): 458 MS
EKG QTC CALCULATION (BAZETT): 484 MS
EKG R AXIS: 27 DEGREES
EKG R AXIS: 37 DEGREES
EKG T AXIS: 45 DEGREES
EKG T AXIS: 52 DEGREES
EKG VENTRICULAR RATE: 79 BPM
EKG VENTRICULAR RATE: 84 BPM
GFR SERPLBLD CREATININE-BSD FMLA CKD-EPI: 63 ML/MIN/{1.73_M2}
GLUCOSE SERPL-MCNC: 81 MG/DL (ref 70–99)
HCT VFR BLD AUTO: 24.8 % (ref 40.5–52.5)
HGB BLD-MCNC: 7.9 G/DL (ref 13.5–17.5)
POTASSIUM SERPL-SCNC: 3.9 MMOL/L (ref 3.5–5.1)
SODIUM SERPL-SCNC: 132 MMOL/L (ref 136–145)

## 2025-04-21 PROCEDURE — 99232 SBSQ HOSP IP/OBS MODERATE 35: CPT | Performed by: NURSE PRACTITIONER

## 2025-04-21 PROCEDURE — 85014 HEMATOCRIT: CPT

## 2025-04-21 PROCEDURE — 85018 HEMOGLOBIN: CPT

## 2025-04-21 PROCEDURE — 6370000000 HC RX 637 (ALT 250 FOR IP)

## 2025-04-21 PROCEDURE — 80048 BASIC METABOLIC PNL TOTAL CA: CPT

## 2025-04-21 PROCEDURE — 6370000000 HC RX 637 (ALT 250 FOR IP): Performed by: NURSE PRACTITIONER

## 2025-04-21 PROCEDURE — 6360000002 HC RX W HCPCS: Performed by: NURSE PRACTITIONER

## 2025-04-21 PROCEDURE — 36415 COLL VENOUS BLD VENIPUNCTURE: CPT

## 2025-04-21 PROCEDURE — 93010 ELECTROCARDIOGRAM REPORT: CPT | Performed by: INTERNAL MEDICINE

## 2025-04-21 RX ORDER — DOFETILIDE 0.12 MG/1
125 CAPSULE ORAL EVERY 12 HOURS SCHEDULED
Qty: 60 CAPSULE | Refills: 3 | Status: SHIPPED | OUTPATIENT
Start: 2025-04-21

## 2025-04-21 RX ORDER — MEXILETINE HYDROCHLORIDE 200 MG/1
200 CAPSULE ORAL 2 TIMES DAILY
Qty: 90 CAPSULE | Refills: 3 | Status: SHIPPED | OUTPATIENT
Start: 2025-04-21

## 2025-04-21 RX ADMIN — Medication 100 MG: at 09:23

## 2025-04-21 RX ADMIN — PANTOPRAZOLE SODIUM 40 MG: 40 INJECTION, POWDER, FOR SOLUTION INTRAVENOUS at 09:24

## 2025-04-21 RX ADMIN — SPIRONOLACTONE 50 MG: 25 TABLET ORAL at 09:23

## 2025-04-21 RX ADMIN — DOFETILIDE 125 MCG: 0.12 CAPSULE ORAL at 09:23

## 2025-04-21 RX ADMIN — MEXILETINE HYDROCHLORIDE 200 MG: 200 CAPSULE ORAL at 09:23

## 2025-04-21 RX ADMIN — APIXABAN 5 MG: 5 TABLET, FILM COATED ORAL at 09:23

## 2025-04-21 RX ADMIN — SUCRALFATE 1 G: 1 TABLET ORAL at 05:34

## 2025-04-21 NOTE — PROGRESS NOTES
PROGRESS NOTE    HPI: Jamal Hutchison is a(n)74 y.o. male admitted for work-up and treatment for Atrial flutter with rapid ventricular response (HCC) [I48.92]  Atrial fibrillation with RVR (HCC) [I48.91].     We are following for melena.    Subjective:     No bleeding reported.  He wants to go home.      Objective:     I/O last 3 completed shifts:  In: 1440 [P.O.:1440]  Out: 0       BP (!) 160/83   Pulse 79   Temp 98.4 °F (36.9 °C)   Resp 20   Ht 1.829 m (6')   Wt 77.7 kg (171 lb 3.2 oz)   SpO2 97%   BMI 23.22 kg/m²     Physical Exam:  HEENT: anicteric sclera, oropharyngeal membranes pink and moist.  Cor: RRR  Lungs: non-labored, no respiratory distress  Abdomen: soft,  NT. No ascites.  No hepatomegaly or splenomegaly  Extremities: no edema  Neuro: alert and oriented x 3      Results:   Lab Results   Component Value Date    ALT 25 04/16/2025    AST 37 04/16/2025    ALKPHOS 151 (H) 04/16/2025    BILIDIR 0.6 (H) 04/10/2025    INR 2.39 (H) 04/19/2025     Lab Results   Component Value Date    WBC 5.4 04/20/2025    HGB 7.9 (L) 04/21/2025    HCT 24.8 (L) 04/21/2025    MCV 66.2 (L) 04/20/2025    PLT 98 (L) 04/20/2025     BUN/Cr/glu/ALT/AST/amyl/lip:  14/1.2/--/--/--/--/-- (04/21 0437)  XR CHEST PORTABLE  Result Date: 4/16/2025  1. Subtle area of increased density overlying the posterior left sixth rib. Finding favored to be related to overlap shadow. Subtle area of minimal parenchymal disease cannot totally be excluded. Follow-up formal PA and lateral views of the chest would be helpful for reevaluation. 2. Otherwise, no evidence of acute cardiopulmonary disease. Electronically signed by Shamar Lobo    CTA ABDOMEN PELVIS W CONTRAST  Result Date: 4/16/2025  1. Superior mesenteric artery mild to moderate proximal narrowing but distal branches are opacified. 2. Patent abdominal aorta with atherosclerosis and infrarenal aortic fusiform ectasia similar to previous 
                                     PROGRESS NOTE  S:74 yrs Patient  admitted on 4/16/2025 with Atrial flutter with rapid ventricular response (HCC) [I48.92]  Atrial fibrillation with RVR (HCC) [I48.91] .    No active bleeding    Current Hospital Schedued Meds   apixaban  5 mg Oral BID    pantoprazole  40 mg IntraVENous BID    dofetilide  125 mcg Oral 2 times per day    mexiletine  200 mg Oral BID    sodium chloride flush  5-40 mL IntraVENous 2 times per day    [Held by provider] aspirin  81 mg Oral Daily    atorvastatin  40 mg Oral Nightly    [Held by provider] colchicine  0.6 mg Oral Daily    spironolactone  50 mg Oral Daily    thiamine  100 mg Oral Daily    sucralfate  1 g Oral 3 times per day     Current Hospital IV Meds   sodium chloride      sodium chloride       Current Hospital PRN Meds  sodium chloride, sodium chloride flush, sodium chloride, polyethylene glycol, acetaminophen **OR** acetaminophen    Exam:   Vitals:    04/20/25 1527   BP: (!) 160/78   Pulse: 78   Resp: 18   Temp: 98.1 °F (36.7 °C)   SpO2: 100%     I/O last 3 completed shifts:  In: 1080 [P.O.:1080]  Out: 0    General appearance: alert, appears stated age, and cooperative  HEENT: PERRLA  Neck: no adenopathy, no carotid bruit, no JVD, supple, symmetrical, trachea midline, and thyroid not enlarged, symmetric, no tenderness/mass/nodules  Lungs: clear to auscultation bilaterally  Heart: regular rate and rhythm, S1, S2 normal, no murmur, click, rub or gallop  Abdomen: soft, non-tender; bowel sounds normal; no masses,  no organomegaly  Extremities: extremities normal, atraumatic, no cyanosis or edema     Labs:  CBC:   Recent Labs     04/18/25  2231 04/19/25  1517 04/20/25  0514   WBC  --   --  5.4   HGB 7.8* 8.7* 8.0*   HCT 24.9* 27.8* 25.3*   MCV  --   --  66.2*   PLT  --   --  98*     BMP:   Recent Labs     04/18/25  0429 04/19/25  0417 04/20/25  0514   * 133* 133*   K 4.0 4.2 4.0    104 104   CO2 22 21 21   BUN 21* 18 18 
                                     PROGRESS NOTE  S:74 yrs Patient  admitted on 4/16/2025 with Atrial flutter with rapid ventricular response (HCC) [I48.92]  Atrial fibrillation with RVR (HCC) [I48.91] .    No bleeding.  Would like to go home    Current Hospital Schedued Meds   pantoprazole  40 mg IntraVENous BID    dofetilide  125 mcg Oral 2 times per day    mexiletine  200 mg Oral BID    sodium chloride flush  5-40 mL IntraVENous 2 times per day    [Held by provider] aspirin  81 mg Oral Daily    atorvastatin  40 mg Oral Nightly    [Held by provider] colchicine  0.6 mg Oral Daily    spironolactone  50 mg Oral Daily    thiamine  100 mg Oral Daily    sucralfate  1 g Oral 3 times per day    [Held by provider] apixaban  5 mg Oral BID     Current Hospital IV Meds   sodium chloride      octreotide (SANDOSTATIN) 500 mcg in sodium chloride 0.9 % 100 mL infusion 25 mcg/hr (04/19/25 0617)    sodium chloride       Current Hospital PRN Meds  sodium chloride, sodium chloride flush, sodium chloride, polyethylene glycol, acetaminophen **OR** acetaminophen    Exam:   Vitals:    04/19/25 1150   BP: (!) 165/79   Pulse: 78   Resp: 20   Temp: 97.9 °F (36.6 °C)   SpO2: 100%     I/O last 3 completed shifts:  In: 1216.3 [P.O.:840; I.V.:100; Blood:276.3]  Out: 0    General appearance: alert, appears stated age, and cooperative  HEENT: PERRLA  Neck: no adenopathy, no carotid bruit, no JVD, supple, symmetrical, trachea midline, and thyroid not enlarged, symmetric, no tenderness/mass/nodules  Lungs: clear to auscultation bilaterally  Heart: regular rate and rhythm, S1, S2 normal, no murmur, click, rub or gallop  Abdomen: soft, non-tender; bowel sounds normal; no masses,  no organomegaly  Extremities: extremities normal, atraumatic, no cyanosis or edema     Labs:  CBC:   Recent Labs     04/17/25  0424 04/17/25  1215 04/18/25  1830 04/18/25  2231 04/19/25  1517   WBC 5.4  --   --   --   --    HGB 7.5*   < > 8.6* 7.8* 8.7*   HCT 24.5*   < > 
    [facility]     Electrophysiology                                     Progress Note    Admission date:  2025    Reason for follow up visit: AF    HPI/CC: Jamal Hutchison was admitted on 2025 with recurrent rapid AF s/p ablation 2025. He had been experiencing nausea and malaise. Labs revealed hyponatremia and anemia. Amiodarone was stopped and Tikosyn and mexiletine were started. He had guaiac positive stool and GI was consulted. He had an EGD where no esophageal varices or stigmata of bleeding were noted. Eliquis was resumed. Rhythm has been sinus.     Subjective: He is ready to go home. He is feeling well. Denies chest pain, palpitations, shortness of breath, and dizziness.        Vitals:  Blood pressure (!) 150/85, pulse 84, temperature 98.1 °F (36.7 °C), temperature source Oral, resp. rate 20, height 1.829 m (6'), weight 77.7 kg (171 lb 3.2 oz), SpO2 100%.  Temp  Av.1 °F (36.7 °C)  Min: 97.9 °F (36.6 °C)  Max: 98.2 °F (36.8 °C)  Pulse  Av.7  Min: 76  Max: 89  BP  Min: 143/69  Max: 166/80  SpO2  Av.6 %  Min: 98 %  Max: 100 %    24 hour I/O    Intake/Output Summary (Last 24 hours) at 2025 1109  Last data filed at 2025  Gross per 24 hour   Intake 720 ml   Output --   Net 720 ml     Current Facility-Administered Medications   Medication Dose Route Frequency Provider Last Rate Last Admin    apixaban (ELIQUIS) tablet 5 mg  5 mg Oral BID Jaqui Sue APRN - CNP   5 mg at 25 0923    pantoprazole (PROTONIX) injection 40 mg  40 mg IntraVENous BID Dallas Masterson APRN - CNP   40 mg at 25 0924    0.9 % sodium chloride infusion   IntraVENous PRN Dallas Masterson APRN - CNP        dofetilide (TIKOSYN) capsule 125 mcg  125 mcg Oral 2 times per day Tiffanie Contreras APRN - CNP   125 mcg at 25 0923    mexiletine (MEXITIL) capsule 200 mg  200 mg Oral BID Tiffanie Contreras APRN - CNP   200 mg at 25 0923    sodium chloride flush 0.9 % injection 5-40 mL  
    [facility]     Electrophysiology                                     Progress Note    Admission date:  2025    Reason for follow up visit: Atrial fibrillation    HPI/CC: Jamal Hutchison was admitted on 25 with fatigue, dizziness, lightheadedness and abdominal pain.  Patient was in his usual state of health until Saturday when he began to suffer from nausea and needs.  He was unable to eat due to nausea.  He was taking Pepto-Bismol x 1 day at home.  Hemoglobin 7.5.  Patient was found to be in rapid atrial fibrillation.  Diltiazem drip initiated.  EP consulted.  Amiodarone discontinued and Tikosyn initiated    Rhythm has been SR    Subjective: Patient denies chest pain shortness of breath and palpitations.  Patient states he had a dark black tarry stool overnight.  His stool guaiac was positive.  His hemoglobin is 7.2 this morning.  GI has been consulted    Vitals:  Blood pressure 135/73, pulse 84, temperature 98.2 °F (36.8 °C), temperature source Oral, resp. rate 20, height 1.829 m (6'), weight 77.7 kg (171 lb 3.2 oz), SpO2 99%.  Temp  Av °F (36.7 °C)  Min: 97.5 °F (36.4 °C)  Max: 98.2 °F (36.8 °C)  Pulse  Av.4  Min: 84  Max: 96  BP  Min: 126/72  Max: 154/74  SpO2  Av.2 %  Min: 99 %  Max: 100 %    24 hour I/O    Intake/Output Summary (Last 24 hours) at 2025 1231  Last data filed at 2025 0907  Gross per 24 hour   Intake 600 ml   Output 0 ml   Net 600 ml     Current Facility-Administered Medications   Medication Dose Route Frequency Provider Last Rate Last Admin    pantoprazole (PROTONIX) injection 40 mg  40 mg IntraVENous BID Dallas Masterson APRN - CNP   40 mg at 25 0854    0.9 % sodium chloride infusion   IntraVENous PRN Dallas Masterson APRN - CNP        octreotide (SANDOSTATIN) injection 50 mcg  50 mcg IntraVENous Once Jami Pope APRN - CNP        octreotide (SANDOSTATIN) 500 mcg in sodium chloride 0.9 % 100 mL infusion  25 mcg/hr IntraVENous Continuous Niko 
  Hospital Medicine Progress Note  V 1.6      Date of Admission: 4/16/2025    Hospital Day: 2      Chief Admission Complaint:  Irregular Heart Beat (Pt had a cardiac ablation last week. Now in a flutter per EMS with a rate of 110-140. Pt has been feeling more tired lately. Having groin and abd pain as well. Feels lightheaded, dizzy, n/v. 3/10 pain)       Subjective:  no voiced complaints    Presenting Admission History:       74 y.o. male with a PMH of paroxysmal atrial fibrillation, stroke, critical carotid stenosis, alcohol abuse, hyperlipidemia, and hypertension who presented to [facility] with a complaint of fatigue over the last couple days. He did report mild abd discomfort, right groin pain, n/v and feeling lightheaded.      Patient is s/p RFCA of AF 4/9/2025, discharged on amiodarone, colchicine, and carafate.      In ED, ECG with afib 's, covid/flu negative, H&H 8.5/28, BNP 1861, Trop 55->36, , CTA abdomen pelvis does not show any acute process. Cardizem gtt initiated.       Assessment/Plan:      Current Principal Problem:  Atrial fibrillation with RVR (HCC)    Afib rvr  -s/p RFCA of AF 4/9/2025   -Continue Eliquis, Amiodarone stopped  -Tikosyn started  -PPI BID  -Colchicine 0.6 mg daily x 10 days post ablation   -Carafate 1 gram PO QID x 2 weeks post ablation   -Cardizem gtt  -alk phos 151, total bili 1.6  -Cardiology consulted     Hypertension  -Stable  -On Cardizem 120mg daily     Hyperlipidemia  -Cont statin     History of CVA  -ASA/Statin     Chronic alcohol use  -Reports quit  -Continue thiamine     Anemia  -iron studies  -FOBT    Ongoing threat to life and/or bodily function without ongoing treatment due to:      Consults:      IP CONSULT TO CARDIOLOGY      Cardiology consulted and available notes from yesterday and today were reviewed on 4/17/2025, w/ plan for continued inpatient w/up and management - as above  
  Hospital Medicine Progress Note  V 1.6      Date of Admission: 4/16/2025    Hospital Day: 3      Chief Admission Complaint:  Irregular Heart Beat (Pt had a cardiac ablation last week. Now in a flutter per EMS with a rate of 110-140. Pt has been feeling more tired lately. Having groin and abd pain as well. Feels lightheaded, dizzy, n/v. 3/10 pain)       Subjective:  black tarry stool overnight, patient denies n/v, cp, or abd pain-VSS. Discussed plan of care-blood transfusion, GI consult    Presenting Admission History:       74 y.o. male with a PMH of paroxysmal atrial fibrillation, stroke, critical carotid stenosis, alcohol abuse, hyperlipidemia, and hypertension who presented to [facility] with a complaint of fatigue over the last couple days. He did report mild abd discomfort, right groin pain, n/v and feeling lightheaded.      Patient is s/p RFCA of AF 4/9/2025, discharged on amiodarone, colchicine, and carafate.      In ED, ECG with afib 's, covid/flu negative, H&H 8.5/28, BNP 1861, Trop 55->36, , CTA abdomen pelvis does not show any acute process. Cardizem gtt initiated.       Assessment/Plan:      Current Principal Problem:  Atrial fibrillation with RVR (HCC)    Afib rvr  -s/p RFCA of AF 4/9/2025   -Continue Eliquis, ASA on hold  -Amiodarone stopped  -Tikosyn started  -Mexitil started  -PPI BID  -Colchicine 0.6 mg daily x 10 days post ablation-on hold  -Carafate 1 gram PO QID x 2 weeks post ablation   -Cardizem gtt  -alk phos 151, total bili 1.6  -Cardiology consulted    ABLA  GIB  -GI consulted  -PPI BID  -Octreotide initiated  -Rocephin  -Tranfuse 1 unit PRBC's (discussed with patient consented)  -serial H&H  -EGD 4/18 pending    Alcoholic Liver Cirrhosis  Gastric varices  Mild to moderate abdominopelvic ascites   -GI consulted-follows with Dr. Hdz  -INR 2.46     Hypertension  -Stable  -On Cardizem 120mg daily on hold     Hyperlipidemia  -Cont statin     History of CVA  -ASA 
  Hospital Medicine Progress Note  V 1.6      Date of Admission: 4/16/2025    Hospital Day: 5      Chief Admission Complaint:  Irregular Heart Beat (Pt had a cardiac ablation last week. Now in a flutter per EMS with a rate of 110-140. Pt has been feeling more tired lately. Having groin and abd pain as well. Feels lightheaded, dizzy, n/v. 3/10 pain)       Subjective:  NAD, anxious to dc home, s/p EGD 4/19, SR-no new complaints or events overnight      Presenting Admission History:       74 y.o. male with a PMH of paroxysmal atrial fibrillation, stroke, critical carotid stenosis, alcohol abuse, hyperlipidemia, and hypertension who presented to [facility] with a complaint of fatigue over the last couple days. He did report mild abd discomfort, right groin pain, n/v and feeling lightheaded.      Patient is s/p RFCA of AF 4/9/2025, discharged on amiodarone, colchicine, and carafate.      In ED, ECG with afib 's, covid/flu negative, H&H 8.5/28, BNP 1861, Trop 55->36, , CTA abdomen pelvis does not show any acute process. Cardizem gtt initiated.       Assessment/Plan:      Current Principal Problem:  Atrial fibrillation with RVR (HCC)    Afib rvr  -s/p RFCA of AF 4/9/2025   -Eliquis, ASA on hold  -Amiodarone stopped  -Tikosyn started  -Mexitil started  -PPI BID  -Colchicine 0.6 mg daily x 10 days post ablation-on hold  -Carafate 1 gram PO QID x 2 weeks post ablation   -alk phos 151, total bili 1.6  -Cardiology consulted    ABLA  GIB  -GI consulted  -PPI BID  -Octreotide initiated-d/c'd  -Rocephin  -Tranfuse 1 unit PRBC's (discussed with patient consented)  -serial H&H  -EGD 4/18 Focal ectopic mucosa was noted at the distal esophagus. Biopsies were obtained , Portal hypertensive gastropathy was seen in the proximal stomach, no evidence of varices    Alcoholic Liver Cirrhosis  Gastric varices  Mild to moderate abdominopelvic ascites   -GI consulted-follows with Dr. Hdz  -INR 2.39   
  Hospital Medicine Progress Note  V 1.6      Date of Admission: 4/16/2025    Hospital Day: 6      Chief Admission Complaint:  Irregular Heart Beat (Pt had a cardiac ablation last week. Now in a flutter per EMS with a rate of 110-140. Pt has been feeling more tired lately. Having groin and abd pain as well. Feels lightheaded, dizzy, n/v. 3/10 pain)       Subjective:  NAD, SR-no new complaints or events overnight      Presenting Admission History:       74 y.o. male with a PMH of paroxysmal atrial fibrillation, stroke, critical carotid stenosis, alcohol abuse, hyperlipidemia, and hypertension who presented to [facility] with a complaint of fatigue over the last couple days. He did report mild abd discomfort, right groin pain, n/v and feeling lightheaded.      Patient is s/p RFCA of AF 4/9/2025, discharged on amiodarone, colchicine, and carafate.      In ED, ECG with afib 's, covid/flu negative, H&H 8.5/28, BNP 1861, Trop 55->36, , CTA abdomen pelvis does not show any acute process. Cardizem gtt initiated.       Assessment/Plan:      Current Principal Problem:  Atrial fibrillation with RVR (HCC)    Afib rvr  -s/p RFCA of AF 4/9/2025   -Eliquis, ASA on hold  -Amiodarone stopped  -Tikosyn started  -Mexitil started  -PPI BID  -Colchicine 0.6 mg daily x 10 days post ablation-on hold  -Carafate 1 gram PO QID x 2 weeks post ablation   -alk phos 151, total bili 1.6  -Cardiology consulted    ABLA  GIB  thrombocytopenia  -GI consulted  -PPI BID  -Octreotide initiated-d/c'd  -Rocephin  -Tranfuse 1 unit PRBC's (discussed with patient consented)  -serial H&H  -EGD 4/18 Focal ectopic mucosa was noted at the distal esophagus. Biopsies were obtained , Portal hypertensive gastropathy was seen in the proximal stomach, no evidence of varices    Alcoholic Liver Cirrhosis  Gastric varices  Mild to moderate abdominopelvic ascites   -GI consulted-follows with Dr. Hdz  -INR 2.39     Hypertension  -Stable  -On 
  Physician Progress Note      PATIENT:               TACHO MONSON  CSN #:                  [id number]  :                       1950  ADMIT DATE:       2025 8:35 AM  DISCH DATE:  RESPONDING  PROVIDER #:        ALDO ZHU          QUERY TEXT:    Based on your medical judgment, please clarify these findings and document if   any of the following are being evaluated and/or treated:    The clinical indicators include:  ---H&P  Irregular Heart Beat Now in a flutter per EMS with a rate of   110-140.Afib rvr,s/p RFCA of AF 2025,-Continue Eliquis, Amiodarone  -- Cardiology CN  Persistent atrial fibrillation ([id number] score at   least 5).Plan for cardioversion on Friday if continues to be in atrial   fibrillation.  --Electrophysiology PN  Paroxsymal Atrial fibrillation   -[id number]   score 5--age, HTN, stroke, vascular disease    Thank You  Maria Isabel Monsalve [facility] CDS  Options provided:  -- Secondary hypercoagulable state in a patient with atrial fibrillation  -- Other - I will add my own diagnosis  -- Disagree - Not applicable / Not valid  -- Disagree - Clinically unable to determine / Unknown  -- Refer to Clinical Documentation Reviewer    PROVIDER RESPONSE TEXT:    This patient has secondary hypercoagulable state in a patient with atrial   fibrillation.    Query created by: Maria Isabel Miller on 2025 3:06 AM      Electronically signed by:  ALDO ZHU 2025 3:10 AM          
Alyssa RN called report to floor Nurse and Awake and alert with no complaints. VS stable. Meets PACU phase 2 discharge criteria. CMU called to confirm monitor capture. Bed and buttocks checked for incontinence - dry and clean. Ready to go to room.   
Blood transfusion consent signed by patient.   
EGD consent signed by patient.   
Occult stool collected at this time, large black tarry BM. Stat H&H ordered.  
Oriented pt to call light  (in patients reach) / verbalized understanding /demonstrates use  Bed position is in low   Side rails up X 2 /gurney locked   
PIVs removed, DC reviewed with pt meds picked up from pharmacy by the pts wife. Sent with belongings. Tele removed.   
Patient transfer to [facility] [place] [place] for EGD.   KATHYA Treadwell made aware pt will need 1100 EKG for tikosyn dosing.   
Pt alert x 4, no family at bedside checked in lobby no family in lobby per pt \"wife Juana will be in his room\".   
[facility]   Daily Progress Note    Admit Date:  4/16/2025  HPI:    Chief Complaint   Patient presents with    Irregular Heart Beat     Pt had a cardiac ablation last week. Now in a flutter per EMS with a rate of 110-140. Pt has been feeling more tired lately. Having groin and abd pain as well. Feels lightheaded, dizzy, n/v. 3/10 pain      Jamal Hutchison presented with nausea and failure to thrive    Cardiology consulted for A-fib with RVR    PMH: A-fib with RVR s/p recent ablation, PAD, cerebrovascular disease, hypertension, alcoholic cirrhosis, hyperlipidemia    Subjective:  Mr. Hutchison anxious for discharge.  Denies any chest pain, palpitations or shortness of breath.  No further bloody stools but has not had a BM since EGD.    Reviewed EKGs with Dr. Sabrina Torres    Objective:   Patient Vitals for the past 24 hrs:   BP Temp Temp src Pulse Resp SpO2   04/19/25 1150 (!) 165/79 97.9 °F (36.6 °C) Oral 78 20 100 %   04/19/25 0803 (!) 150/77 97.9 °F (36.6 °C) Oral 80 18 98 %   04/19/25 0415 (!) 150/70 97.8 °F (36.6 °C) Oral 79 18 98 %   04/18/25 2355 (!) 168/85 97.3 °F (36.3 °C) Oral 80 17 98 %   04/18/25 1945 (!) 153/80 97.9 °F (36.6 °C) Oral 82 18 100 %   04/18/25 1642 (!) 153/76 98.1 °F (36.7 °C) Oral 79 16 100 %   04/18/25 1438 (!) 161/78 98.1 °F (36.7 °C) Oral 77 16 100 %   04/18/25 1423 135/82 98.1 °F (36.7 °C) Oral 78 16 100 %   04/18/25 1421 -- -- Oral 80 16 100 %   04/18/25 1407 (!) 123/59 97.9 °F (36.6 °C) Oral 82 16 100 %   04/18/25 1337 -- -- -- 80 -- --   04/18/25 1335 135/69 -- -- 81 18 100 %   04/18/25 1315 138/67 -- -- 80 18 99 %   04/18/25 1310 138/65 -- -- 81 17 97 %       Intake/Output Summary (Last 24 hours) at 4/19/2025 1309  Last data filed at 4/19/2025 0953  Gross per 24 hour   Intake 636.25 ml   Output 0 ml   Net 636.25 ml     Wt Readings from Last 3 Encounters:   04/16/25 77.7 kg (171 lb 3.2 oz)   04/10/25 79.7 kg (175 lb 9.6 oz)   02/27/25 78.2 kg (172 lb 6.4 oz)         ASSESSMENT: 
Received in PACU. Report received from endo nurse and CRNA. Drowsy but arouses easily to verbal stimuli. Offers no complaints.  
    Telemetry:      Personally reviewed and interpreted telemetry (Rhythm Strip) on 4/19/2025 with the following findings: SR    Diet: ADULT DIET; Regular    DVT Prophylaxis: []PPx LMWH  []SQ Heparin  [x]IPC/SCDs  []Eliquis  []Xarelto  []Coumadin  [] Heparin Drip  []Other -      Code status: Full Code    PT/OT Eval Status:   [x]NOT yet ordered  []Ordered and Pending   []Seen with Recommendations for:   []Home independently  []Home w/ assist  []HHC  []SNF  []Acute Rehab    Multi-Disciplinary Rounds with Case Management completed on 4/19/2025 with the following recommendations:  Anticipated Discharge Location: [x]Home w/ []HHC vs []SNF  []Acute Rehab  []LTAC  []Hospice  []Other -    Anticipated Discharge Day/Date:  >1- 2 days  Barriers to Discharge:   --------------------------------------------------    MDM (any 2 required for High level billing)    A. Problems (any 1)  [x] Acute/Chronic Illness/injury posing ongoing threat to life and/or bodily function without ongoing treatment  afib rvr, GIB  [] Severe exacerbation of chronic illness    --------------------------------------------------  B. Risk of Treatment (any 1)    [x] Drugs/treatments that require intensive monitoring for toxicity    [] IV ABX (Vancomycin, Aminoglycosides, etc)     [] Post-Cath/Contrast study requiring serial monitoring    [] IV Narcotic analgesia    [] Aggressive IV diuresis    [] Hypertonic Saline    [] Critical electrolyte abnormalities requiring IV replacement    [] Insulin - Scheduled/SSI or Insulin gtt    [] Anticoagulation (Heparin gtt or Coumadin - other anticoagulants in special circumstances)    [x] Cardiac Medications (IV Amiodarone/Diltiazem, Tikosyn, etc)    [] Hemodialysis    [] Other -    [] Change in code status    [] Decision to escalate care    [x] Major surgery/procedure with associated risk factors: EGD 4/18  --------------------------------------------------  C. Data (any 2)    [x] Data Review (any 3)    [x] Consultant 
211 05/15/2023 06:22 AM     Cardiac Enzymes:  CK/MbTroponinNo results found for: \"CKTOTAL\", \"CKMB\", \"CKMBINDEX\", \"TROPONINI\"  PT/ INR   Lab Results   Component Value Date/Time    INR 3.30 04/17/2025 04:24 AM    INR 2.28 01/13/2025 11:42 AM    INR 2.05 12/04/2024 05:52 PM    PROTIME 33.3 04/17/2025 04:24 AM    PROTIME 25.1 01/13/2025 11:42 AM    PROTIME 23.2 12/04/2024 05:52 PM     PTT No components found for: \"PTT\"   Lab Results   Component Value Date/Time    MG 1.86 04/10/2025 04:58 AM      Lab Results   Component Value Date/Time    TSH 1.26 04/10/2025 04:58 AM       Assessment:  Paroxsymal Atrial fibrillation                -[id number] score 5--age, HTN, stroke, vascular disease   - Atrial fibrillation ablation 4/9/2025  Right punctate parietal lobe infarct              -thromboembolic vs cardio embolic   HTN  Anemia-hemoglobin 7.6  INR 3.3  Hyperbilirubinemia-total bilirubin 1.6  Critical left carotid artery stenosis-follows Dr Castaneda  Recently stopped drinking alcohol--12/2024  Alcoholic liver cirrhosis--follows with Dr. Hdz at [facility]      Plan:   Continue Tikosyn 250 mcg twice daily  Continue to monitor for drug toxicity  Add mexiletine 200 mg twice daily  Discontinue diltiazem drip  Continue Eliquis 5 mg twice daily  Continue aspirin 81 mg daily  Continue Lipitor 40 mg daily  Continue Aldactone 50 mg daily  Continue colchicine 0.6 mg daily  Continue Protonix 40 mg twice daily  Continue Carafate 1 g every 8 hours  Continue to monitor on telemetry  Repeat H&H now; stool guaiac--- if positive consult GI  Daily labs replace electrolytes as needed  Discussed plan with patient, wife, nursing, primary service, and Dr Murry    Time spent 50 minutes on plan and care      [facility]  [phone]        
5/2023:   Summary   Normal left ventricular systolic function with ejection fraction of 55-60%.   No regional wall motion abnormalites are seen.   Normal left ventricular size with mild concentric left ventricular   hypertrophy.   Grade I diastolic dysfunction with normal filling pressure.   Mild mitral regurgitation.   A bubble study was performed and fails to show evidence of shunting.   No tricuspid regurgitation to estimate systolic pulmonary artery pressure   (SPAP).     Time Based Itemization  A total of 50 minutes was spent on today's patient encounter.  If applicable, non-patient-facing activities:  (x )Preparing to see the patient and reviewing records  (x ) Individual interpretation of results  (x ) Discussion or coordination of care with other health care professionals  ( ) Ordering of unique tests, medications, or procedures  (x ) Documentation within the EHR

## 2025-04-21 NOTE — TELEPHONE ENCOUNTER
Janelle MORROW is requesting the patient be scheduled for an OV with Dr. Murry to discuss Watchman procedure.  He has an appointment July 1st for s/p ablation.  Thank You.

## 2025-04-21 NOTE — PLAN OF CARE
Problem: Safety - Adult  Goal: Free from fall injury  Outcome: Progressing   Pt will remain free from falls throughout hospital stay. Fall precautions in place, bed alarm on, bed in lowest position with wheels locked and side rails 2/4 up. Room door open and hourly rounding completed. Will continue to monitor throughout shift.     Problem: Pain  Goal: Verbalizes/displays adequate comfort level or baseline comfort level  Outcome: Progressing   Pt will be satisfied with pain control. Pt uses numeric pain rating scale with reassessments after pain med administration. Will continue to monitor progression throughout shift.

## 2025-04-21 NOTE — DISCHARGE SUMMARY
Hospital Medicine Discharge Summary    Patient: Jamal Hutchison   : 1950     Hospital:  [facility]  Admit Date: 2025   Discharge Date: 2025    Disposition:  Home   Code status:  Full  Condition at Discharge: Stable  Primary Care Provider: Main Velazco III, MD    Admitting Provider: Lars Mariscal DO  Discharge Provider: Dallas Masterson APRN - CNP     Discharge Diagnoses:      Active Hospital Problems    Diagnosis     ABLA (acute blood loss anemia) [D62]     Gastrointestinal hemorrhage [K92.2]     History of CVA (cerebrovascular accident) [Z86.73]     Atrial fibrillation with RVR (HCC) [I48.91]     PAF (paroxysmal atrial fibrillation) (HCC) [I48.0]     Left carotid stenosis [I65.22]     Atrial flutter with rapid ventricular response (HCC) [I48.92]        Presenting Admission History:      74 y.o. male with a PMH of paroxysmal atrial fibrillation, stroke, critical carotid stenosis, alcohol abuse, hyperlipidemia, and hypertension who presented to [facility] with a complaint of fatigue over the last couple days. He did report mild abd discomfort, right groin pain, n/v and feeling lightheaded.      Patient is s/p RFCA of AF 2025, discharged on amiodarone, colchicine, and carafate.      In ED, ECG with afib 's, covid/flu negative, H&H 8.5/28, BNP 1861, Trop 55->36, , CTA abdomen pelvis does not show any acute process. Cardizem gtt initiated.        Afib rvr  Secondary hypercoagulable state  -s/p RFCA of AF 2025   -Eliquis, ASA resumed  -Amiodarone stopped  -Tikosyn started  -Mexitil started  -PPI BID  -Colchicine 0.6 mg daily x 10 days post ablation-on hold  -Carafate 1 gram PO QID x 2 weeks post ablation   -alk phos 151, total bili 1.6  -Cardiology consulted     ABLA  GIB  thrombocytopenia  -GI consulted  -PPI BID  -Octreotide initiated-d/c'd  -Rocephin  -Tranfuse 1 unit PRBC's (discussed with patient consented)  -serial H&H  -EGD

## 2025-05-12 RX ORDER — DILTIAZEM HYDROCHLORIDE 120 MG/1
120 CAPSULE, COATED, EXTENDED RELEASE ORAL DAILY
Qty: 90 CAPSULE | Refills: 2 | Status: SHIPPED | OUTPATIENT
Start: 2025-05-12

## 2025-05-12 NOTE — TELEPHONE ENCOUNTER
Last Office Visit: 2/27/2025 Provider: BRITTNI  **Is provider OOT? No    Next Office Visit: 7/1/2025 Provider: NHUNG    **Has patient already had 30 day supply? Yes    Lab orders needed? no   Encounter provider correct? Yes If not, change provider  Script changes since last refill? no    LAST LABS:   EKG:  Encounter Date: 04/16/25   EKG 12 Lead   Result Value    Ventricular Rate 79    Atrial Rate 79    P-R Interval 198    QRS Duration 90    Q-T Interval 400    QTc Calculation (Bazett) 458    P Axis 72    R Axis 37    T Axis 52    Diagnosis      Normal sinus rhythmNormal ECGConfirmed by EARNESTINE VILLAFUERTE ([id number]) on 4/21/2025 6:34:42 PM       **Care Everywhere? no

## 2025-05-14 ENCOUNTER — TELEPHONE (OUTPATIENT)
Dept: CARDIOLOGY CLINIC | Age: 75
End: 2025-05-14

## 2025-05-14 NOTE — TELEPHONE ENCOUNTER
Pt's spouse called to ask how long pt needs to take mexiletine (MEXITIL) 200 MG capsule. She said this medication has been making him feel sick. She would like to know if he can either stop the medication or just take one pill a day. Please advice.

## 2025-05-14 NOTE — TELEPHONE ENCOUNTER
Dr. Ashford in hematology's office called regarding OAC. Patient has iron deficient anemia and they are asking that eliquis be discontinued. It looks like we already have patient scheduled for watchman consult 7/1/2025.     Call back for Dr Ashford's office is [phone]

## 2025-05-15 NOTE — TELEPHONE ENCOUNTER
Pt called to ask the same question about the Tikosyn as well. How long will pt need to be on it? Please advise. Thank you!

## 2025-05-16 NOTE — TELEPHONE ENCOUNTER
5/16 Called [phone]. M for pt to call and confirm if able to come to rs appt with MEDINAK on 6/3/25 at 11:15. Appt held for pt

## 2025-05-20 NOTE — TELEPHONE ENCOUNTER
Dr Ashford's office asking pt's wife if she heard anything from [facility] about reducing the dose of Eliquis. Pt's wife sts they want to reduce by 1/2 pill a day. Please advise.

## 2025-05-22 NOTE — TELEPHONE ENCOUNTER
Again, that is not ideal however I have done with I can to get him seen sooner.  I will defer to Dr. Ashford.  If they want to get seen before scheduled visit to discuss, can we set up with [facility]?  Maybe tomorrow?

## 2025-05-23 NOTE — TELEPHONE ENCOUNTER
I attempted to contact the patient to relay [facility] message. Please schedule with NPKK next Friday clinic. KARLA.

## 2025-05-28 ENCOUNTER — TRANSCRIBE ORDERS (OUTPATIENT)
Dept: ADMINISTRATIVE | Age: 75
End: 2025-05-28

## 2025-05-28 DIAGNOSIS — K70.30 ALCOHOLIC CIRRHOSIS OF LIVER WITHOUT ASCITES (HCC): Primary | ICD-10-CM

## 2025-06-03 ENCOUNTER — OFFICE VISIT (OUTPATIENT)
Dept: CARDIOLOGY CLINIC | Age: 75
End: 2025-06-03
Payer: MEDICARE

## 2025-06-03 VITALS
OXYGEN SATURATION: 99 % | SYSTOLIC BLOOD PRESSURE: 126 MMHG | WEIGHT: 144.18 LBS | DIASTOLIC BLOOD PRESSURE: 64 MMHG | HEART RATE: 90 BPM | HEIGHT: 72 IN | BODY MASS INDEX: 19.53 KG/M2

## 2025-06-03 DIAGNOSIS — I48.19 PERSISTENT ATRIAL FIBRILLATION (HCC): Primary | ICD-10-CM

## 2025-06-03 LAB
EKG ATRIAL RATE: 90 BPM
EKG DIAGNOSIS: NORMAL
EKG P AXIS: 92 DEGREES
EKG P-R INTERVAL: 176 MS
EKG Q-T INTERVAL: 368 MS
EKG QRS DURATION: 78 MS
EKG QTC CALCULATION (BAZETT): 450 MS
EKG R AXIS: 58 DEGREES
EKG T AXIS: 72 DEGREES
EKG VENTRICULAR RATE: 90 BPM

## 2025-06-03 PROCEDURE — 3078F DIAST BP <80 MM HG: CPT | Performed by: INTERNAL MEDICINE

## 2025-06-03 PROCEDURE — 99214 OFFICE O/P EST MOD 30 MIN: CPT | Performed by: INTERNAL MEDICINE

## 2025-06-03 PROCEDURE — G8427 DOCREV CUR MEDS BY ELIG CLIN: HCPCS | Performed by: INTERNAL MEDICINE

## 2025-06-03 PROCEDURE — 1036F TOBACCO NON-USER: CPT | Performed by: INTERNAL MEDICINE

## 2025-06-03 PROCEDURE — 1159F MED LIST DOCD IN RCRD: CPT | Performed by: INTERNAL MEDICINE

## 2025-06-03 PROCEDURE — 1123F ACP DISCUSS/DSCN MKR DOCD: CPT | Performed by: INTERNAL MEDICINE

## 2025-06-03 PROCEDURE — G8420 CALC BMI NORM PARAMETERS: HCPCS | Performed by: INTERNAL MEDICINE

## 2025-06-03 PROCEDURE — 3074F SYST BP LT 130 MM HG: CPT | Performed by: INTERNAL MEDICINE

## 2025-06-03 PROCEDURE — 1160F RVW MEDS BY RX/DR IN RCRD: CPT | Performed by: INTERNAL MEDICINE

## 2025-06-03 PROCEDURE — 3017F COLORECTAL CA SCREEN DOC REV: CPT | Performed by: INTERNAL MEDICINE

## 2025-06-03 RX ORDER — POTASSIUM CHLORIDE 1500 MG/1
20 TABLET, EXTENDED RELEASE ORAL DAILY
COMMUNITY
Start: 2025-05-15

## 2025-06-03 NOTE — PROGRESS NOTES
[facility]   Electrophysiology Consult Note    Date: 6/3/25  Patient Name: Jamal Hutchison  YOB: 1950    Primary Care Physician: Main Velazco III, MD    CHIEF COMPLAINT:   Chief Complaint   Patient presents with    Follow-up    Atrial Fibrillation    Atrial Flutter     HISTORY OF PRESENT ILLNESS: Jamal Hutchison is a 74 y.o. male who presents for evaluation for atrial fibrillation. The patient has a past medical history of coronary artery disease, CVA, hyperlipidemia, hypertension, and atrial fibrillation. He was admitted on 05/25/2023 for a scheduled left carotid endarterectomy. He was found to be in AF with RVR. He was treated with Diltiazem IV. Patient wore a cardiac event monitor from 05/16/2023 to 06/11/2023 which demonstrated predominately AF (87% burden) with an average HR of 93 (). PAC burden 0.58%, PVC burden 1.32%.    On 08/22/2023, ECG demonstrated AF (95 BPM). He reported feeling constant fatigue. At the conclusion of visit, AF options were discussed; patient's diltiazem was increased to 240mg BID.   On 2/27/2024 patient underwent angioplasty and stenting of his distal left superficial femoral artery and popliteal angioplasty for severe claudication and PVD. He is wearing a Fitbit and heart rate is 106 currently which matches to his ECG today.  After reviewing his medications with him and his wife.  He has not been taking his diltiazem twice daily.  States that his PCP told him to only take it once a day due to some stomach discomfort.  His PCP also discontinued his Lipitor due to stomach discomfort.  Patient admits to drinking 7 beers per day.  I informed him and his wife that his stomach discomfort is likely due to alcohol.  Patient states he will try to decrease his alcohol to 2 beers per day.  We will start him on daily Protonix and Gas-X for gas pains.  We will restart his diltiazem to twice a day and also restart his Lipitor at 40 mg daily.  His wife will

## 2025-06-03 NOTE — PATIENT INSTRUCTIONS
RECOMMENDATIONS:  Discussed options regarding OAC:   -Discussed reducing eliquis to 2.5 mg twice daily. Informed patient and patient's wife that 2.5 mg dose twice daily will not fully protect patient from stroke. Recommend watchman procedure as previously discussed. Let us know how you would like to proceed.   2. Follow up with EP NP in 6 months.    Pfizer dose 1 and 2

## 2025-06-13 RX ORDER — DOFETILIDE 0.12 MG/1
125 CAPSULE ORAL EVERY 12 HOURS SCHEDULED
Qty: 60 CAPSULE | Refills: 5 | Status: SHIPPED | OUTPATIENT
Start: 2025-06-13

## 2025-06-13 NOTE — TELEPHONE ENCOUNTER
Last Office Visit: 6/3/2025 Provider: NHUNG  **Is provider OOT? No    Next Office Visit: N/A   Provider:   **If no OV, when does pt need to be seen? in 6 month(s)  **Has patient already had 30 day supply? No    Lab orders needed? no   Encounter provider correct? Yes If not, change provider  Script changes since last refill? no    LAST LABS:   BMP:  Lab Results   Component Value Date/Time     2025 04:37 AM    K 3.9 2025 04:37 AM     2025 04:37 AM    CO2 20 2025 04:37 AM    BUN 14 2025 04:37 AM    CREATININE 1.2 2025 04:37 AM    GLUCOSE 81 2025 04:37 AM    CALCIUM 7.9 2025 04:37 AM    LABGLOM 63 2025 04:37 AM    LABGLOM >60 2024 06:49 AM      EK25      **Care Everywhere? no     Pt to fu in 6 mos w/ EPNP

## 2025-06-19 ENCOUNTER — HOSPITAL ENCOUNTER (OUTPATIENT)
Dept: ULTRASOUND IMAGING | Age: 75
Discharge: HOME | End: 2025-06-19
Payer: MEDICARE

## 2025-06-19 DIAGNOSIS — K70.30 ALCOHOLIC CIRRHOSIS OF LIVER WITHOUT ASCITES (HCC): ICD-10-CM

## 2025-06-19 PROCEDURE — 76705 ECHO EXAM OF ABDOMEN: CPT

## 2025-07-14 ENCOUNTER — TRANSCRIBE ORDERS (OUTPATIENT)
Dept: ADMINISTRATIVE | Age: 75
End: 2025-07-14

## 2025-07-14 DIAGNOSIS — K70.30 ALCOHOLIC CIRRHOSIS, UNSPECIFIED WHETHER ASCITES PRESENT (HCC): Primary | ICD-10-CM

## 2025-07-15 ENCOUNTER — TELEPHONE (OUTPATIENT)
Dept: INTERVENTIONAL RADIOLOGY/VASCULAR | Age: 75
End: 2025-07-15

## 2025-07-15 NOTE — TELEPHONE ENCOUNTER
Called and spoke to Juana about upcoming procedure. Phone number used: [phone]  Procedure: para  Approving Radiologist:     Pt informed of the following:  Date of procedure: 7/16/25  Arrival time of procedure: 0900  Time of procedure: 0930

## 2025-07-16 ENCOUNTER — HOSPITAL ENCOUNTER (OUTPATIENT)
Dept: ULTRASOUND IMAGING | Age: 75
Discharge: HOME | End: 2025-07-16
Payer: MEDICARE

## 2025-07-16 VITALS — DIASTOLIC BLOOD PRESSURE: 77 MMHG | SYSTOLIC BLOOD PRESSURE: 145 MMHG

## 2025-07-16 DIAGNOSIS — K70.30 ALCOHOLIC CIRRHOSIS, UNSPECIFIED WHETHER ASCITES PRESENT (HCC): ICD-10-CM

## 2025-07-16 PROCEDURE — 49083 ABD PARACENTESIS W/IMAGING: CPT

## 2025-07-16 NOTE — PROCEDURES
PROCEDURE NOTE  Date: 7/16/2025   Name: Jamal Hutchison  YOB: 1950    Procedures  Pt arrived for image guided right Paracentesis. natan explained the procedure including the risk and benefits of the procedure. All questions answered. Pt verbalizes understanding of the procedure and states no more questions. Consent confirmed. Vital signs stable. Labs, allergies, medications, and code status reviewed. No contraindications noted. Time out completed prior to procedure.    Vital Signs  Vitals:    07/16/25 0945   BP: (!) 152/78    (vital signs in table format)      Allergies  Patient has no known allergies. (allergies)    Labs  Lab Results   Component Value Date    INR 2.68 (H) 06/23/2025    PROTIME 28.0 (H) 06/23/2025     Lab Results   Component Value Date    CREATININE 1.2 06/19/2025    BUN 17 06/19/2025     (L) 06/19/2025    K 4.2 06/19/2025    CL 97 (L) 06/19/2025    CO2 20 (L) 06/19/2025     Lab Results   Component Value Date    WBC 7.4 06/19/2025    HGB 10.1 (L) 06/19/2025    HCT 29.7 (L) 06/19/2025    MCV 82.9 06/19/2025     06/19/2025

## 2025-07-16 NOTE — PROCEDURES
PROCEDURE NOTE  Date: 7/16/2025   Name: Jamal Hutchison  YOB: 1950    Procedures  Image guided right Paracentesis completed.  5 liters of clear yellow colored withdrawn.  Pt tolerated procedure without any signs or symptoms of distress. Vital signs stable.     DISCHARGED:  HOME: Discharge instructions reviewed with patient. Verbalized understanding.     SPECIMEN SENT:  No    Vital Signs  Vitals:    07/16/25 1030   BP: (!) 145/77    (vital signs in table format)

## 2025-09-03 ENCOUNTER — TRANSCRIBE ORDERS (OUTPATIENT)
Dept: ADMINISTRATIVE | Age: 75
End: 2025-09-03

## 2025-09-03 DIAGNOSIS — K70.30 ALCOHOLIC CIRRHOSIS OF LIVER WITHOUT ASCITES (HCC): Primary | ICD-10-CM

## 2025-09-05 ENCOUNTER — HOSPITAL ENCOUNTER (OUTPATIENT)
Dept: ULTRASOUND IMAGING | Age: 75
Discharge: HOME | End: 2025-09-05
Payer: MEDICARE

## 2025-09-05 DIAGNOSIS — K70.30 ALCOHOLIC CIRRHOSIS OF LIVER WITHOUT ASCITES (HCC): ICD-10-CM

## 2025-09-05 LAB
ALBUMIN FLD-MCNC: 0.5 G/DL
APPEARANCE FLUID: CLEAR
BDY FLUID QUALITY: NORMAL
CELL COUNT FLUID TYPE: NORMAL
COLOR FLUID: YELLOW
LYMPHOCYTES NFR FLD: 18 %
MACROPHAGES # FLD: 26 %
MESOTHL CELL NFR FLD: 13 %
NEUTROPHIL, FLUID: 43 %
NUC CELL # FLD: 30 /CUMM
PROT FLD-MCNC: 0.8 G/DL
RBC FLUID: 63 /CUMM
SPECIMEN SOURCE FLD: NORMAL
TOTAL CELLS COUNTED FLD: 100

## 2025-09-05 PROCEDURE — 89051 BODY FLUID CELL COUNT: CPT

## 2025-09-05 PROCEDURE — 82042 OTHER SOURCE ALBUMIN QUAN EA: CPT

## 2025-09-05 PROCEDURE — 87070 CULTURE OTHR SPECIMN AEROBIC: CPT

## 2025-09-05 PROCEDURE — 87205 SMEAR GRAM STAIN: CPT

## 2025-09-05 PROCEDURE — 49083 ABD PARACENTESIS W/IMAGING: CPT

## 2025-09-05 PROCEDURE — 84157 ASSAY OF PROTEIN OTHER: CPT

## 2025-09-07 LAB
BACTERIA FLD AEROBE CULT: NORMAL
GRAM STN SPEC: NORMAL

## (undated) DEVICE — TUBING PMP FOR CARTO SYS SMARTABLATE

## (undated) DEVICE — TIDISHIELD ZERO GRAVITY COVERS CLEAR POLYETHYLENE STERILE FITS ZERO GRAVITY 20 PER CASE: Brand: TIDISHIELD

## (undated) DEVICE — CATHETER,URETHRAL,REDRUBBER,STERILE,20FR: Brand: MEDLINE

## (undated) DEVICE — CATHETER MAP D CRV 3-3-3-3-3 MM SPC GALAXY OCTARAY

## (undated) DEVICE — MAGNETIC INSTRUMENT PAD 10" X 16"; MEDIUM; DISPOSABLE: Brand: CARDINAL HEALTH

## (undated) DEVICE — PATCH REF EXT FOR CARTO 3 SYS (EA = 6 PACKS)

## (undated) DEVICE — SUTURE NONABSORBABLE MONOFILAMENT 7-0 BV-1 1X24 IN PROLENE 8702H

## (undated) DEVICE — Device: Brand: MEDEX

## (undated) DEVICE — DRAIN SURG FLAT W7MMXL20CM FULL PERF

## (undated) DEVICE — EP VASCULAR PACK: Brand: MEDLINE INDUSTRIES, INC.

## (undated) DEVICE — NEEDLE INTRO L98CM OD18GA 30DEG BRK-1 XS S STL TRANSSEPTAL

## (undated) DEVICE — CANNULA NSL AD TBNG L7FT PVC STR NONFLARED PRNG O2 DEL W STD

## (undated) DEVICE — SUTURE PERMA-HAND SZ 3-0 L18IN NONABSORBABLE BLK RB-1 L17MM C053D

## (undated) DEVICE — 3M™ TEGADERM™ TRANSPARENT FILM DRESSING FRAME STYLE, 1624W, 2-3/8 IN X 2-3/4 IN (6 CM X 7 CM), 100/CT 4CT/CASE: Brand: 3M™ TEGADERM™

## (undated) DEVICE — GOWN SIRUS NONREIN XL W/TWL: Brand: MEDLINE INDUSTRIES, INC.

## (undated) DEVICE — ENDOSCOPIC KIT 2 12 FT OP4 DE2 GWN SYR

## (undated) DEVICE — SYSTEM CLOSURE 6-12 FR VEN VASC VASCADE MVP

## (undated) DEVICE — FOGARTY - HYDRAGRIP SURGICAL - CLAMP INSERTS: Brand: FOGARTY SOFTJAW

## (undated) DEVICE — COVER US PRB W12XL244CM SURGICAL INTRAOPERATIVE PLAS TAPR L

## (undated) DEVICE — RESERVOIR,SUCTION,100CC,SILICONE: Brand: MEDLINE

## (undated) DEVICE — SUTURE NONABSORBABLE MONOFILAMENT 6-0 C-1 1X30 IN PROLENE 8706H

## (undated) DEVICE — BITE BLOCK ENDOSCP AD 60 FR W/ ADJ STRP PLAS GRN BLOX

## (undated) DEVICE — STANDARD HYPODERMIC NEEDLE,POLYPROPYLENE HUB: Brand: MONOJECT

## (undated) DEVICE — FORCEPS BX L240CM DIA2.4MM L NDL RAD JAW 4 133340

## (undated) DEVICE — SUTURE MCRYL + SZ 4-0 L18IN ABSRB UD L19MM PS-2 3/8 CIR MCP496G

## (undated) DEVICE — GLOVE SURG SZ 8 L11.77IN FNGR THK9.8MIL STRW LTX POLYMER

## (undated) DEVICE — ELECTRODE,ECG,STRESS,FOAM,3PK: Brand: MEDLINE

## (undated) DEVICE — ACUMEN IQ CUFF ADULT: Brand: ACUMEN IQ CUFF ADULT

## (undated) DEVICE — SOLUTION IV IRRIG POUR BRL 0.9% SODIUM CHL 2F7124

## (undated) DEVICE — PINNACLE INTRODUCER SHEATH: Brand: PINNACLE

## (undated) DEVICE — CATHETER EP 7FR L115CM 2-8-2MM SPC TIP 2MM 10 ELECTRD D CRV

## (undated) DEVICE — PERCLOSE™ PROSTYLE™ SUTURE-MEDIATED CLOSURE AND REPAIR SYSTEM: Brand: PERCLOSE™ PROSTYLE™

## (undated) DEVICE — CATHETER US 8FR L90CM GRN TIP OVERLAY FOR GE-VIVID I VIVID

## (undated) DEVICE — SHEATH GUID 11.5X8.5FR L71MM M CRV L22MM BIDIR STEER CARTO

## (undated) DEVICE — SURGIFOAM SPNG SZ 12-7

## (undated) DEVICE — Device

## (undated) DEVICE — GAUZE,SPONGE,2"X2",8PLY,STERILE,LF,2'S: Brand: MEDLINE

## (undated) DEVICE — SOLUTION IV HEPARIN SODIUM SODIUM CHL 0.9% 500 ML INJ VIAFLX

## (undated) DEVICE — GUIDEWIRE VASC L150CM DIA0.035IN FLX END L7CM J 3MM PTFE

## (undated) DEVICE — DISH PETRI ST LF

## (undated) DEVICE — SUTURE VCRL + SZ 3-0 L27IN ABSRB UD L26MM SH 1/2 CIR VCP416H

## (undated) DEVICE — STAPLER EXT SKIN 35 WIDE S STL STPL SQUEEZE HNDL VISISTAT

## (undated) DEVICE — PRESSURE MONITORING SET: Brand: TRUWAVE

## (undated) DEVICE — SUTURE VCRL + SZ 2-0 L27IN ABSRB CLR CT-1 1/2 CIR TAPERCUT VCP259H

## (undated) DEVICE — THE ESOPHAGEAL COOLING DEVICE IS A THERMAL REGULATING DEVICE, INTENDED TO CONNECT TO A CINCINNATI SUB-ZERO BLANKETROL II OR BLANKETROL III HYPER-HYPOTHERMIA SYSTEM TO CONTROL PATIENT TEMPERATURE, AND PROVIDE GASTRIC DECOMPRESSION AND SUCTIONING.: Brand: ESOPHAGEAL COOLING DEVICE

## (undated) DEVICE — CATHETER ABLAT 8FR L115CM 1-6-2MM SPC TIP 3.5MM DF CRV